# Patient Record
Sex: FEMALE | Race: WHITE | NOT HISPANIC OR LATINO | Employment: OTHER | URBAN - METROPOLITAN AREA
[De-identification: names, ages, dates, MRNs, and addresses within clinical notes are randomized per-mention and may not be internally consistent; named-entity substitution may affect disease eponyms.]

---

## 2017-01-11 ENCOUNTER — HOSPITAL ENCOUNTER (OUTPATIENT)
Dept: PAIN MEDICINE | Facility: HOSPITAL | Age: 57
Discharge: HOME | End: 2017-01-11
Attending: ANESTHESIOLOGY | Admitting: ANESTHESIOLOGY

## 2017-01-11 LAB
AMPHETAMINES UR QL SCN: NEGATIVE
BZE UR QL SCN: NEGATIVE
CREAT 24H UR-MCNC: ABNORMAL MG/DL
METHADONE UR QL SCN: NEGATIVE
OPIATE CONFIRMATION URINE: ABNORMAL
THC SERPLBLD CFM-MCNC: NEGATIVE NG/ML

## 2017-02-01 ENCOUNTER — OFFICE (AMBULATORY)
Dept: URBAN - METROPOLITAN AREA CLINIC 64 | Facility: CLINIC | Age: 57
End: 2017-02-01

## 2017-02-01 VITALS
DIASTOLIC BLOOD PRESSURE: 93 MMHG | HEART RATE: 79 BPM | HEIGHT: 66 IN | WEIGHT: 160 LBS | SYSTOLIC BLOOD PRESSURE: 110 MMHG

## 2017-02-01 DIAGNOSIS — R94.5 ABNORMAL RESULTS OF LIVER FUNCTION STUDIES: ICD-10-CM

## 2017-02-01 DIAGNOSIS — R93.2 ABNORMAL FINDINGS ON DIAGNOSTIC IMAGING OF LIVER AND BILIARY: ICD-10-CM

## 2017-02-01 LAB
ACTIN (SMOOTH MUSCLE) ANTIBODY: 11 UNITS (ref 0–19)
ALK PHOS ISOENZYME: BONE FRACTION: 29 % (ref 14–68)
ALK PHOS ISOENZYME: INTESTINAL FRAC.: 5 % (ref 0–18)
ALK PHOS ISOENZYME: LIVER FRACTION: 67 % (ref 18–85)
ALPHA-1-ANTITRYPSIN PHENOTYP: ALPHA-1-ANTITRYPSIN, SERUM: 156 MG/DL (ref 90–200)
ALPHA-1-ANTITRYPSIN PHENOTYP: PHENOTYPE (PI): (no result)
ANTINUCLEAR ANTIBODIES, IFA: NEGATIVE
CERULOPLASMIN: 44 MG/DL — HIGH (ref 19–39)
FERRITIN, SERUM: 79 NG/ML (ref 15–150)
GGT: 68 IU/L — HIGH (ref 0–60)
IMMUNOGLOBULINS A/G/M, QN, SER: IMMUNOGLOBULIN A, QN, SERUM: 235 MG/DL (ref 87–352)
IMMUNOGLOBULINS A/G/M, QN, SER: IMMUNOGLOBULIN G, QN, SERUM: 869 MG/DL (ref 700–1600)
IMMUNOGLOBULINS A/G/M, QN, SER: IMMUNOGLOBULIN M, QN, SERUM: 246 MG/DL — HIGH (ref 26–217)
IRON AND TIBC: IRON BIND.CAP.(TIBC): 248 UG/DL — LOW (ref 250–450)
IRON AND TIBC: IRON SATURATION: 17 % (ref 15–55)
IRON AND TIBC: IRON, SERUM: 41 UG/DL (ref 27–159)
IRON AND TIBC: UIBC: 207 UG/DL (ref 131–425)
Lab: 174 IU/L — HIGH (ref 39–117)
MITOCHONDRIAL (M2) ANTIBODY: <20 UNITS

## 2017-02-01 PROCEDURE — 99213 OFFICE O/P EST LOW 20 MIN: CPT | Performed by: INTERNAL MEDICINE

## 2017-03-08 ENCOUNTER — HOSPITAL ENCOUNTER (OUTPATIENT)
Dept: PAIN MEDICINE | Facility: HOSPITAL | Age: 57
Discharge: HOME | End: 2017-03-08
Attending: ANESTHESIOLOGY | Admitting: ANESTHESIOLOGY

## 2017-03-08 LAB
AMPHETAMINES UR QL SCN: NEGATIVE
BZE UR QL SCN: NEGATIVE
CREAT 24H UR-MCNC: NORMAL MG/DL
METHADONE UR QL SCN: NEGATIVE
OPIATE CONFIRMATION URINE: NORMAL
THC SERPLBLD CFM-MCNC: NEGATIVE NG/ML

## 2017-03-13 ENCOUNTER — OFFICE (AMBULATORY)
Dept: URBAN - METROPOLITAN AREA CLINIC 64 | Facility: CLINIC | Age: 57
End: 2017-03-13

## 2017-03-13 VITALS
HEIGHT: 66 IN | HEART RATE: 86 BPM | DIASTOLIC BLOOD PRESSURE: 81 MMHG | SYSTOLIC BLOOD PRESSURE: 125 MMHG | WEIGHT: 161 LBS

## 2017-03-13 DIAGNOSIS — R94.5 ABNORMAL RESULTS OF LIVER FUNCTION STUDIES: ICD-10-CM

## 2017-03-13 DIAGNOSIS — R11.2 NAUSEA WITH VOMITING, UNSPECIFIED: ICD-10-CM

## 2017-03-13 DIAGNOSIS — R10.11 RIGHT UPPER QUADRANT PAIN: ICD-10-CM

## 2017-03-13 DIAGNOSIS — R93.2 ABNORMAL FINDINGS ON DIAGNOSTIC IMAGING OF LIVER AND BILIARY: ICD-10-CM

## 2017-03-13 PROCEDURE — 99214 OFFICE O/P EST MOD 30 MIN: CPT | Performed by: INTERNAL MEDICINE

## 2017-03-23 ENCOUNTER — HOSPITAL ENCOUNTER (OUTPATIENT)
Dept: OTHER | Facility: HOSPITAL | Age: 57
Setting detail: SPECIMEN
Discharge: HOME | End: 2017-03-23
Attending: FAMILY MEDICINE | Admitting: FAMILY MEDICINE

## 2017-05-08 ENCOUNTER — HOSPITAL ENCOUNTER (OUTPATIENT)
Dept: PAIN MEDICINE | Facility: HOSPITAL | Age: 57
Discharge: HOME | End: 2017-05-08
Attending: ANESTHESIOLOGY | Admitting: ANESTHESIOLOGY

## 2017-06-06 ENCOUNTER — HOSPITAL ENCOUNTER (OUTPATIENT)
Dept: PAIN MEDICINE | Facility: HOSPITAL | Age: 57
Discharge: HOME | End: 2017-06-06
Attending: ANESTHESIOLOGY | Admitting: ANESTHESIOLOGY

## 2017-07-05 ENCOUNTER — HOSPITAL ENCOUNTER (OUTPATIENT)
Dept: PAIN MEDICINE | Facility: HOSPITAL | Age: 57
Discharge: HOME | End: 2017-07-05
Attending: ANESTHESIOLOGY | Admitting: ANESTHESIOLOGY

## 2017-09-05 ENCOUNTER — HOSPITAL ENCOUNTER (OUTPATIENT)
Dept: PAIN MEDICINE | Facility: HOSPITAL | Age: 57
Discharge: HOME | End: 2017-09-05
Attending: ANESTHESIOLOGY | Admitting: ANESTHESIOLOGY

## 2017-11-13 ENCOUNTER — HOSPITAL ENCOUNTER (OUTPATIENT)
Dept: PAIN MEDICINE | Facility: HOSPITAL | Age: 57
Discharge: HOME | End: 2017-11-13
Attending: ANESTHESIOLOGY | Admitting: ANESTHESIOLOGY

## 2017-11-13 LAB
AMPHETAMINES UR QL SCN: ABNORMAL
BZE UR QL SCN: NEGATIVE
CREAT 24H UR-MCNC: ABNORMAL MG/DL
METHADONE UR QL SCN: NEGATIVE
OPIATE CONFIRMATION URINE: ABNORMAL
THC SERPLBLD CFM-MCNC: ABNORMAL NG/ML

## 2018-05-21 ENCOUNTER — HOSPITAL ENCOUNTER (OUTPATIENT)
Dept: OTHER | Facility: HOSPITAL | Age: 58
Discharge: HOME | End: 2018-05-21
Attending: SPECIALIST | Admitting: SPECIALIST

## 2018-05-21 LAB
CRP SERPL-MCNC: 2.29 MG/DL (ref 0–0.7)
ERYTHROCYTE [SEDIMENTATION RATE] IN BLOOD BY WESTERGREN METHOD: 48 MM/HR (ref 0–30)

## 2018-05-23 LAB
ANA SER QL IA: NORMAL
CHROMATIN AB SERPL-ACNC: <20 [IU]/ML (ref 0–20)

## 2018-10-04 ENCOUNTER — HOSPITAL ENCOUNTER (OUTPATIENT)
Dept: MRI IMAGING | Facility: HOSPITAL | Age: 58
Discharge: HOME | End: 2018-10-04
Attending: SPECIALIST | Admitting: SPECIALIST

## 2020-12-01 ENCOUNTER — OFFICE VISIT (OUTPATIENT)
Dept: FAMILY MEDICINE CLINIC | Facility: CLINIC | Age: 60
End: 2020-12-01

## 2020-12-01 VITALS
RESPIRATION RATE: 18 BRPM | DIASTOLIC BLOOD PRESSURE: 74 MMHG | HEART RATE: 73 BPM | WEIGHT: 142 LBS | TEMPERATURE: 96.9 F | OXYGEN SATURATION: 98 % | SYSTOLIC BLOOD PRESSURE: 182 MMHG | HEIGHT: 65 IN | BODY MASS INDEX: 23.66 KG/M2

## 2020-12-01 DIAGNOSIS — Z12.31 ENCOUNTER FOR SCREENING MAMMOGRAM FOR MALIGNANT NEOPLASM OF BREAST: ICD-10-CM

## 2020-12-01 DIAGNOSIS — Z78.0 POSTMENOPAUSAL: ICD-10-CM

## 2020-12-01 DIAGNOSIS — F32.1 CURRENT MODERATE EPISODE OF MAJOR DEPRESSIVE DISORDER, UNSPECIFIED WHETHER RECURRENT (HCC): ICD-10-CM

## 2020-12-01 DIAGNOSIS — G62.89 OTHER POLYNEUROPATHY: ICD-10-CM

## 2020-12-01 DIAGNOSIS — F51.04 CHRONIC INSOMNIA: ICD-10-CM

## 2020-12-01 DIAGNOSIS — R03.0 ELEVATED BLOOD PRESSURE READING WITHOUT DIAGNOSIS OF HYPERTENSION: Primary | ICD-10-CM

## 2020-12-01 DIAGNOSIS — E78.2 MIXED HYPERLIPIDEMIA: ICD-10-CM

## 2020-12-01 DIAGNOSIS — E03.9 ACQUIRED HYPOTHYROIDISM: ICD-10-CM

## 2020-12-01 DIAGNOSIS — N89.8 VAGINAL PRURITUS: ICD-10-CM

## 2020-12-01 DIAGNOSIS — Z23 NEED FOR INFLUENZA VACCINATION: ICD-10-CM

## 2020-12-01 PROBLEM — G89.29 CHRONIC PAIN: Status: ACTIVE | Noted: 2017-11-13

## 2020-12-01 PROBLEM — M51.34 DEGENERATION OF INTERVERTEBRAL DISC OF THORACIC REGION: Status: ACTIVE | Noted: 2017-03-08

## 2020-12-01 PROBLEM — E78.5 HYPERLIPIDEMIA: Status: ACTIVE | Noted: 2020-12-01

## 2020-12-01 PROBLEM — M19.90 ARTHRITIS: Status: ACTIVE | Noted: 2020-12-01

## 2020-12-01 PROBLEM — I10 HYPERTENSION: Status: ACTIVE | Noted: 2020-12-01

## 2020-12-01 PROBLEM — F32.A DEPRESSION: Status: ACTIVE | Noted: 2020-12-01

## 2020-12-01 PROBLEM — Z79.899 OTHER LONG TERM (CURRENT) DRUG THERAPY: Status: ACTIVE | Noted: 2017-01-11

## 2020-12-01 PROCEDURE — 90686 IIV4 VACC NO PRSV 0.5 ML IM: CPT | Performed by: FAMILY MEDICINE

## 2020-12-01 PROCEDURE — 99204 OFFICE O/P NEW MOD 45 MIN: CPT | Performed by: FAMILY MEDICINE

## 2020-12-01 PROCEDURE — G0008 ADMIN INFLUENZA VIRUS VAC: HCPCS | Performed by: FAMILY MEDICINE

## 2020-12-01 RX ORDER — ATORVASTATIN CALCIUM 20 MG/1
TABLET, FILM COATED ORAL
COMMUNITY
End: 2020-12-01

## 2020-12-01 RX ORDER — SOLIFENACIN SUCCINATE 5 MG/1
TABLET, FILM COATED ORAL
COMMUNITY
Start: 2020-10-17 | End: 2020-12-01

## 2020-12-01 RX ORDER — ONDANSETRON 4 MG/1
TABLET, FILM COATED ORAL
COMMUNITY
Start: 2020-10-12 | End: 2020-12-01

## 2020-12-01 RX ORDER — OXYCODONE HYDROCHLORIDE 10 MG/1
TABLET ORAL
COMMUNITY
Start: 2020-08-28 | End: 2020-12-01

## 2020-12-01 RX ORDER — LEVOTHYROXINE SODIUM 0.12 MG/1
125 TABLET ORAL DAILY
Qty: 90 TABLET | Refills: 0 | Status: SHIPPED | OUTPATIENT
Start: 2020-12-01

## 2020-12-01 RX ORDER — VENLAFAXINE HYDROCHLORIDE 37.5 MG/1
TABLET, EXTENDED RELEASE ORAL
COMMUNITY
End: 2020-12-01

## 2020-12-01 RX ORDER — LISINOPRIL 20 MG/1
TABLET ORAL
COMMUNITY
Start: 2013-10-02 | End: 2020-12-01

## 2020-12-01 RX ORDER — FAMOTIDINE 40 MG/1
40 TABLET, FILM COATED ORAL DAILY
Qty: 90 TABLET | Refills: 0 | Status: SHIPPED | OUTPATIENT
Start: 2020-12-01 | End: 2021-03-02

## 2020-12-01 RX ORDER — LEVOTHYROXINE SODIUM 0.1 MG/1
TABLET ORAL
COMMUNITY
End: 2020-12-01

## 2020-12-01 RX ORDER — PREGABALIN 75 MG/1
75 CAPSULE ORAL 3 TIMES DAILY
Qty: 90 CAPSULE | Refills: 0 | Status: SHIPPED | OUTPATIENT
Start: 2020-12-01 | End: 2021-01-26

## 2020-12-01 RX ORDER — CYCLOBENZAPRINE HCL 10 MG
TABLET ORAL
COMMUNITY
Start: 2017-01-11 | End: 2020-12-01

## 2020-12-01 RX ORDER — LEVOTHYROXINE SODIUM 0.12 MG/1
TABLET ORAL
COMMUNITY
Start: 2020-10-17 | End: 2020-12-01 | Stop reason: SDUPTHER

## 2020-12-01 RX ORDER — FLUOXETINE HYDROCHLORIDE 20 MG/1
20 CAPSULE ORAL DAILY
Qty: 30 CAPSULE | Refills: 1 | Status: SHIPPED | OUTPATIENT
Start: 2020-12-01 | End: 2021-03-02

## 2020-12-01 RX ORDER — BUSPIRONE HYDROCHLORIDE 10 MG/1
TABLET ORAL
COMMUNITY
Start: 2020-10-17 | End: 2020-12-01

## 2020-12-01 RX ORDER — ATORVASTATIN CALCIUM 80 MG/1
TABLET, FILM COATED ORAL
COMMUNITY
Start: 2020-10-17

## 2020-12-01 RX ORDER — ZOLPIDEM TARTRATE 10 MG/1
TABLET ORAL
COMMUNITY
Start: 2020-09-29 | End: 2020-12-01 | Stop reason: SDUPTHER

## 2020-12-01 RX ORDER — FLUCONAZOLE 150 MG/1
TABLET ORAL
Qty: 2 TABLET | Refills: 0 | Status: SHIPPED | OUTPATIENT
Start: 2020-12-01

## 2020-12-01 RX ORDER — FLUOXETINE HYDROCHLORIDE 20 MG/1
CAPSULE ORAL
COMMUNITY
Start: 2020-09-22 | End: 2020-12-01 | Stop reason: SDUPTHER

## 2020-12-01 RX ORDER — GABAPENTIN 800 MG/1
TABLET ORAL
COMMUNITY
Start: 2017-03-08

## 2020-12-01 RX ORDER — OXYCODONE HYDROCHLORIDE 10 MG/1
TABLET ORAL
COMMUNITY
Start: 2016-12-01 | End: 2020-12-01

## 2020-12-01 RX ORDER — RANITIDINE 150 MG/1
TABLET ORAL
COMMUNITY
End: 2020-12-01

## 2020-12-01 RX ORDER — BACLOFEN 10 MG/1
TABLET ORAL
COMMUNITY
Start: 2020-10-12 | End: 2020-12-01

## 2020-12-01 RX ORDER — LORATADINE 10 MG/1
TABLET ORAL
COMMUNITY
End: 2020-12-01

## 2020-12-01 RX ORDER — ZOLPIDEM TARTRATE 10 MG/1
10 TABLET ORAL NIGHTLY PRN
Qty: 30 TABLET | Refills: 0 | Status: SHIPPED | OUTPATIENT
Start: 2020-12-01 | End: 2021-01-26

## 2020-12-01 RX ORDER — MELOXICAM 7.5 MG/1
TABLET ORAL
COMMUNITY
Start: 2020-09-28 | End: 2020-12-01

## 2020-12-01 NOTE — PROGRESS NOTES
Gunner Garcia is a 60 y.o. female.     Chief Complaint   Patient presents with   • Establish Care   • Depression     used to take prozac   • Vaginitis     itchy - no UA symptoms   • Insomnia     used to take ambien   • Peripheral Neuropathy     arthritis/ mult back surgeries for scoliosis - on pain pump         Current Outpatient Medications:   •  atorvastatin (LIPITOR) 80 MG tablet, , Disp: , Rfl:   •  levothyroxine (SYNTHROID, LEVOTHROID) 125 MCG tablet, Take 1 tablet by mouth Daily., Disp: 90 tablet, Rfl: 0  •  zolpidem (AMBIEN) 10 MG tablet, Take 1 tablet by mouth At Night As Needed for Sleep., Disp: 30 tablet, Rfl: 0  •  famotidine (PEPCID) 40 MG tablet, Take 1 tablet by mouth Daily., Disp: 90 tablet, Rfl: 0  •  fluconazole (Diflucan) 150 MG tablet, 1 po today and may repeat x1 in 4 days if still having symptoms, Disp: 2 tablet, Rfl: 0  •  FLUoxetine (PROzac) 20 MG capsule, Take 1 capsule by mouth Daily., Disp: 30 capsule, Rfl: 1  •  gabapentin (NEURONTIN) 800 MG tablet, GABAPENTIN 800 MG TABS, Disp: , Rfl:   •  pregabalin (Lyrica) 75 MG capsule, Take 1 capsule by mouth 3 (Three) Times a Day., Disp: 90 capsule, Rfl: 0    Past Medical History:   Diagnosis Date   • Arthritis    • Colon perforation 2012   • Depression    • GERD    • Hypothyroidism    • MAMMO    • Neuropathy     Hands/ Feet   • PAP    • Scoliosis 2010   • Stomach ulcer 2012       Past Surgical History:   Procedure Laterality Date   • BACK SURGERY  [id number]    6 surgeries   • COLON SURGERY      Colon Resection   • COLONOSCOPY         Family History   Problem Relation Age of Onset   • Arthritis Mother    • Cancer Mother 58        Colon Cancer   • Osteoporosis Mother    • Hypertension Mother    • Stroke Mother    • Hyperlipidemia Mother    • Thyroid disease Mother    • Liver disease Father    • Alcohol abuse Father    • Thyroid disease Sister    • Cancer Maternal Aunt         lung       Social History     Socioeconomic History   •  Marital status:      Spouse name: Not on file   • Number of children: Not on file   • Years of education: Not on file   • Highest education level: Not on file   Tobacco Use   • Smoking status: Former Smoker     Packs/day: 0.50     Years: 25.00     Pack years: 12.50     Types: Cigarettes     Quit date: 2010     Years since quitting: 10.9   • Smokeless tobacco: Never Used   Substance and Sexual Activity   • Alcohol use: Never     Frequency: Never   • Drug use: Yes     Comment: Marijuana ---- occasionally   • Sexual activity: Not Currently       60 y/o C female here to est care w/ hx/o Depression/ Insomnia/ Hypothyroid/ Peripheral Neuropathy and now w/ some vaginal pruitis    Home BP = 120'/80's w/ her wrist cuff but it hasn't been checked for accuracy----[facility] used to see her at her home and their BP's were about the same as hers    Pt states she used to take Prozac and ambien in past for above and felt she did well w/ them but they were changed and she never did as good w/ the other meds    Pt states she has some vaginal pruitis but no real d/c or noteable rash       The following portions of the patient's history were reviewed and updated as appropriate: allergies, current medications, past family history, past medical history, past social history, past surgical history and problem list.    Review of Systems   Constitutional: Positive for appetite change and fatigue. Negative for activity change, unexpected weight gain and unexpected weight loss.   Respiratory: Negative for cough, shortness of breath and wheezing.    Cardiovascular: Negative for chest pain, palpitations and leg swelling.   Gastrointestinal: Positive for GERD. Negative for blood in stool, nausea and vomiting.   Genitourinary: Negative for decreased urine volume, difficulty urinating, dysuria, hematuria, urgency, urinary incontinence, vaginal discharge and vaginal pain.   Skin: Negative for rash.   Neurological: Positive for numbness. Negative  for memory problem.   Psychiatric/Behavioral: Positive for sleep disturbance and depressed mood. Negative for agitation and suicidal ideas.       Vitals:    12/01/20 1023   BP: (!) 182/74   Pulse: 73   Resp: 18   Temp: 96.9 °F (36.1 °C)   SpO2: 98%       Objective   Physical Exam  Vitals signs and nursing note reviewed.   Constitutional:       General: She is not in acute distress.     Appearance: Normal appearance. She is well-developed and normal weight. She is not ill-appearing or toxic-appearing.   HENT:      Head: Normocephalic and atraumatic.   Neck:      Musculoskeletal: Normal range of motion and neck supple.   Cardiovascular:      Rate and Rhythm: Normal rate and regular rhythm.      Heart sounds: Normal heart sounds. No murmur.   Pulmonary:      Effort: Pulmonary effort is normal. No respiratory distress.      Breath sounds: Normal breath sounds. No stridor. No wheezing, rhonchi or rales.   Musculoskeletal:      Right lower leg: No edema.      Left lower leg: Edema present.   Skin:     General: Skin is warm and dry.      Findings: No rash.   Neurological:      Mental Status: She is alert and oriented to person, place, and time.      Cranial Nerves: No cranial nerve deficit.   Psychiatric:         Attention and Perception: Attention and perception normal.         Mood and Affect: Mood and affect normal.         Speech: Speech normal.         Behavior: Behavior normal. Behavior is cooperative.         Thought Content: Thought content normal.         Cognition and Memory: Cognition and memory normal.         Judgment: Judgment normal.           Assessment/Plan   Diagnoses and all orders for this visit:    1. Elevated blood pressure reading without diagnosis of hypertension (Primary)    2. Current moderate episode of major depressive disorder, unspecified whether recurrent (CMS/HCC)    3. Chronic insomnia  -     zolpidem (AMBIEN) 10 MG tablet; Take 1 tablet by mouth At Night As Needed for Sleep.  Dispense: 30  tablet; Refill: 0    4. Other polyneuropathy  -     pregabalin (Lyrica) 75 MG capsule; Take 1 capsule by mouth 3 (Three) Times a Day.  Dispense: 90 capsule; Refill: 0    5. Vaginal pruritus    6. Acquired hypothyroidism    7. Mixed hyperlipidemia  -     Lipid Panel; Future  -     Comprehensive Metabolic Panel; Future    8. Postmenopausal  -     DEXA Bone Density Axial; Future    9. Encounter for screening mammogram for malignant neoplasm of breast  -     Cancel: Mammo Screening Digital Tomosynthesis Bilateral With CAD; Future  -     Mammo Screening Digital Tomosynthesis Bilateral With CAD; Future    10. Need for influenza vaccination  -     Fluarix/Fluzone/Afluria Quad>6 Months    Other orders  -     FLUoxetine (PROzac) 20 MG capsule; Take 1 capsule by mouth Daily.  Dispense: 30 capsule; Refill: 1  -     levothyroxine (SYNTHROID, LEVOTHROID) 125 MCG tablet; Take 1 tablet by mouth Daily.  Dispense: 90 tablet; Refill: 0  -     fluconazole (Diflucan) 150 MG tablet; 1 po today and may repeat x1 in 4 days if still having symptoms  Dispense: 2 tablet; Refill: 0  -     famotidine (PEPCID) 40 MG tablet; Take 1 tablet by mouth Daily.  Dispense: 90 tablet; Refill: 0      Mammo/ Dexa  Flu shot  Restart Prozac and Ambien/ Synthroid  Trial Diflucan

## 2020-12-10 PROBLEM — M96.0 PSEUDARTHROSIS FOLLOWING SPINAL FUSION: Status: ACTIVE | Noted: 2017-01-06

## 2020-12-10 PROBLEM — T84.216A: Status: ACTIVE | Noted: 2020-08-10

## 2020-12-10 PROBLEM — F41.9 ANXIETY AND DEPRESSION: Status: ACTIVE | Noted: 2020-12-10

## 2020-12-10 PROBLEM — T84.226A: Status: ACTIVE | Noted: 2020-06-24

## 2020-12-10 PROBLEM — G62.9 NEUROPATHY: Status: ACTIVE | Noted: 2020-12-10

## 2020-12-10 PROBLEM — R33.8 ACUTE URINARY RETENTION: Status: ACTIVE | Noted: 2020-08-11

## 2020-12-10 PROBLEM — IMO0002 DDD (DEGENERATIVE DISC DISEASE): Status: ACTIVE | Noted: 2020-12-10

## 2020-12-10 PROBLEM — F32.A ANXIETY AND DEPRESSION: Status: ACTIVE | Noted: 2020-12-10

## 2020-12-10 PROBLEM — M81.0 OSTEOPOROSIS: Status: ACTIVE | Noted: 2020-12-10

## 2020-12-10 PROBLEM — IMO0002 PSEUDOARTHROSIS OF SPINE: Status: ACTIVE | Noted: 2017-02-09

## 2020-12-10 PROBLEM — Z72.0 TOBACCO USE: Status: ACTIVE | Noted: 2020-12-10

## 2020-12-11 ENCOUNTER — CLINICAL SUPPORT (OUTPATIENT)
Dept: FAMILY MEDICINE CLINIC | Facility: CLINIC | Age: 60
End: 2020-12-11

## 2020-12-11 DIAGNOSIS — E78.2 MIXED HYPERLIPIDEMIA: ICD-10-CM

## 2020-12-11 PROCEDURE — 80053 COMPREHEN METABOLIC PANEL: CPT | Performed by: FAMILY MEDICINE

## 2020-12-11 PROCEDURE — 80061 LIPID PANEL: CPT | Performed by: FAMILY MEDICINE

## 2020-12-11 PROCEDURE — 36415 COLL VENOUS BLD VENIPUNCTURE: CPT | Performed by: FAMILY MEDICINE

## 2020-12-12 LAB
ALBUMIN SERPL-MCNC: 3.7 G/DL (ref 3.5–5.2)
ALBUMIN/GLOB SERPL: 1.4 G/DL
ALP SERPL-CCNC: 133 U/L (ref 39–117)
ALT SERPL W P-5'-P-CCNC: 17 U/L (ref 1–33)
ANION GAP SERPL CALCULATED.3IONS-SCNC: 10.6 MMOL/L (ref 5–15)
AST SERPL-CCNC: 19 U/L (ref 1–32)
BILIRUB SERPL-MCNC: 0.4 MG/DL (ref 0–1.2)
BUN SERPL-MCNC: 5 MG/DL (ref 8–23)
BUN/CREAT SERPL: 7.5 (ref 7–25)
CALCIUM SPEC-SCNC: 9.2 MG/DL (ref 8.6–10.5)
CHLORIDE SERPL-SCNC: 103 MMOL/L (ref 98–107)
CHOLEST SERPL-MCNC: 130 MG/DL (ref 0–200)
CO2 SERPL-SCNC: 28.4 MMOL/L (ref 22–29)
CREAT SERPL-MCNC: 0.67 MG/DL (ref 0.57–1)
GFR SERPL CREATININE-BSD FRML MDRD: 89 ML/MIN/1.73
GLOBULIN UR ELPH-MCNC: 2.7 GM/DL
GLUCOSE SERPL-MCNC: 94 MG/DL (ref 65–99)
HDLC SERPL-MCNC: 39 MG/DL (ref 40–60)
LDLC SERPL CALC-MCNC: 65 MG/DL (ref 0–100)
LDLC/HDLC SERPL: 1.56 {RATIO}
POTASSIUM SERPL-SCNC: 4 MMOL/L (ref 3.5–5.2)
PROT SERPL-MCNC: 6.4 G/DL (ref 6–8.5)
SODIUM SERPL-SCNC: 142 MMOL/L (ref 136–145)
TRIGL SERPL-MCNC: 150 MG/DL (ref 0–150)
VLDLC SERPL-MCNC: 26 MG/DL (ref 5–40)

## 2020-12-14 ENCOUNTER — TELEPHONE (OUTPATIENT)
Dept: FAMILY MEDICINE CLINIC | Facility: CLINIC | Age: 60
End: 2020-12-14

## 2021-01-26 DIAGNOSIS — G62.89 OTHER POLYNEUROPATHY: ICD-10-CM

## 2021-01-26 DIAGNOSIS — F51.04 CHRONIC INSOMNIA: ICD-10-CM

## 2021-01-26 RX ORDER — PREGABALIN 75 MG/1
75 CAPSULE ORAL 3 TIMES DAILY
Qty: 90 CAPSULE | Refills: 1 | Status: SHIPPED | OUTPATIENT
Start: 2021-01-26

## 2021-01-26 RX ORDER — ZOLPIDEM TARTRATE 10 MG/1
10 TABLET ORAL NIGHTLY PRN
Qty: 30 TABLET | Refills: 1 | Status: SHIPPED | OUTPATIENT
Start: 2021-01-26

## 2021-03-01 RX ORDER — FLUOXETINE HYDROCHLORIDE 20 MG/1
20 CAPSULE ORAL DAILY
Qty: 30 CAPSULE | Refills: 0 | OUTPATIENT
Start: 2021-03-01

## 2021-03-01 RX ORDER — FAMOTIDINE 40 MG/1
40 TABLET, FILM COATED ORAL DAILY
Qty: 90 TABLET | Refills: 0 | OUTPATIENT
Start: 2021-03-01

## 2021-03-01 NOTE — TELEPHONE ENCOUNTER
Last visit: 12/1/20  Next visit: none  Last labs: 12/11/20    Rx requested:Fluoxetine,Famotidine   Pharmacy: [facility] in [place]

## 2021-03-02 RX ORDER — FAMOTIDINE 40 MG/1
40 TABLET, FILM COATED ORAL DAILY
Qty: 90 TABLET | Refills: 0 | Status: SHIPPED | OUTPATIENT
Start: 2021-03-02

## 2021-03-02 RX ORDER — FLUOXETINE HYDROCHLORIDE 20 MG/1
20 CAPSULE ORAL DAILY
Qty: 90 CAPSULE | Refills: 0 | Status: SHIPPED | OUTPATIENT
Start: 2021-03-02

## 2021-03-02 NOTE — TELEPHONE ENCOUNTER
Last visit: 12/1/20  Next visit: none  Last labs: 12/11/20    Rx requested:Famotidine,Prozac  Pharmacy: [facility] in [place]

## 2021-03-03 ENCOUNTER — TELEPHONE (OUTPATIENT)
Dept: FAMILY MEDICINE CLINIC | Facility: CLINIC | Age: 61
End: 2021-03-03

## 2021-03-03 NOTE — TELEPHONE ENCOUNTER
Pt cancelled her appt today for bp check.   Says she hurt her back and can't get out of bed.  Wants to know if you can give her her refills.  Rescheduled for next Wed

## 2023-01-31 ENCOUNTER — TELEPHONE (OUTPATIENT)
Dept: FAMILY MEDICINE CLINIC | Facility: CLINIC | Age: 63
End: 2023-01-31
Payer: MEDICARE

## 2023-01-31 NOTE — TELEPHONE ENCOUNTER
Left voice message stating patient is due for an annual wellness exam/physical. Instructed to call back to office to schedule.    Ok for Hub to read and schedule.

## 2023-02-24 ENCOUNTER — APPOINTMENT (OUTPATIENT)
Dept: CT IMAGING | Facility: HOSPITAL | Age: 63
End: 2023-02-24
Payer: MEDICARE

## 2023-02-24 ENCOUNTER — HOSPITAL ENCOUNTER (EMERGENCY)
Facility: HOSPITAL | Age: 63
Discharge: HOME | End: 2023-02-24
Attending: EMERGENCY MEDICINE | Admitting: EMERGENCY MEDICINE
Payer: MEDICARE

## 2023-02-24 ENCOUNTER — APPOINTMENT (OUTPATIENT)
Dept: GENERAL RADIOLOGY | Facility: HOSPITAL | Age: 63
End: 2023-02-24
Payer: MEDICARE

## 2023-02-24 VITALS
OXYGEN SATURATION: 96 % | DIASTOLIC BLOOD PRESSURE: 82 MMHG | RESPIRATION RATE: 20 BRPM | HEART RATE: 72 BPM | SYSTOLIC BLOOD PRESSURE: 162 MMHG | WEIGHT: 140 LBS | HEIGHT: 65 IN | TEMPERATURE: 98 F | BODY MASS INDEX: 23.32 KG/M2

## 2023-02-24 DIAGNOSIS — S42.022A DISPLACED FRACTURE OF SHAFT OF LEFT CLAVICLE, INITIAL ENCOUNTER FOR CLOSED FRACTURE: Primary | ICD-10-CM
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
## 2023-02-24 LAB
ALBUMIN SERPL-MCNC: 4.3 G/DL (ref 3.5–5.2)
ALBUMIN/GLOB SERPL: 1.5 G/DL
ALP SERPL-CCNC: 120 U/L (ref 39–117)
ALT SERPL W P-5'-P-CCNC: 40 U/L (ref 1–33)
ANION GAP SERPL CALCULATED.3IONS-SCNC: 12 MMOL/L (ref 5–15)
AST SERPL-CCNC: 33 U/L (ref 1–32)
BASOPHILS # BLD AUTO: 0.1 10*3/MM3 (ref 0–0.2)
BASOPHILS NFR BLD AUTO: 0.7 % (ref 0–1.5)
BILIRUB SERPL-MCNC: 0.3 MG/DL (ref 0–1.2)
BUN SERPL-MCNC: 20 MG/DL (ref 8–23)
BUN/CREAT SERPL: 21.3 (ref 7–25)
CALCIUM SPEC-SCNC: 9.5 MG/DL (ref 8.6–10.5)
CHLORIDE SERPL-SCNC: 103 MMOL/L (ref 98–107)
CO2 SERPL-SCNC: 22 MMOL/L (ref 22–29)
CREAT SERPL-MCNC: 0.94 MG/DL (ref 0.57–1)
DEPRECATED RDW RBC AUTO: 47.7 FL (ref 37–54)
EGFRCR SERPLBLD CKD-EPI 2021: 67.9 ML/MIN/1.73
EOSINOPHIL # BLD AUTO: 0.2 10*3/MM3 (ref 0–0.4)
EOSINOPHIL NFR BLD AUTO: 1 % (ref 0.3–6.2)
ERYTHROCYTE [DISTWIDTH] IN BLOOD BY AUTOMATED COUNT: 14.1 % (ref 12.3–15.4)
GLOBULIN UR ELPH-MCNC: 2.9 GM/DL
GLUCOSE SERPL-MCNC: 104 MG/DL (ref 65–99)
HCT VFR BLD AUTO: 39.7 % (ref 34–46.6)
HGB BLD-MCNC: 13.8 G/DL (ref 12–15.9)
LIPASE SERPL-CCNC: 40 U/L (ref 13–60)
LYMPHOCYTES # BLD AUTO: 1.4 10*3/MM3 (ref 0.7–3.1)
LYMPHOCYTES NFR BLD AUTO: 8.6 % (ref 19.6–45.3)
MCH RBC QN AUTO: 31.9 PG (ref 26.6–33)
MCHC RBC AUTO-ENTMCNC: 34.8 G/DL (ref 31.5–35.7)
MCV RBC AUTO: 91.6 FL (ref 79–97)
MONOCYTES # BLD AUTO: 0.8 10*3/MM3 (ref 0.1–0.9)
MONOCYTES NFR BLD AUTO: 4.9 % (ref 5–12)
NEUTROPHILS NFR BLD AUTO: 14.1 10*3/MM3 (ref 1.7–7)
NEUTROPHILS NFR BLD AUTO: 84.8 % (ref 42.7–76)
NRBC BLD AUTO-RTO: 0 /100 WBC (ref 0–0.2)
PLATELET # BLD AUTO: 267 10*3/MM3 (ref 140–450)
PMV BLD AUTO: 8.9 FL (ref 6–12)
POTASSIUM SERPL-SCNC: 4.1 MMOL/L (ref 3.5–5.2)
PROT SERPL-MCNC: 7.2 G/DL (ref 6–8.5)
RBC # BLD AUTO: 4.33 10*6/MM3 (ref 3.77–5.28)
SODIUM SERPL-SCNC: 137 MMOL/L (ref 136–145)
WBC NRBC COR # BLD: 16.6 10*3/MM3 (ref 3.4–10.8)

## 2023-02-24 PROCEDURE — 73030 X-RAY EXAM OF SHOULDER: CPT

## 2023-02-24 PROCEDURE — 72125 CT NECK SPINE W/O DYE: CPT

## 2023-02-24 PROCEDURE — 99284 EMERGENCY DEPT VISIT MOD MDM: CPT

## 2023-02-24 PROCEDURE — 85025 COMPLETE CBC W/AUTO DIFF WBC: CPT | Performed by: EMERGENCY MEDICINE

## 2023-02-24 PROCEDURE — 25010000002 DIAZEPAM PER 5 MG: Performed by: EMERGENCY MEDICINE

## 2023-02-24 PROCEDURE — 96374 THER/PROPH/DIAG INJ IV PUSH: CPT

## 2023-02-24 PROCEDURE — 80053 COMPREHEN METABOLIC PANEL: CPT | Performed by: EMERGENCY MEDICINE

## 2023-02-24 PROCEDURE — 25010000002 HYDROMORPHONE 1 MG/ML SOLUTION: Performed by: EMERGENCY MEDICINE

## 2023-02-24 PROCEDURE — 83690 ASSAY OF LIPASE: CPT | Performed by: EMERGENCY MEDICINE

## 2023-02-24 PROCEDURE — 96375 TX/PRO/DX INJ NEW DRUG ADDON: CPT

## 2023-02-24 PROCEDURE — 71250 CT THORAX DX C-: CPT

## 2023-02-24 PROCEDURE — 73060 X-RAY EXAM OF HUMERUS: CPT

## 2023-02-24 PROCEDURE — 70450 CT HEAD/BRAIN W/O DYE: CPT

## 2023-02-24 RX ORDER — SODIUM CHLORIDE 0.9 % (FLUSH) 0.9 %
10 SYRINGE (ML) INJECTION AS NEEDED
Status: DISCONTINUED | OUTPATIENT
Start: 2023-02-24 | End: 2023-02-24 | Stop reason: HOSPADM

## 2023-02-24 RX ORDER — DIAZEPAM 5 MG/ML
2.5 INJECTION, SOLUTION INTRAMUSCULAR; INTRAVENOUS ONCE
Status: COMPLETED | OUTPATIENT
Start: 2023-02-24 | End: 2023-02-24

## 2023-02-24 RX ORDER — HYDROCODONE BITARTRATE AND ACETAMINOPHEN 7.5; 325 MG/1; MG/1
1 TABLET ORAL ONCE
Status: COMPLETED | OUTPATIENT
Start: 2023-02-24 | End: 2023-02-24

## 2023-02-24 RX ADMIN — DIAZEPAM 2.5 MG: 10 INJECTION, SOLUTION INTRAMUSCULAR; INTRAVENOUS at 08:12

## 2023-02-24 RX ADMIN — HYDROMORPHONE HYDROCHLORIDE 0.5 MG: 1 INJECTION, SOLUTION INTRAMUSCULAR; INTRAVENOUS; SUBCUTANEOUS at 07:17

## 2023-02-24 RX ADMIN — HYDROCODONE BITARTRATE AND ACETAMINOPHEN 1 TABLET: 7.5; 325 TABLET ORAL at 06:02

## 2023-05-26 ENCOUNTER — HOSPITAL ENCOUNTER (OUTPATIENT)
Facility: HOSPITAL | Age: 63
Setting detail: OBSERVATION
Discharge: HOME | End: 2023-05-29
Attending: EMERGENCY MEDICINE | Admitting: INTERNAL MEDICINE
Payer: MEDICARE

## 2023-05-26 DIAGNOSIS — E83.42 HYPOMAGNESEMIA: ICD-10-CM

## 2023-05-26 DIAGNOSIS — R10.84 GENERALIZED ABDOMINAL PAIN: ICD-10-CM

## 2023-05-26 DIAGNOSIS — E87.6 HYPOKALEMIA: ICD-10-CM

## 2023-05-26 DIAGNOSIS — R19.7 DIARRHEA, UNSPECIFIED TYPE: ICD-10-CM

## 2023-05-26 DIAGNOSIS — K56.609 SMALL BOWEL OBSTRUCTION: ICD-10-CM

## 2023-05-26 DIAGNOSIS — R94.31 ABNORMAL EKG: ICD-10-CM

## 2023-05-26 DIAGNOSIS — M51.36 DEGENERATION OF INTERVERTEBRAL DISC OF LUMBAR REGION: ICD-10-CM

## 2023-05-26 DIAGNOSIS — R11.2 NAUSEA AND VOMITING, UNSPECIFIED VOMITING TYPE: Primary | ICD-10-CM

## 2023-05-26 LAB
ALBUMIN SERPL-MCNC: 4.7 G/DL (ref 3.5–5.2)
ALBUMIN/GLOB SERPL: 1.4 G/DL
ALP SERPL-CCNC: 130 U/L (ref 39–117)
ALT SERPL W P-5'-P-CCNC: 17 U/L (ref 1–33)
ANION GAP SERPL CALCULATED.3IONS-SCNC: 16 MMOL/L (ref 5–15)
AST SERPL-CCNC: 14 U/L (ref 1–32)
BILIRUB SERPL-MCNC: 0.8 MG/DL (ref 0–1.2)
BUN SERPL-MCNC: 11 MG/DL (ref 8–23)
BUN/CREAT SERPL: 11 (ref 7–25)
CALCIUM SPEC-SCNC: 9.9 MG/DL (ref 8.6–10.5)
CHLORIDE SERPL-SCNC: 100 MMOL/L (ref 98–107)
CO2 SERPL-SCNC: 17 MMOL/L (ref 22–29)
CREAT SERPL-MCNC: 1 MG/DL (ref 0.57–1)
DEPRECATED RDW RBC AUTO: 42.4 FL (ref 37–54)
EGFRCR SERPLBLD CKD-EPI 2021: 63 ML/MIN/1.73
EOSINOPHIL # BLD MANUAL: 0.08 10*3/MM3 (ref 0–0.4)
EOSINOPHIL NFR BLD MANUAL: 1 % (ref 0.3–6.2)
ERYTHROCYTE [DISTWIDTH] IN BLOOD BY AUTOMATED COUNT: 13.1 % (ref 12.3–15.4)
GLOBULIN UR ELPH-MCNC: 3.3 GM/DL
GLUCOSE SERPL-MCNC: 152 MG/DL (ref 65–99)
HCT VFR BLD AUTO: 44.2 % (ref 34–46.6)
HGB BLD-MCNC: 15.1 G/DL (ref 12–15.9)
LIPASE SERPL-CCNC: 17 U/L (ref 13–60)
LYMPHOCYTES # BLD MANUAL: 0.16 10*3/MM3 (ref 0.7–3.1)
LYMPHOCYTES NFR BLD MANUAL: 16 % (ref 5–12)
MAGNESIUM SERPL-MCNC: 1.4 MG/DL (ref 1.6–2.4)
MCH RBC QN AUTO: 31.3 PG (ref 26.6–33)
MCHC RBC AUTO-ENTMCNC: 34.2 G/DL (ref 31.5–35.7)
MCV RBC AUTO: 91.6 FL (ref 79–97)
MONOCYTES # BLD: 1.28 10*3/MM3 (ref 0.1–0.9)
NEUTROPHILS # BLD AUTO: 6.48 10*3/MM3 (ref 1.7–7)
NEUTROPHILS NFR BLD MANUAL: 52 % (ref 42.7–76)
NEUTS BAND NFR BLD MANUAL: 29 % (ref 0–5)
PLAT MORPH BLD: NORMAL
PLATELET # BLD AUTO: 245 10*3/MM3 (ref 140–450)
PMV BLD AUTO: 10.2 FL (ref 6–12)
POTASSIUM SERPL-SCNC: 2.7 MMOL/L (ref 3.5–5.2)
PROT SERPL-MCNC: 8 G/DL (ref 6–8.5)
RBC # BLD AUTO: 4.83 10*6/MM3 (ref 3.77–5.28)
RBC MORPH BLD: NORMAL
SCAN SLIDE: NORMAL
SODIUM SERPL-SCNC: 133 MMOL/L (ref 136–145)
TOXIC GRANULATION: ABNORMAL
VARIANT LYMPHS NFR BLD MANUAL: 2 % (ref 19.6–45.3)
WBC NRBC COR # BLD: 8 10*3/MM3 (ref 3.4–10.8)

## 2023-05-26 PROCEDURE — 85025 COMPLETE CBC W/AUTO DIFF WBC: CPT | Performed by: NURSE PRACTITIONER

## 2023-05-26 PROCEDURE — 99284 EMERGENCY DEPT VISIT MOD MDM: CPT

## 2023-05-26 PROCEDURE — 85007 BL SMEAR W/DIFF WBC COUNT: CPT | Performed by: NURSE PRACTITIONER

## 2023-05-26 PROCEDURE — 25010000002 ONDANSETRON PER 1 MG: Performed by: NURSE PRACTITIONER

## 2023-05-26 PROCEDURE — 83735 ASSAY OF MAGNESIUM: CPT | Performed by: NURSE PRACTITIONER

## 2023-05-26 PROCEDURE — 96375 TX/PRO/DX INJ NEW DRUG ADDON: CPT

## 2023-05-26 PROCEDURE — 25010000002 MAGNESIUM SULFATE 2 GM/50ML SOLUTION: Performed by: NURSE PRACTITIONER

## 2023-05-26 PROCEDURE — 93005 ELECTROCARDIOGRAM TRACING: CPT | Performed by: NURSE PRACTITIONER

## 2023-05-26 PROCEDURE — 83690 ASSAY OF LIPASE: CPT | Performed by: NURSE PRACTITIONER

## 2023-05-26 PROCEDURE — 25010000002 KETOROLAC TROMETHAMINE PER 15 MG: Performed by: NURSE PRACTITIONER

## 2023-05-26 PROCEDURE — 96365 THER/PROPH/DIAG IV INF INIT: CPT

## 2023-05-26 PROCEDURE — 87507 IADNA-DNA/RNA PROBE TQ 12-25: CPT | Performed by: NURSE PRACTITIONER

## 2023-05-26 PROCEDURE — 80053 COMPREHEN METABOLIC PANEL: CPT | Performed by: NURSE PRACTITIONER

## 2023-05-26 PROCEDURE — 96368 THER/DIAG CONCURRENT INF: CPT

## 2023-05-26 PROCEDURE — 0 POTASSIUM CHLORIDE 10 MEQ/100ML SOLUTION: Performed by: NURSE PRACTITIONER

## 2023-05-26 PROCEDURE — 87324 CLOSTRIDIUM AG IA: CPT | Performed by: INTERNAL MEDICINE

## 2023-05-26 PROCEDURE — 87449 NOS EACH ORGANISM AG IA: CPT | Performed by: INTERNAL MEDICINE

## 2023-05-26 RX ORDER — POTASSIUM CHLORIDE 7.45 MG/ML
10 INJECTION INTRAVENOUS
Status: DISPENSED | OUTPATIENT
Start: 2023-05-26 | End: 2023-05-27

## 2023-05-26 RX ORDER — KETOROLAC TROMETHAMINE 15 MG/ML
15 INJECTION, SOLUTION INTRAMUSCULAR; INTRAVENOUS ONCE
Status: COMPLETED | OUTPATIENT
Start: 2023-05-26 | End: 2023-05-26

## 2023-05-26 RX ORDER — MAGNESIUM SULFATE HEPTAHYDRATE 40 MG/ML
2 INJECTION, SOLUTION INTRAVENOUS ONCE
Status: COMPLETED | OUTPATIENT
Start: 2023-05-26 | End: 2023-05-27

## 2023-05-26 RX ORDER — ONDANSETRON 2 MG/ML
4 INJECTION INTRAMUSCULAR; INTRAVENOUS ONCE
Status: COMPLETED | OUTPATIENT
Start: 2023-05-26 | End: 2023-05-26

## 2023-05-26 RX ORDER — SODIUM CHLORIDE 0.9 % (FLUSH) 0.9 %
10 SYRINGE (ML) INJECTION AS NEEDED
Status: DISCONTINUED | OUTPATIENT
Start: 2023-05-26 | End: 2023-05-29 | Stop reason: HOSPADM

## 2023-05-26 RX ADMIN — KETOROLAC TROMETHAMINE 15 MG: 15 INJECTION, SOLUTION INTRAMUSCULAR; INTRAVENOUS at 23:26

## 2023-05-26 RX ADMIN — SODIUM CHLORIDE, POTASSIUM CHLORIDE, SODIUM LACTATE AND CALCIUM CHLORIDE 1000 ML: 600; 310; 30; 20 INJECTION, SOLUTION INTRAVENOUS at 22:16

## 2023-05-26 RX ADMIN — MAGNESIUM SULFATE HEPTAHYDRATE 2 G: 2 INJECTION, SOLUTION INTRAVENOUS at 23:27

## 2023-05-26 RX ADMIN — POTASSIUM CHLORIDE 10 MEQ: 7.46 INJECTION, SOLUTION INTRAVENOUS at 23:27

## 2023-05-26 RX ADMIN — ONDANSETRON 4 MG: 2 INJECTION INTRAMUSCULAR; INTRAVENOUS at 22:16

## 2023-05-26 NOTE — Clinical Note
Level of Care: Telemetry [5]   Admitting Physician: PEEWEE ROMO [[id number]]   Attending Physician: PEEWEE ROMO [[id number]]

## 2023-05-27 ENCOUNTER — APPOINTMENT (OUTPATIENT)
Dept: GENERAL RADIOLOGY | Facility: HOSPITAL | Age: 63
End: 2023-05-27
Payer: MEDICARE

## 2023-05-27 ENCOUNTER — APPOINTMENT (OUTPATIENT)
Dept: CT IMAGING | Facility: HOSPITAL | Age: 63
End: 2023-05-27
Payer: MEDICARE

## 2023-05-27 PROBLEM — R11.2 NAUSEA AND VOMITING, UNSPECIFIED VOMITING TYPE: Status: ACTIVE | Noted: 2023-05-27

## 2023-05-27 LAB
ADV 40+41 DNA STL QL NAA+NON-PROBE: NOT DETECTED
ASTRO TYP 1-8 RNA STL QL NAA+NON-PROBE: NOT DETECTED
C CAYETANENSIS DNA STL QL NAA+NON-PROBE: NOT DETECTED
C COLI+JEJ+UPSA DNA STL QL NAA+NON-PROBE: NOT DETECTED
C DIFF GDH + TOXINS A+B STL QL IA.RAPID: NEGATIVE
C DIFF GDH + TOXINS A+B STL QL IA.RAPID: NEGATIVE
CRYPTOSP DNA STL QL NAA+NON-PROBE: NOT DETECTED
E HISTOLYT DNA STL QL NAA+NON-PROBE: NOT DETECTED
EAEC PAA PLAS AGGR+AATA ST NAA+NON-PRB: NOT DETECTED
EC STX1+STX2 GENES STL QL NAA+NON-PROBE: NOT DETECTED
EPEC EAE GENE STL QL NAA+NON-PROBE: NOT DETECTED
ETEC LTA+ST1A+ST1B TOX ST NAA+NON-PROBE: NOT DETECTED
G LAMBLIA DNA STL QL NAA+NON-PROBE: NOT DETECTED
GEN 5 2HR TROPONIN T REFLEX: 13 NG/L
NOROVIRUS GI+II RNA STL QL NAA+NON-PROBE: NOT DETECTED
P SHIGELLOIDES DNA STL QL NAA+NON-PROBE: NOT DETECTED
POTASSIUM SERPL-SCNC: 2.8 MMOL/L (ref 3.5–5.2)
POTASSIUM SERPL-SCNC: 2.9 MMOL/L (ref 3.5–5.2)
QT INTERVAL: 438 MS
RVA RNA STL QL NAA+NON-PROBE: NOT DETECTED
S ENT+BONG DNA STL QL NAA+NON-PROBE: NOT DETECTED
SAPO I+II+IV+V RNA STL QL NAA+NON-PROBE: NOT DETECTED
SHIGELLA SP+EIEC IPAH ST NAA+NON-PROBE: NOT DETECTED
TROPONIN T DELTA: 5 NG/L
TROPONIN T SERPL HS-MCNC: 8 NG/L
V CHOL+PARA+VUL DNA STL QL NAA+NON-PROBE: NOT DETECTED
V CHOLERAE DNA STL QL NAA+NON-PROBE: NOT DETECTED
Y ENTEROCOL DNA STL QL NAA+NON-PROBE: NOT DETECTED

## 2023-05-27 PROCEDURE — 0 POTASSIUM CHLORIDE 10 MEQ/100ML SOLUTION: Performed by: NURSE PRACTITIONER

## 2023-05-27 PROCEDURE — 84484 ASSAY OF TROPONIN QUANT: CPT | Performed by: PHYSICIAN ASSISTANT

## 2023-05-27 PROCEDURE — 25010000002 HYDROMORPHONE 1 MG/ML SOLUTION: Performed by: NURSE PRACTITIONER

## 2023-05-27 PROCEDURE — 36415 COLL VENOUS BLD VENIPUNCTURE: CPT | Performed by: PHYSICIAN ASSISTANT

## 2023-05-27 PROCEDURE — G0378 HOSPITAL OBSERVATION PER HR: HCPCS

## 2023-05-27 PROCEDURE — 25010000002 PIPERACILLIN SOD-TAZOBACTAM PER 1 G: Performed by: NURSE PRACTITIONER

## 2023-05-27 PROCEDURE — 96366 THER/PROPH/DIAG IV INF ADDON: CPT

## 2023-05-27 PROCEDURE — 96375 TX/PRO/DX INJ NEW DRUG ADDON: CPT

## 2023-05-27 PROCEDURE — 84132 ASSAY OF SERUM POTASSIUM: CPT | Performed by: NURSE PRACTITIONER

## 2023-05-27 PROCEDURE — 25010000002 DROPERIDOL PER 5 MG: Performed by: NURSE PRACTITIONER

## 2023-05-27 PROCEDURE — 74177 CT ABD & PELVIS W/CONTRAST: CPT

## 2023-05-27 PROCEDURE — 99203 OFFICE O/P NEW LOW 30 MIN: CPT | Performed by: SURGERY

## 2023-05-27 PROCEDURE — 25510000001 IOPAMIDOL PER 1 ML: Performed by: EMERGENCY MEDICINE

## 2023-05-27 PROCEDURE — 74250 X-RAY XM SM INT 1CNTRST STD: CPT

## 2023-05-27 PROCEDURE — 25510000001 IOPAMIDOL PER 1 ML: Performed by: INTERNAL MEDICINE

## 2023-05-27 PROCEDURE — 96376 TX/PRO/DX INJ SAME DRUG ADON: CPT

## 2023-05-27 PROCEDURE — 84132 ASSAY OF SERUM POTASSIUM: CPT | Performed by: STUDENT IN AN ORGANIZED HEALTH CARE EDUCATION/TRAINING PROGRAM

## 2023-05-27 PROCEDURE — 96367 TX/PROPH/DG ADDL SEQ IV INF: CPT

## 2023-05-27 PROCEDURE — 25010000002 HYDROMORPHONE 1 MG/ML SOLUTION: Performed by: PHYSICIAN ASSISTANT

## 2023-05-27 PROCEDURE — 25010000002 DIPHENHYDRAMINE PER 50 MG: Performed by: NURSE PRACTITIONER

## 2023-05-27 RX ORDER — POTASSIUM CHLORIDE 20 MEQ/1
40 TABLET, EXTENDED RELEASE ORAL EVERY 4 HOURS
Status: COMPLETED | OUTPATIENT
Start: 2023-05-27 | End: 2023-05-27

## 2023-05-27 RX ORDER — SODIUM CHLORIDE 9 MG/ML
40 INJECTION, SOLUTION INTRAVENOUS AS NEEDED
Status: DISCONTINUED | OUTPATIENT
Start: 2023-05-27 | End: 2023-05-29 | Stop reason: HOSPADM

## 2023-05-27 RX ORDER — SODIUM CHLORIDE 0.9 % (FLUSH) 0.9 %
10 SYRINGE (ML) INJECTION EVERY 12 HOURS SCHEDULED
Status: DISCONTINUED | OUTPATIENT
Start: 2023-05-27 | End: 2023-05-29 | Stop reason: HOSPADM

## 2023-05-27 RX ORDER — SODIUM CHLORIDE 0.9 % (FLUSH) 0.9 %
10 SYRINGE (ML) INJECTION AS NEEDED
Status: DISCONTINUED | OUTPATIENT
Start: 2023-05-27 | End: 2023-05-29 | Stop reason: HOSPADM

## 2023-05-27 RX ORDER — DIPHENHYDRAMINE HYDROCHLORIDE 50 MG/ML
25 INJECTION INTRAMUSCULAR; INTRAVENOUS ONCE
Status: COMPLETED | OUTPATIENT
Start: 2023-05-27 | End: 2023-05-27

## 2023-05-27 RX ORDER — POLYETHYLENE GLYCOL 3350 17 G/17G
17 POWDER, FOR SOLUTION ORAL DAILY PRN
Status: DISCONTINUED | OUTPATIENT
Start: 2023-05-27 | End: 2023-05-27

## 2023-05-27 RX ORDER — OXYCODONE HYDROCHLORIDE 5 MG/1
10 TABLET ORAL EVERY 4 HOURS PRN
Status: DISCONTINUED | OUTPATIENT
Start: 2023-05-27 | End: 2023-05-29 | Stop reason: HOSPADM

## 2023-05-27 RX ORDER — PANTOPRAZOLE SODIUM 40 MG/10ML
40 INJECTION, POWDER, LYOPHILIZED, FOR SOLUTION INTRAVENOUS
Status: DISCONTINUED | OUTPATIENT
Start: 2023-05-27 | End: 2023-05-29 | Stop reason: HOSPADM

## 2023-05-27 RX ORDER — SODIUM CHLORIDE, SODIUM LACTATE, POTASSIUM CHLORIDE, CALCIUM CHLORIDE 600; 310; 30; 20 MG/100ML; MG/100ML; MG/100ML; MG/100ML
75 INJECTION, SOLUTION INTRAVENOUS CONTINUOUS
Status: DISCONTINUED | OUTPATIENT
Start: 2023-05-27 | End: 2023-05-29 | Stop reason: HOSPADM

## 2023-05-27 RX ORDER — BUPROPION HYDROCHLORIDE 300 MG/1
300 TABLET ORAL DAILY
COMMUNITY
Start: 2023-05-07

## 2023-05-27 RX ORDER — CHOLECALCIFEROL (VITAMIN D3) 125 MCG
5 CAPSULE ORAL NIGHTLY PRN
Status: DISCONTINUED | OUTPATIENT
Start: 2023-05-27 | End: 2023-05-29 | Stop reason: HOSPADM

## 2023-05-27 RX ORDER — DROPERIDOL 2.5 MG/ML
2.5 INJECTION, SOLUTION INTRAMUSCULAR; INTRAVENOUS ONCE
Status: COMPLETED | OUTPATIENT
Start: 2023-05-27 | End: 2023-05-27

## 2023-05-27 RX ORDER — BISACODYL 5 MG/1
5 TABLET, DELAYED RELEASE ORAL DAILY PRN
Status: DISCONTINUED | OUTPATIENT
Start: 2023-05-27 | End: 2023-05-27

## 2023-05-27 RX ORDER — BISACODYL 10 MG
10 SUPPOSITORY, RECTAL RECTAL DAILY PRN
Status: DISCONTINUED | OUTPATIENT
Start: 2023-05-27 | End: 2023-05-27

## 2023-05-27 RX ORDER — ONDANSETRON 2 MG/ML
4 INJECTION INTRAMUSCULAR; INTRAVENOUS EVERY 6 HOURS PRN
Status: DISCONTINUED | OUTPATIENT
Start: 2023-05-27 | End: 2023-05-29 | Stop reason: HOSPADM

## 2023-05-27 RX ORDER — OXYCODONE HYDROCHLORIDE 10 MG/1
10 TABLET ORAL 4 TIMES DAILY PRN
COMMUNITY
Start: 2023-05-22

## 2023-05-27 RX ORDER — AMOXICILLIN 250 MG
2 CAPSULE ORAL 2 TIMES DAILY
Status: DISCONTINUED | OUTPATIENT
Start: 2023-05-27 | End: 2023-05-27

## 2023-05-27 RX ADMIN — DROPERIDOL 2.5 MG: 2.5 INJECTION, SOLUTION INTRAMUSCULAR; INTRAVENOUS at 00:19

## 2023-05-27 RX ADMIN — POTASSIUM CHLORIDE 40 MEQ: 1500 TABLET, EXTENDED RELEASE ORAL at 23:28

## 2023-05-27 RX ADMIN — POTASSIUM CHLORIDE 10 MEQ: 7.46 INJECTION, SOLUTION INTRAVENOUS at 05:29

## 2023-05-27 RX ADMIN — POTASSIUM CHLORIDE 40 MEQ: 1500 TABLET, EXTENDED RELEASE ORAL at 16:21

## 2023-05-27 RX ADMIN — SODIUM CHLORIDE, POTASSIUM CHLORIDE, SODIUM LACTATE AND CALCIUM CHLORIDE 75 ML/HR: 600; 310; 30; 20 INJECTION, SOLUTION INTRAVENOUS at 05:29

## 2023-05-27 RX ADMIN — Medication 10 ML: at 02:50

## 2023-05-27 RX ADMIN — PIPERACILLIN AND TAZOBACTAM 3.38 G: 3; .375 INJECTION, POWDER, LYOPHILIZED, FOR SOLUTION INTRAVENOUS at 16:25

## 2023-05-27 RX ADMIN — IOPAMIDOL 100 ML: 755 INJECTION, SOLUTION INTRAVENOUS at 14:30

## 2023-05-27 RX ADMIN — Medication 5 MG: at 20:38

## 2023-05-27 RX ADMIN — PANTOPRAZOLE SODIUM 40 MG: 40 INJECTION, POWDER, LYOPHILIZED, FOR SOLUTION INTRAVENOUS at 05:28

## 2023-05-27 RX ADMIN — OXYCODONE 10 MG: 5 TABLET ORAL at 16:50

## 2023-05-27 RX ADMIN — POTASSIUM CHLORIDE 40 MEQ: 1500 TABLET, EXTENDED RELEASE ORAL at 20:38

## 2023-05-27 RX ADMIN — PIPERACILLIN AND TAZOBACTAM 3.38 G: 3; .375 INJECTION, POWDER, LYOPHILIZED, FOR SOLUTION INTRAVENOUS at 08:34

## 2023-05-27 RX ADMIN — IOPAMIDOL 100 ML: 755 INJECTION, SOLUTION INTRAVENOUS at 01:24

## 2023-05-27 RX ADMIN — OXYCODONE 10 MG: 5 TABLET ORAL at 20:38

## 2023-05-27 RX ADMIN — HYDROMORPHONE HYDROCHLORIDE 0.5 MG: 1 INJECTION, SOLUTION INTRAMUSCULAR; INTRAVENOUS; SUBCUTANEOUS at 08:34

## 2023-05-27 RX ADMIN — PIPERACILLIN AND TAZOBACTAM 3.38 G: 3; .375 INJECTION, POWDER, LYOPHILIZED, FOR SOLUTION INTRAVENOUS at 02:00

## 2023-05-27 RX ADMIN — POTASSIUM CHLORIDE 10 MEQ: 7.46 INJECTION, SOLUTION INTRAVENOUS at 06:23

## 2023-05-27 RX ADMIN — Medication 10 ML: at 08:34

## 2023-05-27 RX ADMIN — HYDROMORPHONE HYDROCHLORIDE 0.5 MG: 1 INJECTION, SOLUTION INTRAMUSCULAR; INTRAVENOUS; SUBCUTANEOUS at 02:50

## 2023-05-27 RX ADMIN — PIPERACILLIN AND TAZOBACTAM 3.38 G: 3; .375 INJECTION, POWDER, LYOPHILIZED, FOR SOLUTION INTRAVENOUS at 23:28

## 2023-05-27 RX ADMIN — Medication 10 ML: at 20:38

## 2023-05-27 RX ADMIN — POTASSIUM CHLORIDE 10 MEQ: 7.46 INJECTION, SOLUTION INTRAVENOUS at 02:51

## 2023-05-27 RX ADMIN — DIPHENHYDRAMINE HYDROCHLORIDE 25 MG: 50 INJECTION, SOLUTION INTRAMUSCULAR; INTRAVENOUS at 00:19

## 2023-05-27 NOTE — PLAN OF CARE
Goal Outcome Evaluation:  Plan of Care Reviewed With: patient        Progress: no change  Outcome Evaluation: Patient resting in bed throughout shift. NPO order contines. small bowel follow through performed. replacing potassium. complaints of pain treated per mar. no further complaints.

## 2023-05-27 NOTE — H&P
[facility] Medicine Services  History & Physical    Patient Name: Kavita Garcia  : 1960  MRN: [id number]  Primary Care Physician:  Gabby, No Known  Date of admission: 2023  Date and Time of Service: 23 at 0300    Subjective      Chief Complaint: Abdominal pain n/v/d    History of Present Illness: Kavita Garcia is a 62 y.o. female with past medical history of osteoarthritis, osteoporosis, degenerative disc disease, lumbar radiculopathy, neuropathy, history of colon perforation in , hypertension, hyperlipidemia, hypothyroidism, anxiety and depression who presented to [facility] on 2023 complaining of abdominal pain nausea vomiting and diarrhea.    Patient reports stomach pain over the past 3 weeks in the left lower quadrant.  She reports diarrhea multiple times a day.  Over the past 3 days has had vomiting chills and has had progressive weakness.  Denies any shortness of breath or chest pain.  She takes oxycodone routinely at home for chronic pain and has been having new leg cramps.    In the ED she is found to have a low potassium 2.7, sodium 133, BUN 11 and creatinine of 1 magnesium 1.4.  White blood cell within normal limits at 8 but has 29% bands.  GI panel is negative.  CT of the abdomen showed dilation of the proximal small bowel loops up to 4 cm with eventual collapse in the right lower quadrant.  No abrupt transition point is identified.  Findings indicate a high-grade partial small bowel obstruction.  No perforation or abscess.  No bowel wall thickening or inflammation.  General surgery has been consulted.    Review of Systems   Constitutional: Positive for chills, decreased appetite and malaise/fatigue.   Cardiovascular: Negative.    Respiratory: Negative.    Skin: Negative.    Musculoskeletal: Positive for arthritis, back pain and myalgias.   Gastrointestinal: Positive for abdominal pain, diarrhea, nausea and vomiting.   Genitourinary: Negative.     Neurological: Positive for weakness.   Psychiatric/Behavioral: Positive for depression.        Personal History     Past Medical History:   Diagnosis Date   • Arthritis    • Colon perforation 2012   • Depression    • GERD    • Hypothyroidism    • MAMMO    • Neuropathy     Hands/ Feet   • PAP    • Scoliosis 2010   • Stomach ulcer 2012       Past Surgical History:   Procedure Laterality Date   • BACK SURGERY  [id number]    6 surgeries   • COLON SURGERY      Colon Resection   • COLONOSCOPY         Family History: family history includes Alcohol abuse in her father; Arthritis in her mother; Cancer in her maternal aunt; Cancer (age of onset: 58) in her mother; Hyperlipidemia in her mother; Hypertension in her mother; Liver disease in her father; Osteoporosis in her mother; Stroke in her mother; Thyroid disease in her mother and sister. Otherwise pertinent FHx was reviewed and not pertinent to current issue.    Social History:  reports that she quit smoking about 13 years ago. Her smoking use included cigarettes. She has a 12.50 pack-year smoking history. She has never used smokeless tobacco. She reports current drug use. She reports that she does not drink alcohol.    Home Medications:  Prior to Admission Medications     Prescriptions Last Dose Informant Patient Reported? Taking?    atorvastatin (LIPITOR) 80 MG tablet   Yes No    famotidine (PEPCID) 40 MG tablet   No No    TAKE 1 TABLET BY MOUTH DAILY.     fluconazole (Diflucan) 150 MG tablet   No No    1 po today and may repeat x1 in 4 days if still having symptoms    FLUoxetine (PROzac) 20 MG capsule   No No    TAKE 1 CAPSULE BY MOUTH DAILY.     gabapentin (NEURONTIN) 800 MG tablet   Yes No    GABAPENTIN 800 MG TABS    levothyroxine (SYNTHROID, LEVOTHROID) 125 MCG tablet   No No    Take 1 tablet by mouth Daily.    pregabalin (LYRICA) 75 MG capsule   No No    TAKE 1 CAPSULE BY MOUTH 3 (THREE) TIMES A DAY.     zolpidem (AMBIEN) 10 MG tablet   No No    TAKE 1 TABLET BY  MOUTH AT NIGHT AS NEEDED FOR SLEEP.             Allergies:  Allergies   Allergen Reactions   • Duloxetine Hcl Itching   • Erythromycin Hives   • Morphine Hives   • Sulfa Antibiotics Hives   • Trazodone Other (See Comments)     NIGHTMARES       Objective      Vitals:   Temp:  [97.9 °F (36.6 °C)] 97.9 °F (36.6 °C)  Heart Rate:  [75-93] 75  Resp:  [16] 16  BP: ()/(63-89) 90/63    Physical Exam  Constitutional:       Appearance: She is ill-appearing.   HENT:      Mouth/Throat:      Mouth: Mucous membranes are dry.   Eyes:      Pupils: Pupils are equal, round, and reactive to light.   Cardiovascular:      Rate and Rhythm: Normal rate and regular rhythm.      Pulses: Normal pulses.   Pulmonary:      Effort: Pulmonary effort is normal.      Breath sounds: Normal breath sounds.   Abdominal:      Palpations: Abdomen is soft.      Tenderness: There is abdominal tenderness. There is no guarding.   Musculoskeletal:         General: Normal range of motion.   Skin:     General: Skin is warm and dry.   Neurological:      Mental Status: She is alert and oriented to person, place, and time.   Psychiatric:         Mood and Affect: Mood normal.          Result Review    Result Review:  I have personally reviewed the results from the time of this admission to 5/27/2023 03:07 EDT and agree with these findings:  [x]  Laboratory  []  Microbiology  [x]  Radiology  [x]  EKG/Telemetry   []  Cardiology/Vascular   []  Pathology  []  Old records  []  Other:  Most notable findings include: see above HPI     Assessment & Plan        Active Hospital Problems:  Active Hospital Problems    Diagnosis    • **Nausea and vomiting, unspecified vomiting type      Plan:   Abdominal pain, Nausea, Vomiting  2/2 to high grade partial small bowel obstruction  - NPO  -IVF  -Analgesics antiemetics  -We will add prophylactic Zosyn until evaluated by general surgery  -General surgery consult    Hypokalemia and hypomagnesia   -Likely secondary to persistent  nausea vomiting diarrhea  -Replace per protocol    History of chronic pain due to lumbar radiculopathy, neuropathy and osteoarthritis and osteoporosis  -Hold oral home dose oxycodone and gabapentin  -IV analgesics until able to take oral medication    History anxiety depression  -Resume oral Prozac once able to take medication    GERD  -We will substitute IV PPI for oral Pepcid    Hypothyroidism  -Resume home Synthroid when able to take p.o.      DVT prophylaxis:  Mechanical DVT prophylaxis orders are present.    CODE STATUS:    Code Status (Patient has no pulse and is not breathing): CPR (Attempt to Resuscitate)  Medical Interventions (Patient has pulse or is breathing): Full Support    Admission Status:  I believe this patient meets observation status.    I discussed the patient's findings and my recommendations with patient.    This patient has been examined wearing appropriate Personal Protective Equipment     Signature: Electronically signed by LAMINE Mann, 05/27/23, 03:07 EDT.  [facility] Hospitalist Team

## 2023-05-27 NOTE — ED PROVIDER NOTES
Subjective   History of Present Illness  Patient is a 62-year-old white female with a history of GERD, peptic ulcer disease, chronic pain and peripheral neuropathy.  She presents today from home with complaints of nausea vomiting and diarrhea for the last 3 weeks.  She states she not been able to keep down much food or fluids.  She states today her stools looked very dark in color.  She states she has been taking Pepto-Bismol.  She reports a decrease in urine output.  She denies fever chills cough or congestion chest pain or shortness of breath.  She complains of diffuse abdominal discomfort.  She denies any known ill contacts recent travel or recent antibiotic use.  She also complains of feeling generally weak.        Review of Systems   Constitutional: Negative for chills and fever.   Respiratory: Negative for cough and shortness of breath.    Cardiovascular: Negative for chest pain.   Gastrointestinal: Positive for abdominal pain, diarrhea, nausea and vomiting.   Genitourinary: Positive for decreased urine volume. Negative for dysuria.   Neurological: Positive for weakness.       Past Medical History:   Diagnosis Date   • Arthritis    • Colon perforation 2012   • Depression    • GERD    • Hypothyroidism    • MAMMO    • Neuropathy     Hands/ Feet   • PAP    • Scoliosis 2010   • Stomach ulcer 2012       Allergies   Allergen Reactions   • Duloxetine Hcl Itching   • Erythromycin Hives   • Morphine Hives   • Sulfa Antibiotics Hives   • Trazodone Other (See Comments)     NIGHTMARES       Past Surgical History:   Procedure Laterality Date   • BACK SURGERY  [id number]    6 surgeries   • COLON SURGERY      Colon Resection   • COLONOSCOPY         Family History   Problem Relation Age of Onset   • Arthritis Mother    • Cancer Mother 58        Colon Cancer   • Osteoporosis Mother    • Hypertension Mother    • Stroke Mother    • Hyperlipidemia Mother    • Thyroid disease Mother    • Liver disease Father    • Alcohol abuse  Father    • Thyroid disease Sister    • Cancer Maternal Aunt         lung       Social History     Socioeconomic History   • Marital status:    Tobacco Use   • Smoking status: Former     Packs/day: 0.50     Years: 25.00     Pack years: 12.50     Types: Cigarettes     Quit date:      Years since quittin.4   • Smokeless tobacco: Never   Substance and Sexual Activity   • Alcohol use: Never   • Drug use: Yes     Comment: Marijuana ---- occasionally   • Sexual activity: Not Currently           Objective   Physical Exam  Vital signs and triage nurse note reviewed.  Constitutional: Awake, alert; well-developed and well-nourished. No acute distress is noted.  Chronically ill in appearance.  HEENT: Normocephalic, atraumatic; pupils are PERRL with intact EOM; oropharynx is pink and moist without exudate or erythema.  No drooling or pooling of oral secretions.  Neck: Supple, full range of motion without pain; no cervical lymphadenopathy. Normal phonation.  Cardiovascular: Regular rate and rhythm, normal S1-S2.  No murmur noted.  Pulmonary: Respiratory effort regular nonlabored, breath sounds clear to auscultation all fields.  Abdomen: Soft, nontender, nondistended with normoactive bowel sounds; no rebound or guarding.  Musculoskeletal: Independent range of motion of all extremities with no palpable tenderness or edema.  Neuro: Alert oriented x3, speech is clear and appropriate, GCS 15.    Skin: Flesh tone, warm, dry, intact; no erythematous or petechial rash or lesion.      Procedures           ED Course  ED Course as of 23 0259   Sat May 27, 2023   0038 Hemoccult negative. [MD]   0038 Care turned over to BAUTISTA Olivares pending CT results and disposition. [MD]   0057 Awaiting UA and CT [RL]   0215 EKG independently interpreted by myself shows sinus rhythm 79 bpm, no ST elevation apparent.  Repull abnormality in anterior lateral leads.  Prolonged QT interval.  These appear new since prior study done on  9/19/2014 that showed sinus rhythm 66 bpm, no ST elevation apparent at that time.  Findings confirmed by .  [RL]      ED Course User Index  [MD] Maria Elena June APRN  [RL] Marshall Bird PA      Labs Reviewed   COMPREHENSIVE METABOLIC PANEL - Abnormal; Notable for the following components:       Result Value    Glucose 152 (*)     Sodium 133 (*)     Potassium 2.7 (*)     CO2 17.0 (*)     Alkaline Phosphatase 130 (*)     Anion Gap 16.0 (*)     All other components within normal limits    Narrative:     GFR Normal >60  Chronic Kidney Disease <60  Kidney Failure <15     MAGNESIUM - Abnormal; Notable for the following components:    Magnesium 1.4 (*)     All other components within normal limits   MANUAL DIFFERENTIAL - Abnormal; Notable for the following components:    Lymphocyte % 2.0 (*)     Monocyte % 16.0 (*)     Bands %  29.0 (*)     Lymphocytes Absolute 0.16 (*)     Monocytes Absolute 1.28 (*)     All other components within normal limits    Narrative:     The previously reported component WBC Morphology is no longer being reported. Previous result was Normal (Reference Range: Normal) on 5/26/2023 at 2233 EDT.   GASTROINTESTINAL PANEL, PCR (PREFERRED) DOES NOT INCLUDE CDIFF - Normal    Narrative:     If Aeromonas, Staphylococcus aureus or Bacillus cereus are suspected, please order [id number]: Stool Culture, Aeromonas, S aureus, B Cereus.   LIPASE - Normal   CBC WITH AUTO DIFFERENTIAL - Normal    Narrative:     The previously reported component NRBC is no longer being reported. Previous result was 0.1 /100 WBC (Reference Range: 0.0-0.2 /100 WBC) on 5/26/2023 at 2158 EDT.   SCAN SLIDE   URINALYSIS W/ MICROSCOPIC IF INDICATED (NO CULTURE)   TROPONIN   POTASSIUM   CBC AND DIFFERENTIAL    Narrative:     The following orders were created for panel order CBC & Differential.  Procedure                               Abnormality         Status                     ---------                               -----------          ------                     CBC Auto Differential[[id number]]        Normal              Final result               Scan Slide[[id number]]                                       Final result                 Please view results for these tests on the individual orders.     CT Abdomen Pelvis With Contrast    Result Date: 5/27/2023  1.  Dilatation of the proximal small bowel up to about 4 cm with eventual collapse in the right lower quadrant. No abrupt transition point is identified. Findings indicate at least a high-grade partial small bowel obstruction. Bowel wall enhances normally. No bowel wall thickening or inflammation. No perforation or abscess. Electronically signed by:  Jong Carias M.D.  5/26/2023 11:43 PM Mountain Time    Medications   sodium chloride 0.9 % flush 10 mL (10 mL Intravenous Given 5/27/23 0250)   potassium chloride 10 mEq in 100 mL IVPB (10 mEq Intravenous New Bag 5/27/23 0251)   lactated ringers bolus 1,000 mL (0 mL Intravenous Stopped 5/26/23 2310)   ondansetron (ZOFRAN) injection 4 mg (4 mg Intravenous Given 5/26/23 2216)   magnesium sulfate 2g/50 mL (PREMIX) infusion (0 g Intravenous Stopped 5/27/23 0010)   ketorolac (TORADOL) injection 15 mg (15 mg Intravenous Given 5/26/23 2326)   droperidol (INAPSINE) injection 2.5 mg (2.5 mg Intravenous Given 5/27/23 0019)   diphenhydrAMINE (BENADRYL) injection 25 mg (25 mg Intravenous Given 5/27/23 0019)   piperacillin-tazobactam (ZOSYN) IVPB 3.375 g in 100 mL NS (CD) (0 g Intravenous Stopped 5/27/23 0251)   iopamidol (ISOVUE-370) 76 % injection 100 mL (100 mL Intravenous Given 5/27/23 0124)   HYDROmorphone (DILAUDID) injection 0.5 mg (0.5 mg Intravenous Given 5/27/23 0250)                                          Medical Decision Making  Patient presents today with complaints of nausea vomiting diarrhea for 3 weeks, her stools appear dark in color today.  She complains of some diffuse abdominal discomfort and general weakness.    Patient had  "above exam and evaluation.  IV was established.  Labs were obtained.  She was given a liter of IV fluids as well as Zofran for nausea.    Work-up: CBC is grossly unremarkable.  Metabolic panel is significant for potassium 2.7, CO2 17.0, alk phos 130, anion gap 16.  Normal lipase.    Patient was started on IV potassium per replacement protocol.  Patient now complains of a headache.  She is given Toradol.  She also notified the nursing staff that she is also having some increased chronic pain in her lower back.  She was given a dose of Inapsine with Benadryl.    Amount and/or Complexity of Data Reviewed  Labs: ordered.      Risk  Prescription drug management.          Differential diagnosis: Small bowel obstruction, colitis, not meant to be an all-inclusive list  Chart review: Last family medicine note on 12/1/2020, patient was seen to establish care for depression, peripheral neuropathy, insomnia as well as vaginitis    EKG: See above    Imaging: See above    Labs: Lab work independently interpreted by myself shows GI panel negative.  CBC largely unremarkable although 29% bands.  Troponin ordered upon admission.  CMP shows potassium of 2.7 otherwise unremarkable CMP.     Vitals: BP 90/63   Pulse 75   Temp 97.9 °F (36.6 °C) (Oral)   Resp 16   Ht 165.1 cm (65\")   Wt 63.5 kg (139 lb 15.9 oz)   SpO2 99%   BMI 23.30 kg/m²     Medications given:    Medications   sodium chloride 0.9 % flush 10 mL (10 mL Intravenous Given 5/27/23 0250)   potassium chloride 10 mEq in 100 mL IVPB (10 mEq Intravenous New Bag 5/27/23 0251)   lactated ringers bolus 1,000 mL (0 mL Intravenous Stopped 5/26/23 2310)   ondansetron (ZOFRAN) injection 4 mg (4 mg Intravenous Given 5/26/23 2216)   magnesium sulfate 2g/50 mL (PREMIX) infusion (0 g Intravenous Stopped 5/27/23 0010)   ketorolac (TORADOL) injection 15 mg (15 mg Intravenous Given 5/26/23 2326)   droperidol (INAPSINE) injection 2.5 mg (2.5 mg Intravenous Given 5/27/23 0019) "   diphenhydrAMINE (BENADRYL) injection 25 mg (25 mg Intravenous Given 5/27/23 0019)   piperacillin-tazobactam (ZOSYN) IVPB 3.375 g in 100 mL NS (CD) (0 g Intravenous Stopped 5/27/23 0251)   iopamidol (ISOVUE-370) 76 % injection 100 mL (100 mL Intravenous Given 5/27/23 0124)   HYDROmorphone (DILAUDID) injection 0.5 mg (0.5 mg Intravenous Given 5/27/23 0250)       Procedures:  Not indicated  MDM: Patient is a 62-year-old female and had a initial HPI, ROS and physical exam and initial MDM by nurse practitioner, Chante.  Patient care was transferred to myself pending CT and disposition of the patient and likely admission.  CT shows partial high-grade small bowel obstruction.  Patient denies any nausea or vomiting on my exam and appears no acute distress.  Given patient's increased bands patient was started on Zosyn with suspected intra-abdominal infection.  Patient was given 1 L of LR as well as potassium and magnesium replacement here in ED.  Patient does have an abnormal EKG may be electrolyte induced.  Troponin ordered upon admission however no chest pain or shortness of breath reported.  General surgery consult for the morning was placed.  Spoke with LAMINE Merritt, accepted patient behalf of Dr. Romo for admission to hospital further small bowel obstruction work-up and treatment. See full discharge instructions for further details.  Results and plan discussed with patient and is agreeable with plan.      Final diagnoses:   Nausea and vomiting, unspecified vomiting type   Diarrhea, unspecified type   Generalized abdominal pain   Hypokalemia   Hypomagnesemia   Small bowel obstruction   Abnormal EKG       ED Disposition  ED Disposition     ED Disposition   Decision to Admit    Condition   --    Comment   Level of Care: Telemetry [5]   Admitting Physician: PEEWEE ROMO [[id number]]   Attending Physician: PEEWEE ROMO [[id number]]               No follow-up provider specified.       Medication List      No changes were  made to your prescriptions during this visit.          Marshall Bird PA  05/27/23 0258

## 2023-05-27 NOTE — ED NOTES
Nursing report ED to floor  Kavita Garcia  62 y.o.  female    HPI:   Chief Complaint   Patient presents with    Abdominal Pain       Admitting doctor:   Vianney Garsia DO    Admitting diagnosis:   The primary encounter diagnosis was Nausea and vomiting, unspecified vomiting type. Diagnoses of Diarrhea, unspecified type, Generalized abdominal pain, Hypokalemia, Hypomagnesemia, Small bowel obstruction, and Abnormal EKG were also pertinent to this visit.    Code status:   Current Code Status       Date Active Code Status Order ID Comments User Context       5/27/2023 0307 CPR (Attempt to Resuscitate) [id number]  Shaina Saenz APRN ED        Question Answer    Code Status (Patient has no pulse and is not breathing) CPR (Attempt to Resuscitate)    Medical Interventions (Patient has pulse or is breathing) Full Support                    Allergies:   Duloxetine hcl, Erythromycin, Morphine, Sulfa antibiotics, and Trazodone    Isolation:  No active isolations     Fall Risk:  Fall Risk Assessment was completed, and patient is at moderate risk for falls.   Predictive Model Details         9 (Low) Factor Value    Calculated 5/27/2023 02:07 Age 62    Risk of Fall Model Musculoskeletal Assessment WDL     Active Peripheral IV Present     Imaging order in this encounter Present     Number of Distinct Medication Classes administered 7     Skin Assessment WDL     Financial Class Other     Magnesium 1.4 mg/dL     Tobacco Use Quit     Clyde Scale not on file     Number of administrations of Anti-Rheumatics 1     Peripheral Vascular Assessment WDL     Chloride 100 mmol/L     Total Bilirubin 0.8 mg/dL     Creatinine 1 mg/dL     Diastolic BP 89     Cardiac Assessment WDL     Respiratory Rate 16     Gastrointestinal Assessment X     Days after Admission 0.208     ALT 17 U/L     Potassium 2.7 mmol/L     Calcium 9.9 mg/dL     Albumin 4.7 g/dL         Weight:       05/26/23 2102   Weight: 63.5 kg (139 lb 15.9 oz)       Intake and  "Output    Intake/Output Summary (Last 24 hours) at 5/27/2023 0327  Last data filed at 5/27/2023 0251  Gross per 24 hour   Intake 1250 ml   Output --   Net 1250 ml       Diet:   Dietary Orders (From admission, onward)       Start     Ordered    05/27/23 0307  NPO Diet NPO Type: Strict NPO  Diet Effective Now        Comments: Provide moist oral swabs   Question:  NPO Type  Answer:  Strict NPO    05/27/23 0307                     Most recent vitals:   Vitals:    05/26/23 2102 05/27/23 0130   BP: 138/89 90/63   Pulse: 93 75   Resp: 16    Temp: 97.9 °F (36.6 °C)    TempSrc: Oral    SpO2: 98% 99%   Weight: 63.5 kg (139 lb 15.9 oz)    Height: 165.1 cm (65\")        Active LDAs/IV Access:   Lines, Drains & Airways       Active LDAs       Name Placement date Placement time Site Days    Peripheral IV 05/27/23 0247 Left Antecubital 05/27/23 0247  Antecubital  less than 1                    Skin Condition:   Skin Assessments (last day)       None             Labs (abnormal labs have a star):   Labs Reviewed   COMPREHENSIVE METABOLIC PANEL - Abnormal; Notable for the following components:       Result Value    Glucose 152 (*)     Sodium 133 (*)     Potassium 2.7 (*)     CO2 17.0 (*)     Alkaline Phosphatase 130 (*)     Anion Gap 16.0 (*)     All other components within normal limits    Narrative:     GFR Normal >60  Chronic Kidney Disease <60  Kidney Failure <15     MAGNESIUM - Abnormal; Notable for the following components:    Magnesium 1.4 (*)     All other components within normal limits   MANUAL DIFFERENTIAL - Abnormal; Notable for the following components:    Lymphocyte % 2.0 (*)     Monocyte % 16.0 (*)     Bands %  29.0 (*)     Lymphocytes Absolute 0.16 (*)     Monocytes Absolute 1.28 (*)     All other components within normal limits    Narrative:     The previously reported component WBC Morphology is no longer being reported. Previous result was Normal (Reference Range: Normal) on 5/26/2023 at 2233 EDT. "   GASTROINTESTINAL PANEL, PCR (PREFERRED) DOES NOT INCLUDE CDIFF - Normal    Narrative:     If Aeromonas, Staphylococcus aureus or Bacillus cereus are suspected, please order [id number]: Stool Culture, Aeromonas, S aureus, B Cereus.   LIPASE - Normal   CBC WITH AUTO DIFFERENTIAL - Normal    Narrative:     The previously reported component NRBC is no longer being reported. Previous result was 0.1 /100 WBC (Reference Range: 0.0-0.2 /100 WBC) on 5/26/2023 at 2158 EDT.   SCAN SLIDE   URINALYSIS W/ MICROSCOPIC IF INDICATED (NO CULTURE)   TROPONIN   POTASSIUM   CBC AND DIFFERENTIAL    Narrative:     The following orders were created for panel order CBC & Differential.  Procedure                               Abnormality         Status                     ---------                               -----------         ------                     CBC Auto Differential[[id number]]        Normal              Final result               Scan Slide[[id number]]                                       Final result                 Please view results for these tests on the individual orders.       LOC: Person, Place, Time, and Situation    Telemetry:  Med/Surg    Cardiac Monitoring Ordered: yes    EKG:   ECG 12 Lead Electrolyte Imbalance   Preliminary Result   HEART RATE= 79  bpm   RR Interval= 760  ms   UT Interval= 150  ms   P Horizontal Axis= 1  deg   P Front Axis= 52  deg   QRSD Interval= 102  ms   QT Interval= 438  ms   QRS Axis= 33  deg   T Wave Axis= 202  deg   - ABNORMAL ECG -   Sinus rhythm   Repol abnrm suggests ischemia, anterolateral   Prolonged QT interval   When compared with ECG of 19-Sep-2014 12:05:17,   New or worsened ischemia or infarction   Significant repolarization change   Electronically Signed By:    Date and Time of Study: 2023-05-27 02:10:04          Medications Given in the ED:   Medications   sodium chloride 0.9 % flush 10 mL (10 mL Intravenous Given 5/27/23 0250)   potassium chloride 10 mEq in 100 mL IVPB (10 mEq  Intravenous New Bag 5/27/23 0251)   Potassium Replacement - Follow Nurse / BPA Driven Protocol (has no administration in time range)   Magnesium Standard Dose Replacement - Follow Nurse / BPA Driven Protocol (has no administration in time range)   piperacillin-tazobactam (ZOSYN) IVPB 3.375 g in 100 mL NS (CD) (has no administration in time range)   lactated ringers infusion (has no administration in time range)   HYDROmorphone (DILAUDID) injection 0.5 mg (has no administration in time range)   ondansetron (ZOFRAN) injection 4 mg (has no administration in time range)   sodium chloride 0.9 % flush 10 mL (has no administration in time range)   sodium chloride 0.9 % flush 10 mL (has no administration in time range)   sodium chloride 0.9 % infusion 40 mL (has no administration in time range)   sennosides-docusate (PERICOLACE) 8.6-50 MG per tablet 2 tablet (has no administration in time range)     And   polyethylene glycol (MIRALAX) packet 17 g (has no administration in time range)     And   bisacodyl (DULCOLAX) EC tablet 5 mg (has no administration in time range)     And   bisacodyl (DULCOLAX) suppository 10 mg (has no administration in time range)   pantoprazole (PROTONIX) injection 40 mg (has no administration in time range)   lactated ringers bolus 1,000 mL (0 mL Intravenous Stopped 5/26/23 2310)   ondansetron (ZOFRAN) injection 4 mg (4 mg Intravenous Given 5/26/23 2216)   magnesium sulfate 2g/50 mL (PREMIX) infusion (0 g Intravenous Stopped 5/27/23 0010)   ketorolac (TORADOL) injection 15 mg (15 mg Intravenous Given 5/26/23 2326)   droperidol (INAPSINE) injection 2.5 mg (2.5 mg Intravenous Given 5/27/23 0019)   diphenhydrAMINE (BENADRYL) injection 25 mg (25 mg Intravenous Given 5/27/23 0019)   piperacillin-tazobactam (ZOSYN) IVPB 3.375 g in 100 mL NS (CD) (0 g Intravenous Stopped 5/27/23 0251)   iopamidol (ISOVUE-370) 76 % injection 100 mL (100 mL Intravenous Given 5/27/23 0124)   HYDROmorphone (DILAUDID) injection  0.5 mg (0.5 mg Intravenous Given 23 0250)       Imaging results:  CT Abdomen Pelvis With Contrast    Result Date: 2023  1.  Dilatation of the proximal small bowel up to about 4 cm with eventual collapse in the right lower quadrant. No abrupt transition point is identified. Findings indicate at least a high-grade partial small bowel obstruction. Bowel wall enhances normally. No bowel wall thickening or inflammation. No perforation or abscess. Electronically signed by:  Jong Carias M.D.  2023 11:43 PM Mountain Time     Social issues:   Social History     Socioeconomic History    Marital status:    Tobacco Use    Smoking status: Former     Packs/day: 0.50     Years: 25.00     Pack years: 12.50     Types: Cigarettes     Quit date:      Years since quittin.4    Smokeless tobacco: Never   Substance and Sexual Activity    Alcohol use: Never    Drug use: Yes     Comment: Marijuana ---- occasionally    Sexual activity: Not Currently       NIH Stroke Scale:  Interval: (not recorded)  1a. Level of Consciousness: (not recorded)  1b. LOC Questions: (not recorded)  1c. LOC Commands: (not recorded)  2. Best Gaze: (not recorded)  3. Visual: (not recorded)  4. Facial Palsy: (not recorded)  5a. Motor Arm, Left: (not recorded)  5b. Motor Arm, Right: (not recorded)  6a. Motor Leg, Left: (not recorded)  6b. Motor Leg, Right: (not recorded)  7. Limb Ataxia: (not recorded)  8. Sensory: (not recorded)  9. Best Language: (not recorded)  10. Dysarthria: (not recorded)  11. Extinction and Inattention (formerly Neglect): (not recorded)    Total (NIH Stroke Scale): (not recorded)     Additional notable assessment information:     Nursing report ED to floor:      Marci Hylton RN   23 03:27 EDT

## 2023-05-27 NOTE — CONSULTS
GENERAL SURGERY CONSULT    Referring Provider: Pelon  Reason for Consultation: possible small bowel obstruction, vomiting    Patient Care Team:  Provider, No Known as PCP - General    Chief complaint vomiting, diarrhea    Subjective .     History of present illness: 62-year-old lady who presented to the emergency department last night due to vomiting of a few days duration.  She relates to having some abdominal discomfort related to the vomiting.  She states that for the past 3 weeks or so she has been having diarrhea multiple times per day.  She presented to the emergency department due to the vomiting and a CT scan was performed that showed proximal dilation of the small bowel with distal decompression without definitive transition point seen.  She was admitted for further care.  At the time of my visit the patient appears comfortable in bed and has had no recent vomiting.    Review of Systems    Review of Systems   Gastrointestinal: Positive for diarrhea and vomiting.         History  Past Medical History:   Diagnosis Date   • Arthritis    • Colon perforation    • Depression    • GERD    • Hypothyroidism    • MAMMO    • Neuropathy     Hands/ Feet   • PAP    • Scoliosis    • Stomach ulcer    ,   Past Surgical History:   Procedure Laterality Date   • BACK SURGERY  [id number]    6 surgeries   • COLON SURGERY      Colon Resection   • COLONOSCOPY     ,   Family History   Problem Relation Age of Onset   • Arthritis Mother    • Cancer Mother 58        Colon Cancer   • Osteoporosis Mother    • Hypertension Mother    • Stroke Mother    • Hyperlipidemia Mother    • Thyroid disease Mother    • Liver disease Father    • Alcohol abuse Father    • Thyroid disease Sister    • Cancer Maternal Aunt         lung   ,   Social History     Tobacco Use   • Smoking status: Former     Packs/day: 0.50     Years: 25.00     Pack years: 12.50     Types: Cigarettes     Quit date:      Years since quittin.4   • Smokeless  tobacco: Never   Vaping Use   • Vaping Use: Never used   Substance Use Topics   • Alcohol use: Never   • Drug use: Not Currently     Comment: Marijuana ---- occasionally   ,   Medications Prior to Admission   Medication Sig Dispense Refill Last Dose   • atorvastatin (LIPITOR) 80 MG tablet Take 1 tablet by mouth Every Night.   Past Week   • famotidine (PEPCID) 40 MG tablet TAKE 1 TABLET BY MOUTH DAILY.  (Patient taking differently: Take 20 mg by mouth Daily.) 90 tablet 0 Past Week   • FLUoxetine (PROzac) 20 MG capsule TAKE 1 CAPSULE BY MOUTH DAILY.  (Patient taking differently: Take 2 capsules by mouth Daily.) 90 capsule 0 Past Week   • levothyroxine (SYNTHROID, LEVOTHROID) 125 MCG tablet Take 1 tablet by mouth Daily. 90 tablet 0 5/26/2023   • zolpidem (AMBIEN) 10 MG tablet TAKE 1 TABLET BY MOUTH AT NIGHT AS NEEDED FOR SLEEP.  30 tablet 1 Past Week   • buPROPion XL (WELLBUTRIN XL) 300 MG 24 hr tablet Take 1 tablet by mouth Daily.      • fluconazole (Diflucan) 150 MG tablet 1 po today and may repeat x1 in 4 days if still having symptoms 2 tablet 0    • gabapentin (NEURONTIN) 800 MG tablet GABAPENTIN 800 MG TABS      • oxyCODONE (ROXICODONE) 10 MG tablet Take 1 tablet by mouth 4 (Four) Times a Day As Needed.      • pregabalin (LYRICA) 75 MG capsule TAKE 1 CAPSULE BY MOUTH 3 (THREE) TIMES A DAY.  90 capsule 1    , Scheduled Meds:  pantoprazole, 40 mg, Intravenous, Q AM  piperacillin-tazobactam, 3.375 g, Intravenous, Q8H  sodium chloride, 10 mL, Intravenous, Q12H    , Continuous Infusions:  lactated ringers, 75 mL/hr, Last Rate: 75 mL/hr (05/27/23 0835)    , PRN Meds:  •  HYDROmorphone  •  Magnesium Standard Dose Replacement - Follow Nurse / BPA Driven Protocol  •  ondansetron  •  oxyCODONE  •  Potassium Replacement - Follow Nurse / BPA Driven Protocol  •  Potassium Replacement - Follow Nurse / BPA Driven Protocol  •  [COMPLETED] Insert Peripheral IV **AND** sodium chloride  •  sodium chloride  •  sodium chloride and  Allergies:  Duloxetine hcl, Erythromycin, Morphine, Sulfa antibiotics, and Trazodone    Objective     Vital Signs   Temp:  [97.8 °F (36.6 °C)-98.1 °F (36.7 °C)] 98.1 °F (36.7 °C)  Heart Rate:  [64-93] 64  Resp:  [14-19] 19  BP: ()/(58-89) 102/65    Physical Exam:       General Appearance:    Alert, cooperative, in no acute distress   Head:    Normocephalic, without obvious abnormality, atraumatic   Eyes:            Lids and lashes normal, conjunctivae and sclerae normal, no   icterus, no pallor, corneas clear   Ears:    Ears appear intact with no abnormalities noted   Lungs:    Breathing unlabored   Abdomen:     Soft, nontender, nondistended   Extremities:   Moves all extremities well   Skin:   No bleeding, bruising or rash   Neurologic:   Cranial nerves 2 - 12 grossly intact, sensation intact       Results Review:  Lab Results (last 72 hours)     Procedure Component Value Units Date/Time    Potassium [[id number]]  (Abnormal) Collected: 05/27/23 1150    Specimen: Blood Updated: 05/27/23 1240     Potassium 2.9 mmol/L      Comment: Slight hemolysis detected by analyzer. Results may be affected.       Clostridioides difficile Toxin - Stool, Per Rectum [[id number]]  (Normal) Collected: 05/26/23 2314    Specimen: Stool from Per Rectum Updated: 05/27/23 1016    Narrative:      The following orders were created for panel order Clostridioides difficile Toxin - Stool, Per Rectum.  Procedure                               Abnormality         Status                     ---------                               -----------         ------                     Clostridioides difficile...[[id number]]  Normal              Final result                 Please view results for these tests on the individual orders.    Clostridioides difficile EIA - Stool, Per Rectum [[id number]]  (Normal) Collected: 05/26/23 2314    Specimen: Stool from Per Rectum Updated: 05/27/23 1016     C Diff GDH Ag Negative     C.diff Toxin Ag Negative     Narrative:      The result indicates the absence of toxigenic C.difficile from stool specimen.    High Sensitivity Troponin T 2Hr [[id number]]  (Abnormal) Collected: 05/27/23 0629    Specimen: Blood Updated: 05/27/23 0719     HS Troponin T 13 ng/L      Troponin T Delta 5 ng/L     Narrative:      High Sensitive Troponin T Reference Range:  <10.0 ng/L- Negative Female for AMI  <15.0 ng/L- Negative Male for AMI  >=10 - Abnormal Female indicating possible myocardial injury.  >=15 - Abnormal Male indicating possible myocardial injury.   Clinicians would have to utilize clinical acumen, EKG, Troponin, and serial changes to determine if it is an Acute Myocardial Infarction or myocardial injury due to an underlying chronic condition.         High Sensitivity Troponin T [[id number]]  (Normal) Collected: 05/27/23 0347    Specimen: Blood Updated: 05/27/23 0418     HS Troponin T 8 ng/L     Narrative:      High Sensitive Troponin T Reference Range:  <10.0 ng/L- Negative Female for AMI  <15.0 ng/L- Negative Male for AMI  >=10 - Abnormal Female indicating possible myocardial injury.  >=15 - Abnormal Male indicating possible myocardial injury.   Clinicians would have to utilize clinical acumen, EKG, Troponin, and serial changes to determine if it is an Acute Myocardial Infarction or myocardial injury due to an underlying chronic condition.         Potassium [[id number]]  (Abnormal) Collected: 05/27/23 0347    Specimen: Blood Updated: 05/27/23 0413     Potassium 2.8 mmol/L     Gastrointestinal Panel, PCR - Stool, Per Rectum [[id number]]  (Normal) Collected: 05/26/23 2314    Specimen: Stool from Per Rectum Updated: 05/27/23 0034     Campylobacter Not Detected     Plesiomonas shigelloides Not Detected     Salmonella Not Detected     Vibrio Not Detected     Vibrio cholerae Not Detected     Yersinia enterocolitica Not Detected     Enteroaggregative E. coli (EAEC) Not Detected     Enteropathogenic E. coli (EPEC) Not Detected      Enterotoxigenic E. coli (ETEC) lt/st Not Detected     Shiga-like toxin-producing E. coli (STEC) stx1/stx2 Not Detected     Shigella/Enteroinvasive E. coli (EIEC) Not Detected     Cryptosporidium Not Detected     Cyclospora cayetanensis Not Detected     Entamoeba histolytica Not Detected     Giardia lamblia Not Detected     Adenovirus F40/41 Not Detected     Astrovirus Not Detected     Norovirus GI/GII Not Detected     Rotavirus A Not Detected     Sapovirus (I, II, IV or V) Not Detected    Narrative:      If Aeromonas, Staphylococcus aureus or Bacillus cereus are suspected, please order [id number]: Stool Culture, Aeromonas, S aureus, B Cereus.    Manual Differential [[id number]]  (Abnormal) Collected: 05/26/23 2127    Specimen: Blood Updated: 05/26/23 2241     Neutrophil % 52.0 %      Lymphocyte % 2.0 %      Monocyte % 16.0 %      Eosinophil % 1.0 %      Bands %  29.0 %      Neutrophils Absolute 6.48 10*3/mm3      Lymphocytes Absolute 0.16 10*3/mm3      Monocytes Absolute 1.28 10*3/mm3      Eosinophils Absolute 0.08 10*3/mm3      RBC Morphology Normal     Toxic Granulation Slight/1+     Comment: Appended report. These results have been appended to a previously final verified report.        Platelet Morphology Normal    Narrative:      The previously reported component WBC Morphology is no longer being reported. Previous result was Normal (Reference Range: Normal) on 5/26/2023 at 2233 EDT.    Magnesium [[id number]]  (Abnormal) Collected: 05/26/23 2127    Specimen: Blood Updated: 05/26/23 2238     Magnesium 1.4 mg/dL     CBC & Differential [[id number]] Collected: 05/26/23 2127    Specimen: Blood Updated: 05/26/23 2233    Narrative:      The following orders were created for panel order CBC & Differential.  Procedure                               Abnormality         Status                     ---------                               -----------         ------                     CBC Auto Differential[[id number]]        Normal               Final result               Scan Slide[[id number]]                                       Final result                 Please view results for these tests on the individual orders.    Scan Slide [[id number]] Collected: 05/26/23 2127    Specimen: Blood Updated: 05/26/23 2233     Scan Slide --     Comment: See Manual Differential Results       CBC Auto Differential [[id number]]  (Normal) Collected: 05/26/23 2127    Specimen: Blood Updated: 05/26/23 2233     WBC 8.00 10*3/mm3      RBC 4.83 10*6/mm3      Hemoglobin 15.1 g/dL      Hematocrit 44.2 %      MCV 91.6 fL      MCH 31.3 pg      MCHC 34.2 g/dL      RDW 13.1 %      RDW-SD 42.4 fl      MPV 10.2 fL      Platelets 245 10*3/mm3     Narrative:      The previously reported component NRBC is no longer being reported. Previous result was 0.1 /100 WBC (Reference Range: 0.0-0.2 /100 WBC) on 5/26/2023 at 2158 EDT.    Comprehensive Metabolic Panel [[id number]]  (Abnormal) Collected: 05/26/23 2127    Specimen: Blood Updated: 05/26/23 2204     Glucose 152 mg/dL      BUN 11 mg/dL      Creatinine 1.00 mg/dL      Sodium 133 mmol/L      Potassium 2.7 mmol/L      Chloride 100 mmol/L      CO2 17.0 mmol/L      Calcium 9.9 mg/dL      Total Protein 8.0 g/dL      Albumin 4.7 g/dL      ALT (SGPT) 17 U/L      AST (SGOT) 14 U/L      Alkaline Phosphatase 130 U/L      Total Bilirubin 0.8 mg/dL      Globulin 3.3 gm/dL      A/G Ratio 1.4 g/dL      BUN/Creatinine Ratio 11.0     Anion Gap 16.0 mmol/L      eGFR 63.0 mL/min/1.73     Narrative:      GFR Normal >60  Chronic Kidney Disease <60  Kidney Failure <15      Lipase [[id number]]  (Normal) Collected: 05/26/23 2127    Specimen: Blood Updated: 05/26/23 2204     Lipase 17 U/L         Imaging Results (Last 72 Hours)     Procedure Component Value Units Date/Time    CT Abdomen Pelvis With Contrast [[id number]] Collected: 05/26/23 2339     Updated: 05/27/23 0145    Narrative:      EXAMINATION: CT SCAN OF THE ABDOMEN AND PELVIS WITH INTRAVENOUS  CONTRAST    DATE OF EXAM: 5/27/2023 1:14 AM    HISTORY: Nodule, vomiting, dark tarry stools.    COMPARISON: 6/14/2014.    TECHNIQUE: CT examination of the abdomen and pelvis was performed following the intravenous administration of 100 mL  Isovue-370. CT dose lowering techniques were used, to include: automated exposure control, adjustment for patient size, and/or use of   iterative reconstruction.     FINDINGS:    ABDOMEN/PELVIS:    Lower Chest: Normal.     Liver: Normal.     Gallbladder/Biliary: Normal.     Pancreas: Normal.     Spleen: Normal.     Adrenal Glands: Normal.     Kidneys, Ureters, Urinary Bladder: Normal.     GI Tract: There has been prior bowel surgery with at least one left-sided bowel anastomosis. There is a caliber change in small bowel with some dilatation of the small bowel proximally and down in the lower pelvis up to about 4 cm. No abrupt transition   point is able to be seen but there is eventual collapse of the small bowel in the right side of the abdomen. Bowel wall enhances normally. No perforation, abscess or obstruction.    Mesentery/Peritoneum: Normal.    Reproductive: Normal.     Vasculature: Normal.     Lymph Nodes: Normal.     Abdominal Wall: Normal.     Musculoskeletal: Extensive thoracolumbar hardware fixation. Left total hip arthroplasty.        Impression:        1.  Dilatation of the proximal small bowel up to about 4 cm with eventual collapse in the right lower quadrant. No abrupt transition point is identified. Findings indicate at least a high-grade partial small bowel obstruction. Bowel wall enhances   normally. No bowel wall thickening or inflammation. No perforation or abscess.    Electronically signed by:  Jong Carias M.D.    5/26/2023 11:43 PM Mountain Time          I reviewed the patient's new clinical results.  I reviewed the patient's new imaging results and agree with the interpretation.  I reviewed the patient's other test results and agree with the  interpretation      Assessment & Plan       Nausea and vomiting, unspecified vomiting type      62-year-old lady with a history of prior bowel surgery who presented with a few days of vomiting preceded by weeks of diarrhea.  CT scan showed proximal dilated small bowel, distal decompressed small bowel    Currently she is comfortable without any abdominal discomfort and does not complain of nausea.  We will get a small bowel follow-through to assess further for obstruction.    I discussed the patient's findings and my recommendations with patient              This document has been electronically signed by Mikey Pritchett MD on May 27, 2023 13:19 EDT      Mikey Pritchett MD  05/27/23  13:19 EDT

## 2023-05-27 NOTE — PLAN OF CARE
Goal Outcome Evaluation:  Plan of Care Reviewed With: patient        Progress: no change  Outcome Evaluation: Just arrived from the ER with a partial small bowel obstruction. NPO. IVF infusing. Magnesium and Potassium low, replacing. General Surgery consult called.

## 2023-05-28 LAB
ANION GAP SERPL CALCULATED.3IONS-SCNC: 10 MMOL/L (ref 5–15)
BASOPHILS # BLD AUTO: 0 10*3/MM3 (ref 0–0.2)
BASOPHILS NFR BLD AUTO: 0.5 % (ref 0–1.5)
BUN SERPL-MCNC: 13 MG/DL (ref 8–23)
BUN/CREAT SERPL: 13.1 (ref 7–25)
CALCIUM SPEC-SCNC: 9.5 MG/DL (ref 8.6–10.5)
CHLORIDE SERPL-SCNC: 105 MMOL/L (ref 98–107)
CO2 SERPL-SCNC: 19 MMOL/L (ref 22–29)
CREAT SERPL-MCNC: 0.99 MG/DL (ref 0.57–1)
DEPRECATED RDW RBC AUTO: 43.8 FL (ref 37–54)
EGFRCR SERPLBLD CKD-EPI 2021: 63.8 ML/MIN/1.73
EOSINOPHIL # BLD AUTO: 0.2 10*3/MM3 (ref 0–0.4)
EOSINOPHIL NFR BLD AUTO: 2.8 % (ref 0.3–6.2)
ERYTHROCYTE [DISTWIDTH] IN BLOOD BY AUTOMATED COUNT: 13 % (ref 12.3–15.4)
GLUCOSE SERPL-MCNC: 141 MG/DL (ref 65–99)
HCT VFR BLD AUTO: 37.4 % (ref 34–46.6)
HGB BLD-MCNC: 13.4 G/DL (ref 12–15.9)
LYMPHOCYTES # BLD AUTO: 1.3 10*3/MM3 (ref 0.7–3.1)
LYMPHOCYTES NFR BLD AUTO: 17.6 % (ref 19.6–45.3)
MAGNESIUM SERPL-MCNC: 2.5 MG/DL (ref 1.6–2.4)
MCH RBC QN AUTO: 32.9 PG (ref 26.6–33)
MCHC RBC AUTO-ENTMCNC: 35.9 G/DL (ref 31.5–35.7)
MCV RBC AUTO: 91.5 FL (ref 79–97)
MONOCYTES # BLD AUTO: 1.2 10*3/MM3 (ref 0.1–0.9)
MONOCYTES NFR BLD AUTO: 16.5 % (ref 5–12)
NEUTROPHILS NFR BLD AUTO: 4.5 10*3/MM3 (ref 1.7–7)
NEUTROPHILS NFR BLD AUTO: 62.6 % (ref 42.7–76)
NRBC BLD AUTO-RTO: 0.2 /100 WBC (ref 0–0.2)
PLATELET # BLD AUTO: 213 10*3/MM3 (ref 140–450)
PMV BLD AUTO: 10.3 FL (ref 6–12)
POTASSIUM SERPL-SCNC: 4 MMOL/L (ref 3.5–5.2)
RBC # BLD AUTO: 4.09 10*6/MM3 (ref 3.77–5.28)
SODIUM SERPL-SCNC: 134 MMOL/L (ref 136–145)
WBC NRBC COR # BLD: 7.1 10*3/MM3 (ref 3.4–10.8)

## 2023-05-28 PROCEDURE — 96372 THER/PROPH/DIAG INJ SC/IM: CPT

## 2023-05-28 PROCEDURE — 25010000002 ENOXAPARIN PER 10 MG: Performed by: INTERNAL MEDICINE

## 2023-05-28 PROCEDURE — 25010000002 PIPERACILLIN SOD-TAZOBACTAM PER 1 G: Performed by: NURSE PRACTITIONER

## 2023-05-28 PROCEDURE — 99232 SBSQ HOSP IP/OBS MODERATE 35: CPT | Performed by: SURGERY

## 2023-05-28 PROCEDURE — 97162 PT EVAL MOD COMPLEX 30 MIN: CPT

## 2023-05-28 PROCEDURE — 80048 BASIC METABOLIC PNL TOTAL CA: CPT | Performed by: INTERNAL MEDICINE

## 2023-05-28 PROCEDURE — 96376 TX/PRO/DX INJ SAME DRUG ADON: CPT

## 2023-05-28 PROCEDURE — G0378 HOSPITAL OBSERVATION PER HR: HCPCS

## 2023-05-28 PROCEDURE — 85025 COMPLETE CBC W/AUTO DIFF WBC: CPT | Performed by: INTERNAL MEDICINE

## 2023-05-28 PROCEDURE — 83735 ASSAY OF MAGNESIUM: CPT | Performed by: INTERNAL MEDICINE

## 2023-05-28 RX ORDER — FLUOXETINE HYDROCHLORIDE 20 MG/1
40 CAPSULE ORAL DAILY
Status: DISCONTINUED | OUTPATIENT
Start: 2023-05-28 | End: 2023-05-29 | Stop reason: HOSPADM

## 2023-05-28 RX ORDER — LEVOTHYROXINE SODIUM 0.12 MG/1
125 TABLET ORAL
Status: DISCONTINUED | OUTPATIENT
Start: 2023-05-29 | End: 2023-05-29 | Stop reason: HOSPADM

## 2023-05-28 RX ORDER — ATORVASTATIN CALCIUM 40 MG/1
80 TABLET, FILM COATED ORAL NIGHTLY
Status: DISCONTINUED | OUTPATIENT
Start: 2023-05-28 | End: 2023-05-29 | Stop reason: HOSPADM

## 2023-05-28 RX ORDER — BUPROPION HYDROCHLORIDE 150 MG/1
300 TABLET ORAL DAILY
Status: DISCONTINUED | OUTPATIENT
Start: 2023-05-28 | End: 2023-05-29 | Stop reason: HOSPADM

## 2023-05-28 RX ORDER — ENOXAPARIN SODIUM 100 MG/ML
40 INJECTION SUBCUTANEOUS NIGHTLY
Status: DISCONTINUED | OUTPATIENT
Start: 2023-05-28 | End: 2023-05-29 | Stop reason: HOSPADM

## 2023-05-28 RX ADMIN — OXYCODONE 10 MG: 5 TABLET ORAL at 05:26

## 2023-05-28 RX ADMIN — PIPERACILLIN AND TAZOBACTAM 3.38 G: 3; .375 INJECTION, POWDER, LYOPHILIZED, FOR SOLUTION INTRAVENOUS at 17:35

## 2023-05-28 RX ADMIN — Medication 10 ML: at 08:23

## 2023-05-28 RX ADMIN — Medication 5 MG: at 20:39

## 2023-05-28 RX ADMIN — ENOXAPARIN SODIUM 40 MG: 100 INJECTION SUBCUTANEOUS at 17:35

## 2023-05-28 RX ADMIN — OXYCODONE 10 MG: 5 TABLET ORAL at 16:51

## 2023-05-28 RX ADMIN — PIPERACILLIN AND TAZOBACTAM 3.38 G: 3; .375 INJECTION, POWDER, LYOPHILIZED, FOR SOLUTION INTRAVENOUS at 08:22

## 2023-05-28 RX ADMIN — Medication 10 ML: at 20:39

## 2023-05-28 RX ADMIN — FLUOXETINE 40 MG: 20 CAPSULE ORAL at 17:35

## 2023-05-28 RX ADMIN — ATORVASTATIN CALCIUM 80 MG: 40 TABLET, FILM COATED ORAL at 20:39

## 2023-05-28 RX ADMIN — OXYCODONE 10 MG: 5 TABLET ORAL at 09:53

## 2023-05-28 RX ADMIN — PANTOPRAZOLE SODIUM 40 MG: 40 INJECTION, POWDER, LYOPHILIZED, FOR SOLUTION INTRAVENOUS at 05:26

## 2023-05-28 RX ADMIN — OXYCODONE 10 MG: 5 TABLET ORAL at 20:39

## 2023-05-28 NOTE — PROGRESS NOTES
[facility] Medicine Services   Daily Progress Note      Patient Name: Kavita Garcia  : 1960  MRN: [id number]  Primary Care Physician:  Provider, No Known  Date of admission: 2023      Subjective      Chief Complaint: Abdominal pain    Patient seen and examined this morning.  Feeling much better compared to yesterday.  Nausea and vomiting has almost resolved.  Still having significant diarrhea with watery/loose stools overnight and yesterday.  Denies any melena or hematochezia.  Small bowel follow-through results discussed with her, awaiting general surgery evaluation today.    Pertinent positives as noted in HPI/subjective.  All other systems were reviewed and are negative.      Objective      Vitals:   Temp:  [97.2 °F (36.2 °C)-98.1 °F (36.7 °C)] 97.6 °F (36.4 °C)  Heart Rate:  [60-73] 65  Resp:  [12-19] 13  BP: (102-125)/(64-72) 125/68    Physical Exam:    General: Awake, alert, elderly female, lying in bed, NAD  Eyes: PERRL, EOMI, conjunctivae are clear  Cardiovascular: Regular rate and rhythm, no murmurs  Respiratory: Clear to auscultation bilaterally, no wheezing or rales, unlabored breathing  Abdomen: Soft, mild generalized tenderness noted, positive bowel sounds, no guarding  Neurologic: A&O, CN grossly intact, moves all extremities spontaneously  Musculoskeletal: Normal range of motion, no other gross deformities  Skin: Warm, dry, intact         Result Review    Result Review:  I have personally reviewed the results from the time of this admission to 2023 09:59 EDT and agree with these findings:  [x]  Laboratory  [x]  Microbiology  [x]  Radiology  []  EKG/Telemetry   []  Cardiology/Vascular   []  Pathology  []  Old records  []  Other:          Assessment & Plan      Brief Patient Summary:  Kavita Garcia is a 62 y.o. female with past medical history of osteoarthritis, osteoporosis, degenerative disc disease, lumbar radiculopathy, neuropathy, history of colon perforation in  2012, hypertension, hyperlipidemia, hypothyroidism, anxiety and depression who presented to [facility] on 5/26/2023 complaining of abdominal pain nausea vomiting and diarrhea. CT of the abdomen showed dilation of the proximal small bowel loops up to 4 cm with eventual collapse in the right lower quadrant.  General surgery consulted.  Small bowel follow-through positive for partial SBO.      pantoprazole, 40 mg, Intravenous, Q AM  piperacillin-tazobactam, 3.375 g, Intravenous, Q8H  sodium chloride, 10 mL, Intravenous, Q12H       lactated ringers, 75 mL/hr, Last Rate: 75 mL/hr (05/27/23 1006)         I have utilized all available, immediate resources to obtain, update, or review the patient's current medications including all prescriptions, over-the-counter products, herbals, cannabis/cannabidiol products, and vitamin.mineral/dietary (nutritional) supplements.    Active Hospital Problems:  Active Hospital Problems    Diagnosis    • **Nausea and vomiting, unspecified vomiting type      Plan:     Partial SBO  -CT and small bowel follow-through results noted  -Keep n.p.o. for now, general surgery following  -IV fluids, supportive care  -Empiric Zosyn for now    Hypokalemia  Hypomagnesemia  -Replace and monitor    Chronic pain secondary to lumbar radiculopathy, osteoarthritis  -Resume p.o. meds when able to tolerate diet  -Continue pain control as above otherwise    Hypothyroidism  -Resume levothyroxine when able    DVT prophylaxis  -Lovenox      CODE STATUS:    Code Status (Patient has no pulse and is not breathing): CPR (Attempt to Resuscitate)  Medical Interventions (Patient has pulse or is breathing): Full Support      Disposition: Pending improvement    Electronically signed by Ashanti Ramirez DO, 05/28/23, 09:59 EDT.  [facility] Hospitalist Team      Part of this note may be an electronic transcription/translation of spoken language to printed text using the Dragon Dictation System.

## 2023-05-28 NOTE — NURSING NOTE
Patient had a home bottle of Oxycodone brought in from home by her spouse today. I found it and sent it down to pharmacy and put a label in her chart.

## 2023-05-28 NOTE — PLAN OF CARE
Goal Outcome Evaluation:  Plan of Care Reviewed With: patient        Progress: no change  Outcome Evaluation: patient resting in bed throughout shift. complaints of pain treated per mar. potassium resulted WNL. no further complaints. diet changed to clear liquids, with advance as tolerated. home meds reordered.

## 2023-05-28 NOTE — PLAN OF CARE
Goal Outcome Evaluation:  Plan of Care Reviewed With: patient        Progress: no change  Outcome Evaluation: Patient rested well over night. Did c/o pain at the start of the shift, treated per MAR. Potassium replaced. Waiting for redraw this morning.

## 2023-05-28 NOTE — PROGRESS NOTES
GENERAL SURGERY PROGRESS NOTE  Chief Complaint:  Surgery Follow up   LOS: 0 days       Subjective     Interval History:     Feels well.  Continues to have loose bowel movements.  No nausea or vomiting.    Objective     Vital Signs  Temp:  [97.2 °F (36.2 °C)-97.7 °F (36.5 °C)] 97.6 °F (36.4 °C)  Heart Rate:  [60-73] 60  Resp:  [12-17] 14  BP: (103-125)/(64-72) 112/65    Physical Exam:   Abdomen soft, nondistended  Labs:  Lab Results (last 24 hours)     Procedure Component Value Units Date/Time    Magnesium [[id number]]  (Abnormal) Collected: 05/28/23 0635    Specimen: Blood Updated: 05/28/23 0734     Magnesium 2.5 mg/dL     Basic Metabolic Panel [[id number]]  (Abnormal) Collected: 05/28/23 0635    Specimen: Blood Updated: 05/28/23 0734     Glucose 141 mg/dL      BUN 13 mg/dL      Creatinine 0.99 mg/dL      Sodium 134 mmol/L      Potassium 4.0 mmol/L      Chloride 105 mmol/L      CO2 19.0 mmol/L      Calcium 9.5 mg/dL      BUN/Creatinine Ratio 13.1     Anion Gap 10.0 mmol/L      eGFR 63.8 mL/min/1.73     Narrative:      GFR Normal >60  Chronic Kidney Disease <60  Kidney Failure <15      CBC & Differential [[id number]]  (Abnormal) Collected: 05/28/23 0635    Specimen: Blood Updated: 05/28/23 0659    Narrative:      The following orders were created for panel order CBC & Differential.  Procedure                               Abnormality         Status                     ---------                               -----------         ------                     CBC Auto Differential[[id number]]        Abnormal            Final result                 Please view results for these tests on the individual orders.    CBC Auto Differential [[id number]]  (Abnormal) Collected: 05/28/23 0635    Specimen: Blood Updated: 05/28/23 0659     WBC 7.10 10*3/mm3      RBC 4.09 10*6/mm3      Hemoglobin 13.4 g/dL      Hematocrit 37.4 %      MCV 91.5 fL      MCH 32.9 pg      MCHC 35.9 g/dL      RDW 13.0 %      RDW-SD 43.8 fl      MPV 10.3 fL       Platelets 213 10*3/mm3      Neutrophil % 62.6 %      Lymphocyte % 17.6 %      Monocyte % 16.5 %      Eosinophil % 2.8 %      Basophil % 0.5 %      Neutrophils, Absolute 4.50 10*3/mm3      Lymphocytes, Absolute 1.30 10*3/mm3      Monocytes, Absolute 1.20 10*3/mm3      Eosinophils, Absolute 0.20 10*3/mm3      Basophils, Absolute 0.00 10*3/mm3      nRBC 0.2 /100 WBC            Results Review:     Labs and imaging for today were reviewed.    Assessment & Plan     Kavita Garcia is a 62 y.o. female who has resolving partial small bowel obstruction      We will allow clears today and advance diet as tolerated.  Work-up for diarrhea negative thus far.          This document has been electronically signed by Mikey Pritchett MD on May 28, 2023 15:25 EDT        Mikey Pritchett MD  05/28/23  15:25 EDT

## 2023-05-29 VITALS
BODY MASS INDEX: 23.58 KG/M2 | DIASTOLIC BLOOD PRESSURE: 66 MMHG | RESPIRATION RATE: 14 BRPM | SYSTOLIC BLOOD PRESSURE: 115 MMHG | OXYGEN SATURATION: 92 % | WEIGHT: 141.54 LBS | HEIGHT: 65 IN | TEMPERATURE: 98.4 F | HEART RATE: 61 BPM

## 2023-05-29 PROCEDURE — G0378 HOSPITAL OBSERVATION PER HR: HCPCS

## 2023-05-29 PROCEDURE — 96376 TX/PRO/DX INJ SAME DRUG ADON: CPT

## 2023-05-29 PROCEDURE — 99232 SBSQ HOSP IP/OBS MODERATE 35: CPT | Performed by: SURGERY

## 2023-05-29 RX ORDER — ONDANSETRON 4 MG/1
4 TABLET, FILM COATED ORAL EVERY 8 HOURS PRN
Qty: 15 TABLET | Refills: 0 | Status: SHIPPED | OUTPATIENT
Start: 2023-05-29

## 2023-05-29 RX ADMIN — LEVOTHYROXINE SODIUM 125 MCG: 0.12 TABLET ORAL at 05:12

## 2023-05-29 RX ADMIN — PANTOPRAZOLE SODIUM 40 MG: 40 INJECTION, POWDER, LYOPHILIZED, FOR SOLUTION INTRAVENOUS at 05:12

## 2023-05-29 RX ADMIN — OXYCODONE 10 MG: 5 TABLET ORAL at 05:12

## 2023-05-29 RX ADMIN — Medication 10 ML: at 08:08

## 2023-05-29 RX ADMIN — FLUOXETINE 40 MG: 20 CAPSULE ORAL at 08:08

## 2023-05-29 RX ADMIN — OXYCODONE 10 MG: 5 TABLET ORAL at 09:52

## 2023-05-29 RX ADMIN — BUPROPION HYDROCHLORIDE 300 MG: 150 TABLET, EXTENDED RELEASE ORAL at 08:07

## 2023-05-29 NOTE — DISCHARGE SUMMARY
[facility] Medicine Services   DISCHARGE SUMMARY    Patient Name: Kavita Garcia  : 1960  MRN: [id number]    Date of Admission: 2023  Date of Discharge:  23    Primary Care Physician: Provider, No Known      Presenting Problem:   Hypokalemia [E87.6]  Hypomagnesemia [E83.42]  Small bowel obstruction [K56.609]  Generalized abdominal pain [R10.84]  Abnormal EKG [R94.31]  Diarrhea, unspecified type [R19.7]  Nausea and vomiting, unspecified vomiting type [R11.2]    Active and Resolved Hospital Problems:  Active Hospital Problems    Diagnosis POA   • **Nausea and vomiting, unspecified vomiting type [R11.2] Yes      Resolved Hospital Problems   No resolved problems to display.         Hospital Course     Hospital Course:  Kavita Garcia is a 62 y.o. female with past medical history of osteoarthritis, osteoporosis, degenerative disc disease, lumbar radiculopathy, neuropathy, history of colon perforation in , hypertension, hyperlipidemia, hypothyroidism, anxiety and depression who presented to [facility] on 2023 complaining of abdominal pain nausea vomiting and diarrhea. CT of the abdomen showed dilation of the proximal small bowel loops up to 4 cm with eventual collapse in the right lower quadrant.  General surgery consulted.  Small bowel follow-through positive for partial SBO.  Patient continued to have improvement, diarrhea has improved.  Slowly advance her diet and tolerating it well.  Antibiotics discontinued.  GI panel and C. difficile testing negative.  No further inpatient work-up required.  Patient is medically stable to discharge home today with follow-up with PCP and general surgery as an outpatient.    A/P:    Partial SBO, resolved  -CT and small bowel follow-through results noted  -Improved, diet advanced and tolerating  -Off antibiotics  -Okay to discharge with outpatient follow-up with general surgery    Hypokalemia  Hypomagnesemia  -Replace and  monitor     Chronic pain secondary to lumbar radiculopathy, osteoarthritis  -Continue home meds     Hypothyroidism  -Continue home meds        DISCHARGE Follow Up Recommendations for labs and diagnostics:   Follow-up with PCP and general surgery    Reasons For Change In Medications and Indications for New Medications:  Zofran as needed added    Day of Discharge     Vital Signs:  Temp:  [97.4 °F (36.3 °C)-98.3 °F (36.8 °C)] 97.4 °F (36.3 °C)  Heart Rate:  [58-68] 64  Resp:  [14-19] 19  BP: (112-126)/(62-74) 126/65    Physical Exam:  General: Awake, alert, NAD  Eyes: PERRL, EOMI, conjunctivae are clear  Cardiovascular: Regular rate and rhythm, no murmurs  Respiratory: Clear to auscultation bilaterally, no wheezing or rales, unlabored breathing  Abdomen: Soft, nontender, positive bowel sounds, no guarding  Neurologic: A&O, CN grossly intact, moves all extremities spontaneously  Musculoskeletal: Normal range of motion, no other gross deformities  Skin: Warm, dry, intact        Pertinent  and/or Most Recent Results     LAB RESULTS:      Lab 05/28/23  0635 05/26/23 2127   WBC 7.10 8.00   HEMOGLOBIN 13.4 15.1   HEMATOCRIT 37.4 44.2   PLATELETS 213 245   NEUTROS ABS 4.50 6.48   LYMPHS ABS 1.30  --    MONOS ABS 1.20*  --    EOS ABS 0.20 0.08   MCV 91.5 91.6         Lab 05/28/23  0635 05/27/23  1150 05/27/23  0347 05/26/23 2127   SODIUM 134*  --   --  133*   POTASSIUM 4.0 2.9* 2.8* 2.7*   CHLORIDE 105  --   --  100   CO2 19.0*  --   --  17.0*   ANION GAP 10.0  --   --  16.0*   BUN 13  --   --  11   CREATININE 0.99  --   --  1.00   EGFR 63.8  --   --  63.0   GLUCOSE 141*  --   --  152*   CALCIUM 9.5  --   --  9.9   MAGNESIUM 2.5*  --   --  1.4*         Lab 05/26/23 2127   TOTAL PROTEIN 8.0   ALBUMIN 4.7   GLOBULIN 3.3   ALT (SGPT) 17   AST (SGOT) 14   BILIRUBIN 0.8   ALK PHOS 130*   LIPASE 17         Lab 05/27/23  0629 05/27/23  0347   HSTROP T 13* 8                 Brief Urine Lab Results     None        Microbiology  Results (last 10 days)     Procedure Component Value - Date/Time    Gastrointestinal Panel, PCR - Stool, Per Rectum [[id number]]  (Normal) Collected: 05/26/23 2314    Lab Status: Final result Specimen: Stool from Per Rectum Updated: 05/27/23 0034     Campylobacter Not Detected     Plesiomonas shigelloides Not Detected     Salmonella Not Detected     Vibrio Not Detected     Vibrio cholerae Not Detected     Yersinia enterocolitica Not Detected     Enteroaggregative E. coli (EAEC) Not Detected     Enteropathogenic E. coli (EPEC) Not Detected     Enterotoxigenic E. coli (ETEC) lt/st Not Detected     Shiga-like toxin-producing E. coli (STEC) stx1/stx2 Not Detected     Shigella/Enteroinvasive E. coli (EIEC) Not Detected     Cryptosporidium Not Detected     Cyclospora cayetanensis Not Detected     Entamoeba histolytica Not Detected     Giardia lamblia Not Detected     Adenovirus F40/41 Not Detected     Astrovirus Not Detected     Norovirus GI/GII Not Detected     Rotavirus A Not Detected     Sapovirus (I, II, IV or V) Not Detected    Narrative:      If Aeromonas, Staphylococcus aureus or Bacillus cereus are suspected, please order [id number]: Stool Culture, Aeromonas, S aureus, B Cereus.    Clostridioides difficile Toxin - Stool, Per Rectum [[id number]]  (Normal) Collected: 05/26/23 2314    Lab Status: Final result Specimen: Stool from Per Rectum Updated: 05/27/23 1016    Narrative:      The following orders were created for panel order Clostridioides difficile Toxin - Stool, Per Rectum.  Procedure                               Abnormality         Status                     ---------                               -----------         ------                     Clostridioides difficile...[[id number]]  Normal              Final result                 Please view results for these tests on the individual orders.    Clostridioides difficile EIA - Stool, Per Rectum [[id number]]  (Normal) Collected: 05/26/23 2314    Lab Status: Final  result Specimen: Stool from Per Rectum Updated: 05/27/23 1016     C Diff GDH Ag Negative     C.diff Toxin Ag Negative    Narrative:      The result indicates the absence of toxigenic C.difficile from stool specimen.          CT Abdomen Pelvis With Contrast    Result Date: 5/27/2023  Impression: 1.  Dilatation of the proximal small bowel up to about 4 cm with eventual collapse in the right lower quadrant. No abrupt transition point is identified. Findings indicate at least a high-grade partial small bowel obstruction. Bowel wall enhances normally. No bowel wall thickening or inflammation. No perforation or abscess. Electronically signed by:  Jong Carias M.D.  5/26/2023 11:43 PM [facility] Small Bowel Follow Through Single-Contrast    Result Date: 5/27/2023  Impression: Impression: Findings are compatible with a partial small bowel obstruction. There is abnormal morphology of the colon which is entirely in the left abdomen which may be developmental although on the comparison CT scan there appears to have been previous colon surgery as well Electronically Signed: Ministerio Mcclain  5/27/2023 10:32 PM EDT  Workstation ID: [id number]                  Labs Pending at Discharge:      Procedures Performed           Consults:   Consults     Date and Time Order Name Status Description    5/27/2023  3:00 AM IP Consult to General Surgery Completed             Discharge Details        Discharge Medications      New Medications      Instructions Start Date   ondansetron 4 MG tablet  Commonly known as: Zofran   4 mg, Oral, Every 8 Hours PRN         Continue These Medications      Instructions Start Date   atorvastatin 80 MG tablet  Commonly known as: LIPITOR   80 mg, Oral, Nightly      buPROPion  MG 24 hr tablet  Commonly known as: WELLBUTRIN XL   300 mg, Oral, Daily      levothyroxine 125 MCG tablet  Commonly known as: SYNTHROID, LEVOTHROID   125 mcg, Oral, Daily      oxyCODONE 10 MG tablet  Commonly known as:  ROXICODONE   10 mg, Oral, 4 Times Daily PRN      zolpidem 10 MG tablet  Commonly known as: AMBIEN   10 mg, Oral, Nightly PRN         ASK your doctor about these medications      Instructions Start Date   famotidine 40 MG tablet  Commonly known as: PEPCID   40 mg, Oral, Daily      FLUoxetine 20 MG capsule  Commonly known as: PROzac   20 mg, Oral, Daily             Allergies   Allergen Reactions   • Duloxetine Hcl Itching   • Erythromycin Hives   • Morphine Hives   • Sulfa Antibiotics Hives   • Trazodone Other (See Comments)     NIGHTMARES         Discharge Disposition:  Home or Self Care    Diet:  Hospital:  Diet Order   Procedures   • Diet: Gastrointestinal Diets; Fiber-Restricted; Texture: Regular Texture (IDDSI 7); Fluid Consistency: Thin (IDDSI 0)         Discharge Activity:         CODE STATUS:  Code Status and Medical Interventions:   Ordered at: 05/27/23 0307     Code Status (Patient has no pulse and is not breathing):    CPR (Attempt to Resuscitate)     Medical Interventions (Patient has pulse or is breathing):    Full Support         No future appointments.    Additional Instructions for the Follow-ups that You Need to Schedule     Ambulatory Referral to Home Health (Hospital)   As directed      Face to Face Visit Date: 5/29/2023    Follow-up provider for Plan of Care?: I treated the patient in an acute care facility and will not continue treatment after discharge.    Follow-up provider: KRISTYN MILLAN [[id number]]    Reason/Clinical Findings: PT/OT eval and treat    Describe mobility limitations that make leaving home difficult: PT/OT eval and treat    Nursing/Therapeutic Services Requested: Physical Therapy Occupational Therapy    Frequency: 1 Week 1               Time spent on Discharge including face to face service:  35 minutes    Signature:    Electronically signed by Ashanti Ramirez DO, 05/29/23, 9:37 AM EDT.      Part of this note may be an electronic transcription/translation of spoken language to printed  text using the Dragon Dictation System.

## 2023-05-29 NOTE — PLAN OF CARE
Goal Outcome Evaluation:  Plan of Care Reviewed With: patient        Progress: improving  Outcome Evaluation: Patient has been resting well over night. Did c/o pain at the start of the shift, gave PRN Oxycodone. Tolerating clear liquid diet well.

## 2023-05-29 NOTE — PLAN OF CARE
Goal Outcome Evaluation:  Plan of Care Reviewed With: patient, family        Progress: improving  Outcome Evaluation: Patient 61 yo female admitted to the hospital with the complaint of Abdominal pain, Nausea and vomiting. Recent Dx includes high grade partial small bowel obstruction, Hypokalemia and hypomagnesia. At baseline, patient resides with spouse in a house and is independent in mobility using a cane. Her spouse assist in ADLs and provides transportation when needed. As per today's evaluation, patient is MI in Bed Mobility, required SBA and RW for transfers and ambulating 30ft. PT recommend HHPT at d/c.

## 2023-05-29 NOTE — PROGRESS NOTES
GENERAL SURGERY PROGRESS NOTE  Chief Complaint:  Surgery Follow up   LOS: 0 days       Subjective     Interval History:     She feels significantly better today.  Has tolerated liquids.  States that her diarrhea has improved.  She has no nausea or vomiting.  Is anxious to go home.    Objective     Vital Signs  Temp:  [97.4 °F (36.3 °C)-98.3 °F (36.8 °C)] 97.4 °F (36.3 °C)  Heart Rate:  [58-68] 64  Resp:  [14-19] 19  BP: (112-126)/(62-74) 126/65    Physical Exam:   Abdomen soft, nontender  Labs:  Lab Results (last 24 hours)     ** No results found for the last 24 hours. **           Results Review:     Labs and imaging for today were reviewed.    Assessment & Plan     Kavita Garcia is a 62 y.o. female who has resolved partial small bowel obstruction      Overall appears to be doing well.  Should be suitable for discharge home today.          This document has been electronically signed by Mikey Pritchett MD on May 29, 2023 10:58 EDT        Mikey Pritchett MD  05/29/23  10:58 EDT

## 2023-05-29 NOTE — THERAPY EVALUATION
Patient Name: Kavita Garcia  : 1960    MRN: [id number]                              Today's Date: 2023       Admit Date: 2023    Visit Dx:     ICD-10-CM ICD-9-CM   1. Nausea and vomiting, unspecified vomiting type  R11.2 787.01   2. Diarrhea, unspecified type  R19.7 787.91   3. Generalized abdominal pain  R10.84 789.07   4. Hypokalemia  E87.6 276.8   5. Hypomagnesemia  E83.42 275.2   6. Small bowel obstruction  K56.609 560.9   7. Abnormal EKG  R94.31 794.31     Patient Active Problem List   Diagnosis   • Arthritis   • Chronic pain   • Degeneration of intervertebral disc of lumbar region   • Degeneration of intervertebral disc of thoracic region   • Depression   • Fibromyositis   • Hyperlipidemia   • Hypertension   • Hypothyroidism   • Other long term (current) drug therapy   • Postlaminectomy syndrome, not elsewhere classified   • Abdominal pain   • Breakdown (mechanical) of int fix of vertebrae, init   • Cervical spinal stenosis   • Lumbar stenosis   • Closed wedge compression fracture of T10 vertebra   • Displacement of internal fixation device of vertebrae   • Ileus, postoperative   • Kyphosis of thoracolumbar region   • Lumbar radiculopathy   • Lumbar scoliosis   • Neuropathy   • Osteoporosis   • Ankylosis of spine   • Pseudarthrosis following spinal fusion   • Pseudoarthrosis of spine   • S/P lumbar fusion   • Acquired postural kyphosis   • Secondary kyphosis deformity of spine   • Status post left hip replacement   • Tobacco use   • DDD (degenerative disc disease)   • Anxiety and depression   • Nausea and vomiting, unspecified vomiting type     Past Medical History:   Diagnosis Date   • Arthritis    • Colon perforation    • Depression    • GERD    • Hypothyroidism    • MAMMO    • Neuropathy     Hands/ Feet   • PAP    • Scoliosis    • Stomach ulcer      Past Surgical History:   Procedure Laterality Date   • BACK SURGERY  [id number]    6 surgeries   • COLON SURGERY      Colon  Resection   • COLONOSCOPY        General Information     Row Name 05/28/23 2108          Physical Therapy Time and Intention    Document Type evaluation  -PG     Mode of Treatment physical therapy  -PG     Row Name 05/28/23 2108          General Information    Patient Profile Reviewed yes  -PG     Prior Level of Function --  Ind in mobility using a cane and sometimes RW.  -PG     Existing Precautions/Restrictions fall  -PG     Barriers to Rehab cognitive status  -PG     Row Name 05/28/23 2108          Living Environment    People in Home spouse  -PG     Row Name 05/28/23 2108          Home Main Entrance    Number of Stairs, Main Entrance two  -PG     Row Name 05/28/23 2108          Cognition    Orientation Status (Cognition) oriented x 4  -PG     Row Name 05/28/23 2108          Safety Issues, Functional Mobility    Safety Issues Affecting Function (Mobility) safety precaution awareness  -PG     Impairments Affecting Function (Mobility) balance;postural/trunk control;endurance/activity tolerance;strength  -PG           User Key  (r) = Recorded By, (t) = Taken By, (c) = Cosigned By    Initials Name Provider Type    PG Debra Mckeon PT Physical Therapist               Mobility     Row Name 05/28/23 2109          Bed Mobility    Bed Mobility bed mobility (all) activities  -PG     All Activities, [place] (Bed Mobility) modified independence  -PG     Row Name 05/28/23 2109          Sit-Stand Transfer    Sit-Stand [place] (Transfers) standby assist  -PG     Assistive Device (Sit-Stand Transfers) walker, front-wheeled  -PG     Row Name 05/28/23 2109          Gait/Stairs (Locomotion)    [place] Level (Gait) standby assist  -PG     Assistive Device (Gait) walker, front-wheeled  -PG     Distance in Feet (Gait) 30ft  -PG           User Key  (r) = Recorded By, (t) = Taken By, (c) = Cosigned By    Initials Name Provider Type    Debra Love PT Physical Therapist               Obj/Interventions     Row  Name 05/28/23 2109          Range of Motion Comprehensive    General Range of Motion upper extremity range of motion deficits identified;bilateral lower extremity ROM WFL  -PG     Row Name 05/28/23 2109          Strength Comprehensive (MMT)    General Manual Muscle Testing (MMT) Assessment lower extremity strength deficits identified;upper extremity strength deficits identified  -PG     Row Name 05/28/23 2109          Balance    Balance Assessment sitting static balance;sit to stand dynamic balance;sitting dynamic balance;standing dynamic balance  -PG     Static Sitting Balance modified independence  -PG     Dynamic Sitting Balance modified independence  -PG     Position, Sitting Balance sitting edge of bed;unsupported  -PG     Sit to Stand Dynamic Balance standby assist  -PG     Dynamic Standing Balance standby assist  -PG     Position/Device Used, Standing Balance walker, rolling  -PG           User Key  (r) = Recorded By, (t) = Taken By, (c) = Cosigned By    Initials Name Provider Type    PG Debra Mckeon, PT Physical Therapist               Goals/Plan     Row Name 05/28/23 2115          Transfer Goal 1 (PT)    Activity/Assistive Device (Transfer Goal 1, PT) transfers, all  -PG     [place] Level/Cues Needed (Transfer Goal 1, PT) modified independence  -PG     Time Frame (Transfer Goal 1, PT) long term goal (LTG);2 weeks  -PG     Row Name 05/28/23 2115          Gait Training Goal 1 (PT)    Activity/Assistive Device (Gait Training Goal 1, PT) gait (walking locomotion)  -PG     [place] Level (Gait Training Goal 1, PT) modified independence  -PG     Distance (Gait Training Goal 1, PT) [id number]  -PG     Time Frame (Gait Training Goal 1, PT) long term goal (LTG);2 weeks  -PG     Row Name 05/28/23 2115          Stairs Goal 1 (PT)    Activity/Assistive Device (Stairs Goal 1, PT) stairs, all skills  -PG     [place] Level/Cues Needed (Stairs Goal 1, PT) modified independence  -PG     Number of Stairs  (Stairs Goal 1, PT) 2  -PG     Time Frame (Stairs Goal 1, PT) long term goal (LTG);2 weeks  -PG     Row Name 05/28/23 2115          Therapy Assessment/Plan (PT)    Planned Therapy Interventions (PT) balance training;bed mobility training;gait training;home exercise program;neuromuscular re-education;patient/family education;postural re-education;transfer training;stretching;strengthening;stair training;ROM (range of motion)  -PG           User Key  (r) = Recorded By, (t) = Taken By, (c) = Cosigned By    Initials Name Provider Type    PG Debra Mckeon, PT Physical Therapist               Clinical Impression     Row Name 05/28/23 2110          Pain    Pretreatment Pain Rating 2/10  -PG     Posttreatment Pain Rating 2/10  -PG     Pain Location - abdomen  -PG     Row Name 05/28/23 2110          Plan of Care Review    Plan of Care Reviewed With patient;family  -PG     Progress improving  -PG     Outcome Evaluation Patient 63 yo female admitted to the hospital with the complaint of Abdominal pain, Nausea and vomiting. Recent Dx includes high grade partial small bowel obstruction, Hypokalemia and hypomagnesia. At baseline, patient resides with spouse in a house and is independent in mobility using a cane. Her spouse assist in ADLs and provides transportation when needed. As per today's evaluation, patient is MI in Bed Mobility, required SBA and RW for transfers and ambulating 30ft. PT recommend HHPT at d/c.  -PG     Row Name 05/28/23 2110          Therapy Assessment/Plan (PT)    Patient/Family Therapy Goals Statement (PT) to return to PLOF  -PG     Rehab Potential (PT) good, to achieve stated therapy goals  -PG     Criteria for Skilled Interventions Met (PT) yes;skilled treatment is necessary  -PG     Therapy Frequency (PT) 3 times/wk  -PG     Predicted Duration of Therapy Intervention (PT) until d/c  -PG     Row Name 05/28/23 2110          Vital Signs    Pretreatment Heart Rate (beats/min) 61  -PG     Posttreatment  Heart Rate (beats/min) 76  -PG     Pretreatment Resp Rate (breaths/min) 22  -PG     Posttreatment Resp Rate (breaths/min) 25  -PG     Pre SpO2 (%) 96  -PG     O2 Delivery Pre Treatment room air  -PG     Post SpO2 (%) 97  -PG     O2 Delivery Post Treatment room air  -PG     Row Name 05/28/23 2110          Positioning and Restraints    Pre-Treatment Position in bed  -PG     Post Treatment Position bed  -PG     In Bed notified nsg;call light within reach;encouraged to call for assist;with family/caregiver  -PG           User Key  (r) = Recorded By, (t) = Taken By, (c) = Cosigned By    Initials Name Provider Type    Debra Love PT Physical Therapist               Outcome Measures     Row Name 05/28/23 2115          How much help from another person do you currently need...    Turning from your back to your side while in flat bed without using bedrails? 4  -PG     Moving from lying on back to sitting on the side of a flat bed without bedrails? 3  -PG     Moving to and from a bed to a chair (including a wheelchair)? 4  -PG     Standing up from a chair using your arms (e.g., wheelchair, bedside chair)? 4  -PG     Climbing 3-5 steps with a railing? 3  -PG     To walk in hospital room? 4  -PG     AM-PAC 6 Clicks Score (PT) 22  -PG     Highest level of mobility 7 --> Walked 25 feet or more  -PG     Row Name 05/28/23 2115          Functional Assessment    Outcome Measure Options AM-PAC 6 Clicks Basic Mobility (PT)  -PG           User Key  (r) = Recorded By, (t) = Taken By, (c) = Cosigned By    Initials Name Provider Type    Debra Love PT Physical Therapist                             Physical Therapy Education     Title: PT OT SLP Therapies (Done)     Topic: Physical Therapy (Done)     Point: Mobility training (Done)     Learning Progress Summary           Patient Acceptance, E, VU by PG at 5/28/2023 2116                   Point: Home exercise program (Done)     Learning Progress Summary           Patient  Acceptance, E, VU by PG at 5/28/2023 2116                   Point: Body mechanics (Done)     Learning Progress Summary           Patient Acceptance, E, VU by PG at 5/28/2023 2116                   Point: Precautions (Done)     Learning Progress Summary           Patient Acceptance, E, VU by PG at 5/28/2023 2116                               User Key     Initials Effective Dates Name Provider Type Discipline    PG 09/01/22 -  Debra Mckeon, PT Physical Therapist PT              PT Recommendation and Plan  Planned Therapy Interventions (PT): balance training, bed mobility training, gait training, home exercise program, neuromuscular re-education, patient/family education, postural re-education, transfer training, stretching, strengthening, stair training, ROM (range of motion)  Plan of Care Reviewed With: patient, family  Progress: improving  Outcome Evaluation: Patient 63 yo female admitted to the hospital with the complaint of Abdominal pain, Nausea and vomiting. Recent Dx includes high grade partial small bowel obstruction, Hypokalemia and hypomagnesia. At baseline, patient resides with spouse in a house and is independent in mobility using a cane. Her spouse assist in ADLs and provides transportation when needed. As per today's evaluation, patient is MI in Bed Mobility, required SBA and RW for transfers and ambulating 30ft. PT recommend HHPT at d/c.     Time Calculation:    PT Charges     Row Name 05/28/23 2116             Time Calculation    Start Time 1426  -PG      Stop Time 1438  -PG      Time Calculation (min) 12 min  -PG      PT Received On 05/28/23  -PG      PT - Next Appointment 05/30/23  -PG      PT Goal Re-Cert Due Date 06/11/23  -PG         Time Calculation- PT    Total Timed Code Minutes- PT 0 minute(s)  -PG            User Key  (r) = Recorded By, (t) = Taken By, (c) = Cosigned By    Initials Name Provider Type    PG Debra Mckeon, PT Physical Therapist              Therapy Charges for Today      Code Description Service Date Service Provider Modifiers Qty    [id number] HC PT EVAL MOD COMPLEXITY 3 5/28/2023 Debra Mckeon, PT GP 1          PT G-Codes  Outcome Measure Options: AM-PAC 6 Clicks Basic Mobility (PT)  AM-PAC 6 Clicks Score (PT): 22  PT Discharge Summary  Anticipated Discharge Disposition (PT): home with assist, home with home health    Debra Mckeon, PT  5/28/2023

## 2023-05-30 NOTE — CASE MANAGEMENT/SOCIAL WORK
Case Management Discharge Note      Final Note: Routine home.         Selected Continued Care - Discharged on 5/29/2023 Admission date: 5/26/2023 - Discharge disposition: Home or Self Care       Transportation Services  Private: Car    Final Discharge Disposition Code: 01 - home or self-care

## 2024-02-05 ENCOUNTER — HOSPITAL ENCOUNTER (OUTPATIENT)
Facility: HOSPITAL | Age: 64
Setting detail: OBSERVATION
Discharge: HOME | End: 2024-02-07
Attending: EMERGENCY MEDICINE | Admitting: EMERGENCY MEDICINE
Payer: MEDICARE

## 2024-02-05 ENCOUNTER — APPOINTMENT (OUTPATIENT)
Dept: CT IMAGING | Facility: HOSPITAL | Age: 64
End: 2024-02-05
Payer: MEDICARE

## 2024-02-05 DIAGNOSIS — R19.7 VOMITING AND DIARRHEA: Primary | ICD-10-CM

## 2024-02-05 DIAGNOSIS — E87.6 HYPOKALEMIA: ICD-10-CM

## 2024-02-05 DIAGNOSIS — R11.10 VOMITING AND DIARRHEA: Primary | ICD-10-CM

## 2024-02-05 DIAGNOSIS — R10.84 GENERALIZED ABDOMINAL PAIN: ICD-10-CM

## 2024-02-05 LAB
ALBUMIN SERPL-MCNC: 4.5 G/DL (ref 3.5–5.2)
ALBUMIN/GLOB SERPL: 1.3 G/DL
ALP SERPL-CCNC: 116 U/L (ref 39–117)
ALT SERPL W P-5'-P-CCNC: 24 U/L (ref 1–33)
ANION GAP SERPL CALCULATED.3IONS-SCNC: 16 MMOL/L (ref 5–15)
AST SERPL-CCNC: 21 U/L (ref 1–32)
BACTERIA UR QL AUTO: ABNORMAL /HPF
BASOPHILS # BLD AUTO: 0 10*3/MM3 (ref 0–0.2)
BASOPHILS NFR BLD AUTO: 0.6 % (ref 0–1.5)
BILIRUB SERPL-MCNC: 0.4 MG/DL (ref 0–1.2)
BILIRUB UR QL STRIP: NEGATIVE
BUN SERPL-MCNC: 15 MG/DL (ref 8–23)
BUN/CREAT SERPL: 16.3 (ref 7–25)
CALCIUM SPEC-SCNC: 9.9 MG/DL (ref 8.6–10.5)
CHLORIDE SERPL-SCNC: 101 MMOL/L (ref 98–107)
CLARITY UR: ABNORMAL
CO2 SERPL-SCNC: 20 MMOL/L (ref 22–29)
COLOR UR: YELLOW
CREAT SERPL-MCNC: 0.92 MG/DL (ref 0.57–1)
D-LACTATE SERPL-SCNC: 1.1 MMOL/L (ref 0.3–2)
DEPRECATED RDW RBC AUTO: 40.7 FL (ref 37–54)
EGFRCR SERPLBLD CKD-EPI 2021: 69.2 ML/MIN/1.73
EOSINOPHIL # BLD AUTO: 0.2 10*3/MM3 (ref 0–0.4)
EOSINOPHIL NFR BLD AUTO: 3 % (ref 0.3–6.2)
ERYTHROCYTE [DISTWIDTH] IN BLOOD BY AUTOMATED COUNT: 12.9 % (ref 12.3–15.4)
GLOBULIN UR ELPH-MCNC: 3.6 GM/DL
GLUCOSE SERPL-MCNC: 103 MG/DL (ref 65–99)
GLUCOSE UR STRIP-MCNC: NEGATIVE MG/DL
HCT VFR BLD AUTO: 42.3 % (ref 34–46.6)
HGB BLD-MCNC: 14.6 G/DL (ref 12–15.9)
HGB UR QL STRIP.AUTO: ABNORMAL
HOLD SPECIMEN: NORMAL
HYALINE CASTS UR QL AUTO: ABNORMAL /LPF
KETONES UR QL STRIP: NEGATIVE
LEUKOCYTE ESTERASE UR QL STRIP.AUTO: ABNORMAL
LIPASE SERPL-CCNC: 87 U/L (ref 13–60)
LYMPHOCYTES # BLD AUTO: 2.3 10*3/MM3 (ref 0.7–3.1)
LYMPHOCYTES NFR BLD AUTO: 37.7 % (ref 19.6–45.3)
MAGNESIUM SERPL-MCNC: 1.9 MG/DL (ref 1.6–2.4)
MCH RBC QN AUTO: 31.3 PG (ref 26.6–33)
MCHC RBC AUTO-ENTMCNC: 34.5 G/DL (ref 31.5–35.7)
MCV RBC AUTO: 90.7 FL (ref 79–97)
MONOCYTES # BLD AUTO: 0.9 10*3/MM3 (ref 0.1–0.9)
MONOCYTES NFR BLD AUTO: 13.9 % (ref 5–12)
NEUTROPHILS NFR BLD AUTO: 2.8 10*3/MM3 (ref 1.7–7)
NEUTROPHILS NFR BLD AUTO: 44.8 % (ref 42.7–76)
NITRITE UR QL STRIP: NEGATIVE
NRBC BLD AUTO-RTO: 0.1 /100 WBC (ref 0–0.2)
PH UR STRIP.AUTO: 6.5 [PH] (ref 5–8)
PLATELET # BLD AUTO: 331 10*3/MM3 (ref 140–450)
PMV BLD AUTO: 9.8 FL (ref 6–12)
POTASSIUM SERPL-SCNC: 2.4 MMOL/L (ref 3.5–5.2)
PROT SERPL-MCNC: 8.1 G/DL (ref 6–8.5)
PROT UR QL STRIP: NEGATIVE
RBC # BLD AUTO: 4.66 10*6/MM3 (ref 3.77–5.28)
RBC # UR STRIP: ABNORMAL /HPF
REF LAB TEST METHOD: ABNORMAL
SODIUM SERPL-SCNC: 137 MMOL/L (ref 136–145)
SP GR UR STRIP: 1.07 (ref 1–1.03)
SQUAMOUS #/AREA URNS HPF: ABNORMAL /HPF
UROBILINOGEN UR QL STRIP: ABNORMAL
WBC # UR STRIP: ABNORMAL /HPF
WBC NRBC COR # BLD AUTO: 6.1 10*3/MM3 (ref 3.4–10.8)
WHOLE BLOOD HOLD COAG: NORMAL
WHOLE BLOOD HOLD SPECIMEN: NORMAL

## 2024-02-05 PROCEDURE — 99285 EMERGENCY DEPT VISIT HI MDM: CPT

## 2024-02-05 PROCEDURE — 85025 COMPLETE CBC W/AUTO DIFF WBC: CPT | Performed by: EMERGENCY MEDICINE

## 2024-02-05 PROCEDURE — G0378 HOSPITAL OBSERVATION PER HR: HCPCS

## 2024-02-05 PROCEDURE — 25010000002 POTASSIUM CHLORIDE 10 MEQ/100ML SOLUTION: Performed by: EMERGENCY MEDICINE

## 2024-02-05 PROCEDURE — 83605 ASSAY OF LACTIC ACID: CPT

## 2024-02-05 PROCEDURE — 83735 ASSAY OF MAGNESIUM: CPT | Performed by: EMERGENCY MEDICINE

## 2024-02-05 PROCEDURE — 83690 ASSAY OF LIPASE: CPT | Performed by: EMERGENCY MEDICINE

## 2024-02-05 PROCEDURE — 25010000002 ONDANSETRON PER 1 MG: Performed by: EMERGENCY MEDICINE

## 2024-02-05 PROCEDURE — 96365 THER/PROPH/DIAG IV INF INIT: CPT

## 2024-02-05 PROCEDURE — 80053 COMPREHEN METABOLIC PANEL: CPT | Performed by: EMERGENCY MEDICINE

## 2024-02-05 PROCEDURE — 25510000001 IOPAMIDOL PER 1 ML: Performed by: EMERGENCY MEDICINE

## 2024-02-05 PROCEDURE — 74177 CT ABD & PELVIS W/CONTRAST: CPT

## 2024-02-05 PROCEDURE — 25010000002 HYDROMORPHONE 1 MG/ML SOLUTION: Performed by: EMERGENCY MEDICINE

## 2024-02-05 PROCEDURE — 96376 TX/PRO/DX INJ SAME DRUG ADON: CPT

## 2024-02-05 PROCEDURE — 96375 TX/PRO/DX INJ NEW DRUG ADDON: CPT

## 2024-02-05 PROCEDURE — 25810000003 SODIUM CHLORIDE 0.9 % SOLUTION: Performed by: EMERGENCY MEDICINE

## 2024-02-05 PROCEDURE — 81001 URINALYSIS AUTO W/SCOPE: CPT | Performed by: EMERGENCY MEDICINE

## 2024-02-05 PROCEDURE — 25010000002 HYDROMORPHONE 1 MG/ML SOLUTION: Performed by: PHYSICIAN ASSISTANT

## 2024-02-05 PROCEDURE — 25810000003 LACTATED RINGERS SOLUTION: Performed by: EMERGENCY MEDICINE

## 2024-02-05 PROCEDURE — 96366 THER/PROPH/DIAG IV INF ADDON: CPT

## 2024-02-05 RX ORDER — SODIUM CHLORIDE 9 MG/ML
40 INJECTION, SOLUTION INTRAVENOUS AS NEEDED
Status: DISCONTINUED | OUTPATIENT
Start: 2024-02-05 | End: 2024-02-07 | Stop reason: HOSPADM

## 2024-02-05 RX ORDER — FLUOXETINE HYDROCHLORIDE 40 MG/1
40 CAPSULE ORAL DAILY
COMMUNITY

## 2024-02-05 RX ORDER — ONDANSETRON 2 MG/ML
4 INJECTION INTRAMUSCULAR; INTRAVENOUS EVERY 6 HOURS PRN
Status: DISCONTINUED | OUTPATIENT
Start: 2024-02-05 | End: 2024-02-07 | Stop reason: HOSPADM

## 2024-02-05 RX ORDER — SODIUM CHLORIDE 9 MG/ML
125 INJECTION, SOLUTION INTRAVENOUS CONTINUOUS
Status: DISCONTINUED | OUTPATIENT
Start: 2024-02-05 | End: 2024-02-07 | Stop reason: HOSPADM

## 2024-02-05 RX ORDER — GABAPENTIN 100 MG/1
100 CAPSULE ORAL 2 TIMES DAILY
COMMUNITY

## 2024-02-05 RX ORDER — AMOXICILLIN 250 MG
2 CAPSULE ORAL 2 TIMES DAILY
Status: DISCONTINUED | OUTPATIENT
Start: 2024-02-05 | End: 2024-02-07 | Stop reason: HOSPADM

## 2024-02-05 RX ORDER — BISACODYL 5 MG/1
5 TABLET, DELAYED RELEASE ORAL DAILY PRN
Status: DISCONTINUED | OUTPATIENT
Start: 2024-02-05 | End: 2024-02-07 | Stop reason: HOSPADM

## 2024-02-05 RX ORDER — CHOLECALCIFEROL (VITAMIN D3) 125 MCG
5 CAPSULE ORAL NIGHTLY PRN
Status: DISCONTINUED | OUTPATIENT
Start: 2024-02-05 | End: 2024-02-07 | Stop reason: HOSPADM

## 2024-02-05 RX ORDER — POTASSIUM CHLORIDE 7.45 MG/ML
10 INJECTION INTRAVENOUS ONCE
Qty: 100 ML | Refills: 0 | Status: COMPLETED | OUTPATIENT
Start: 2024-02-05 | End: 2024-02-05

## 2024-02-05 RX ORDER — SODIUM CHLORIDE 0.9 % (FLUSH) 0.9 %
10 SYRINGE (ML) INJECTION AS NEEDED
Status: DISCONTINUED | OUTPATIENT
Start: 2024-02-05 | End: 2024-02-07 | Stop reason: HOSPADM

## 2024-02-05 RX ORDER — ACETAMINOPHEN 325 MG/1
650 TABLET ORAL EVERY 4 HOURS PRN
Status: DISCONTINUED | OUTPATIENT
Start: 2024-02-05 | End: 2024-02-07 | Stop reason: HOSPADM

## 2024-02-05 RX ORDER — ONDANSETRON 2 MG/ML
4 INJECTION INTRAMUSCULAR; INTRAVENOUS ONCE
Status: COMPLETED | OUTPATIENT
Start: 2024-02-05 | End: 2024-02-05

## 2024-02-05 RX ORDER — POTASSIUM CHLORIDE 7.45 MG/ML
10 INJECTION INTRAVENOUS ONCE
Qty: 100 ML | Refills: 0 | Status: DISCONTINUED | OUTPATIENT
Start: 2024-02-06 | End: 2024-02-06

## 2024-02-05 RX ORDER — POTASSIUM CHLORIDE 7.45 MG/ML
10 INJECTION INTRAVENOUS ONCE
Qty: 100 ML | Refills: 0 | Status: COMPLETED | OUTPATIENT
Start: 2024-02-05 | End: 2024-02-06

## 2024-02-05 RX ORDER — LEVOTHYROXINE SODIUM 0.15 MG/1
150 TABLET ORAL DAILY
COMMUNITY

## 2024-02-05 RX ORDER — CYCLOBENZAPRINE HCL 5 MG
5 TABLET ORAL 2 TIMES DAILY PRN
COMMUNITY

## 2024-02-05 RX ORDER — FAMOTIDINE 20 MG/1
20 TABLET, FILM COATED ORAL 2 TIMES DAILY
COMMUNITY

## 2024-02-05 RX ORDER — BISACODYL 10 MG
10 SUPPOSITORY, RECTAL RECTAL DAILY PRN
Status: DISCONTINUED | OUTPATIENT
Start: 2024-02-05 | End: 2024-02-07 | Stop reason: HOSPADM

## 2024-02-05 RX ORDER — POTASSIUM CHLORIDE 7.45 MG/ML
10 INJECTION INTRAVENOUS ONCE
Qty: 100 ML | Refills: 0 | Status: COMPLETED | OUTPATIENT
Start: 2024-02-06 | End: 2024-02-06

## 2024-02-05 RX ORDER — POLYETHYLENE GLYCOL 3350 17 G/17G
17 POWDER, FOR SOLUTION ORAL DAILY PRN
Status: DISCONTINUED | OUTPATIENT
Start: 2024-02-05 | End: 2024-02-07 | Stop reason: HOSPADM

## 2024-02-05 RX ORDER — SODIUM CHLORIDE 0.9 % (FLUSH) 0.9 %
10 SYRINGE (ML) INJECTION EVERY 12 HOURS SCHEDULED
Status: DISCONTINUED | OUTPATIENT
Start: 2024-02-05 | End: 2024-02-07 | Stop reason: HOSPADM

## 2024-02-05 RX ADMIN — POTASSIUM CHLORIDE 10 MEQ: 7.46 INJECTION, SOLUTION INTRAVENOUS at 20:36

## 2024-02-05 RX ADMIN — SODIUM CHLORIDE, POTASSIUM CHLORIDE, SODIUM LACTATE AND CALCIUM CHLORIDE 1000 ML: 600; 310; 30; 20 INJECTION, SOLUTION INTRAVENOUS at 16:41

## 2024-02-05 RX ADMIN — ONDANSETRON 4 MG: 2 INJECTION INTRAMUSCULAR; INTRAVENOUS at 16:41

## 2024-02-05 RX ADMIN — IOPAMIDOL 100 ML: 755 INJECTION, SOLUTION INTRAVENOUS at 18:00

## 2024-02-05 RX ADMIN — HYDROMORPHONE HYDROCHLORIDE 0.5 MG: 1 INJECTION, SOLUTION INTRAMUSCULAR; INTRAVENOUS; SUBCUTANEOUS at 23:15

## 2024-02-05 RX ADMIN — ONDANSETRON 4 MG: 2 INJECTION INTRAMUSCULAR; INTRAVENOUS at 23:15

## 2024-02-05 RX ADMIN — SODIUM CHLORIDE 125 ML/HR: 9 INJECTION, SOLUTION INTRAVENOUS at 23:16

## 2024-02-05 RX ADMIN — HYDROMORPHONE HYDROCHLORIDE 0.5 MG: 1 INJECTION, SOLUTION INTRAMUSCULAR; INTRAVENOUS; SUBCUTANEOUS at 16:40

## 2024-02-05 RX ADMIN — POTASSIUM CHLORIDE 10 MEQ: 7.46 INJECTION, SOLUTION INTRAVENOUS at 23:16

## 2024-02-06 ENCOUNTER — ON CAMPUS - OUTPATIENT (AMBULATORY)
Dept: URBAN - METROPOLITAN AREA HOSPITAL 85 | Facility: HOSPITAL | Age: 64
End: 2024-02-06

## 2024-02-06 DIAGNOSIS — R10.13 EPIGASTRIC PAIN: ICD-10-CM

## 2024-02-06 DIAGNOSIS — R74.8 ABNORMAL LEVELS OF OTHER SERUM ENZYMES: ICD-10-CM

## 2024-02-06 DIAGNOSIS — E87.6 HYPOKALEMIA: ICD-10-CM

## 2024-02-06 DIAGNOSIS — R93.3 ABNORMAL FINDINGS ON DIAGNOSTIC IMAGING OF OTHER PARTS OF DI: ICD-10-CM

## 2024-02-06 DIAGNOSIS — R11.2 NAUSEA WITH VOMITING, UNSPECIFIED: ICD-10-CM

## 2024-02-06 DIAGNOSIS — Z90.49 ACQUIRED ABSENCE OF OTHER SPECIFIED PARTS OF DIGESTIVE TRACT: ICD-10-CM

## 2024-02-06 DIAGNOSIS — A04.5 CAMPYLOBACTER ENTERITIS: ICD-10-CM

## 2024-02-06 LAB
ADV 40+41 DNA STL QL NAA+NON-PROBE: NOT DETECTED
ANION GAP SERPL CALCULATED.3IONS-SCNC: 12 MMOL/L (ref 5–15)
ASTRO TYP 1-8 RNA STL QL NAA+NON-PROBE: NOT DETECTED
BASOPHILS # BLD AUTO: 0 10*3/MM3 (ref 0–0.2)
BASOPHILS NFR BLD AUTO: 0.5 % (ref 0–1.5)
BUN SERPL-MCNC: 11 MG/DL (ref 8–23)
BUN/CREAT SERPL: 13.4 (ref 7–25)
C CAYETANENSIS DNA STL QL NAA+NON-PROBE: NOT DETECTED
C COLI+JEJ+UPSA DNA STL QL NAA+NON-PROBE: DETECTED
CALCIUM SPEC-SCNC: 9.1 MG/DL (ref 8.6–10.5)
CHLORIDE SERPL-SCNC: 104 MMOL/L (ref 98–107)
CO2 SERPL-SCNC: 22 MMOL/L (ref 22–29)
CREAT SERPL-MCNC: 0.82 MG/DL (ref 0.57–1)
CRYPTOSP DNA STL QL NAA+NON-PROBE: NOT DETECTED
DEPRECATED RDW RBC AUTO: 42.9 FL (ref 37–54)
E HISTOLYT DNA STL QL NAA+NON-PROBE: NOT DETECTED
EAEC PAA PLAS AGGR+AATA ST NAA+NON-PRB: NOT DETECTED
EC STX1+STX2 GENES STL QL NAA+NON-PROBE: NOT DETECTED
EGFRCR SERPLBLD CKD-EPI 2021: 79.5 ML/MIN/1.73
EOSINOPHIL # BLD AUTO: 0.2 10*3/MM3 (ref 0–0.4)
EOSINOPHIL NFR BLD AUTO: 3.8 % (ref 0.3–6.2)
EPEC EAE GENE STL QL NAA+NON-PROBE: NOT DETECTED
ERYTHROCYTE [DISTWIDTH] IN BLOOD BY AUTOMATED COUNT: 12.9 % (ref 12.3–15.4)
ETEC LTA+ST1A+ST1B TOX ST NAA+NON-PROBE: NOT DETECTED
G LAMBLIA DNA STL QL NAA+NON-PROBE: NOT DETECTED
GLUCOSE SERPL-MCNC: 93 MG/DL (ref 65–99)
HCT VFR BLD AUTO: 36.6 % (ref 34–46.6)
HGB BLD-MCNC: 12.7 G/DL (ref 12–15.9)
LYMPHOCYTES # BLD AUTO: 2.4 10*3/MM3 (ref 0.7–3.1)
LYMPHOCYTES NFR BLD AUTO: 40.2 % (ref 19.6–45.3)
MCH RBC QN AUTO: 30.6 PG (ref 26.6–33)
MCHC RBC AUTO-ENTMCNC: 34.6 G/DL (ref 31.5–35.7)
MCV RBC AUTO: 88.6 FL (ref 79–97)
MONOCYTES # BLD AUTO: 0.7 10*3/MM3 (ref 0.1–0.9)
MONOCYTES NFR BLD AUTO: 12.1 % (ref 5–12)
NEUTROPHILS NFR BLD AUTO: 2.5 10*3/MM3 (ref 1.7–7)
NEUTROPHILS NFR BLD AUTO: 43.4 % (ref 42.7–76)
NOROVIRUS GI+II RNA STL QL NAA+NON-PROBE: NOT DETECTED
NRBC BLD AUTO-RTO: 0 /100 WBC (ref 0–0.2)
P SHIGELLOIDES DNA STL QL NAA+NON-PROBE: NOT DETECTED
PLATELET # BLD AUTO: 257 10*3/MM3 (ref 140–450)
PMV BLD AUTO: 10.3 FL (ref 6–12)
POTASSIUM SERPL-SCNC: 2.6 MMOL/L (ref 3.5–5.2)
RBC # BLD AUTO: 4.13 10*6/MM3 (ref 3.77–5.28)
RVA RNA STL QL NAA+NON-PROBE: NOT DETECTED
S ENT+BONG DNA STL QL NAA+NON-PROBE: NOT DETECTED
SAPO I+II+IV+V RNA STL QL NAA+NON-PROBE: NOT DETECTED
SHIGELLA SP+EIEC IPAH ST NAA+NON-PROBE: NOT DETECTED
SODIUM SERPL-SCNC: 138 MMOL/L (ref 136–145)
V CHOL+PARA+VUL DNA STL QL NAA+NON-PROBE: NOT DETECTED
V CHOLERAE DNA STL QL NAA+NON-PROBE: NOT DETECTED
WBC NRBC COR # BLD AUTO: 5.9 10*3/MM3 (ref 3.4–10.8)
Y ENTEROCOL DNA STL QL NAA+NON-PROBE: NOT DETECTED

## 2024-02-06 PROCEDURE — 25010000002 ONDANSETRON PER 1 MG: Performed by: EMERGENCY MEDICINE

## 2024-02-06 PROCEDURE — 80048 BASIC METABOLIC PNL TOTAL CA: CPT | Performed by: EMERGENCY MEDICINE

## 2024-02-06 PROCEDURE — G0378 HOSPITAL OBSERVATION PER HR: HCPCS

## 2024-02-06 PROCEDURE — 97161 PT EVAL LOW COMPLEX 20 MIN: CPT

## 2024-02-06 PROCEDURE — 96366 THER/PROPH/DIAG IV INF ADDON: CPT

## 2024-02-06 PROCEDURE — 93005 ELECTROCARDIOGRAM TRACING: CPT | Performed by: PHYSICIAN ASSISTANT

## 2024-02-06 PROCEDURE — 25810000003 SODIUM CHLORIDE 0.9 % SOLUTION: Performed by: EMERGENCY MEDICINE

## 2024-02-06 PROCEDURE — 87324 CLOSTRIDIUM AG IA: CPT | Performed by: NURSE PRACTITIONER

## 2024-02-06 PROCEDURE — 25010000002 HYDROMORPHONE 1 MG/ML SOLUTION: Performed by: PHYSICIAN ASSISTANT

## 2024-02-06 PROCEDURE — 96376 TX/PRO/DX INJ SAME DRUG ADON: CPT

## 2024-02-06 PROCEDURE — 87449 NOS EACH ORGANISM AG IA: CPT | Performed by: NURSE PRACTITIONER

## 2024-02-06 PROCEDURE — 25010000002 POTASSIUM CHLORIDE 10 MEQ/100ML SOLUTION: Performed by: EMERGENCY MEDICINE

## 2024-02-06 PROCEDURE — 99221 1ST HOSP IP/OBS SF/LOW 40: CPT | Performed by: NURSE PRACTITIONER

## 2024-02-06 PROCEDURE — 87507 IADNA-DNA/RNA PROBE TQ 12-25: CPT | Performed by: PHYSICIAN ASSISTANT

## 2024-02-06 PROCEDURE — 85025 COMPLETE CBC W/AUTO DIFF WBC: CPT | Performed by: EMERGENCY MEDICINE

## 2024-02-06 RX ORDER — OXYCODONE HYDROCHLORIDE 5 MG/1
10 TABLET ORAL EVERY 4 HOURS PRN
Status: DISCONTINUED | OUTPATIENT
Start: 2024-02-06 | End: 2024-02-07 | Stop reason: HOSPADM

## 2024-02-06 RX ORDER — LEVOTHYROXINE SODIUM 0.15 MG/1
150 TABLET ORAL
Status: DISCONTINUED | OUTPATIENT
Start: 2024-02-06 | End: 2024-02-07 | Stop reason: HOSPADM

## 2024-02-06 RX ORDER — FLUOXETINE HYDROCHLORIDE 20 MG/1
40 CAPSULE ORAL DAILY
Status: DISCONTINUED | OUTPATIENT
Start: 2024-02-06 | End: 2024-02-07 | Stop reason: HOSPADM

## 2024-02-06 RX ORDER — SACCHAROMYCES BOULARDII 250 MG
250 CAPSULE ORAL 2 TIMES DAILY
Status: DISCONTINUED | OUTPATIENT
Start: 2024-02-06 | End: 2024-02-07 | Stop reason: HOSPADM

## 2024-02-06 RX ORDER — FAMOTIDINE 20 MG/1
20 TABLET, FILM COATED ORAL 2 TIMES DAILY
Status: DISCONTINUED | OUTPATIENT
Start: 2024-02-06 | End: 2024-02-07 | Stop reason: HOSPADM

## 2024-02-06 RX ORDER — GABAPENTIN 100 MG/1
100 CAPSULE ORAL 2 TIMES DAILY
Status: DISCONTINUED | OUTPATIENT
Start: 2024-02-06 | End: 2024-02-07 | Stop reason: HOSPADM

## 2024-02-06 RX ORDER — CYCLOBENZAPRINE HCL 10 MG
5 TABLET ORAL 2 TIMES DAILY PRN
Status: DISCONTINUED | OUTPATIENT
Start: 2024-02-06 | End: 2024-02-07 | Stop reason: HOSPADM

## 2024-02-06 RX ORDER — LEVOFLOXACIN 750 MG/1
750 TABLET, FILM COATED ORAL EVERY 24 HOURS
Status: DISCONTINUED | OUTPATIENT
Start: 2024-02-06 | End: 2024-02-07 | Stop reason: HOSPADM

## 2024-02-06 RX ORDER — ATORVASTATIN CALCIUM 40 MG/1
80 TABLET, FILM COATED ORAL NIGHTLY
Status: DISCONTINUED | OUTPATIENT
Start: 2024-02-06 | End: 2024-02-07 | Stop reason: HOSPADM

## 2024-02-06 RX ORDER — DICYCLOMINE HYDROCHLORIDE 10 MG/1
20 CAPSULE ORAL 4 TIMES DAILY
Status: DISCONTINUED | OUTPATIENT
Start: 2024-02-06 | End: 2024-02-07 | Stop reason: HOSPADM

## 2024-02-06 RX ORDER — POTASSIUM CHLORIDE 20 MEQ/1
40 TABLET, EXTENDED RELEASE ORAL EVERY 4 HOURS
Qty: 6 TABLET | Refills: 0 | Status: COMPLETED | OUTPATIENT
Start: 2024-02-06 | End: 2024-02-06

## 2024-02-06 RX ADMIN — HYDROMORPHONE HYDROCHLORIDE 0.5 MG: 1 INJECTION, SOLUTION INTRAMUSCULAR; INTRAVENOUS; SUBCUTANEOUS at 17:37

## 2024-02-06 RX ADMIN — HYDROMORPHONE HYDROCHLORIDE 0.5 MG: 1 INJECTION, SOLUTION INTRAMUSCULAR; INTRAVENOUS; SUBCUTANEOUS at 07:57

## 2024-02-06 RX ADMIN — HYDROMORPHONE HYDROCHLORIDE 0.5 MG: 1 INJECTION, SOLUTION INTRAMUSCULAR; INTRAVENOUS; SUBCUTANEOUS at 11:10

## 2024-02-06 RX ADMIN — POTASSIUM CHLORIDE 40 MEQ: 1500 TABLET, EXTENDED RELEASE ORAL at 12:34

## 2024-02-06 RX ADMIN — Medication 250 MG: at 20:50

## 2024-02-06 RX ADMIN — POTASSIUM CHLORIDE 10 MEQ: 7.46 INJECTION, SOLUTION INTRAVENOUS at 01:06

## 2024-02-06 RX ADMIN — POTASSIUM CHLORIDE 10 MEQ: 7.46 INJECTION, SOLUTION INTRAVENOUS at 02:24

## 2024-02-06 RX ADMIN — ONDANSETRON 4 MG: 2 INJECTION INTRAMUSCULAR; INTRAVENOUS at 21:02

## 2024-02-06 RX ADMIN — ONDANSETRON 4 MG: 2 INJECTION INTRAMUSCULAR; INTRAVENOUS at 07:48

## 2024-02-06 RX ADMIN — ATORVASTATIN CALCIUM 80 MG: 40 TABLET, FILM COATED ORAL at 20:50

## 2024-02-06 RX ADMIN — GABAPENTIN 100 MG: 100 CAPSULE ORAL at 20:50

## 2024-02-06 RX ADMIN — ONDANSETRON 4 MG: 2 INJECTION INTRAMUSCULAR; INTRAVENOUS at 14:31

## 2024-02-06 RX ADMIN — HYDROMORPHONE HYDROCHLORIDE 0.5 MG: 1 INJECTION, SOLUTION INTRAMUSCULAR; INTRAVENOUS; SUBCUTANEOUS at 20:50

## 2024-02-06 RX ADMIN — POTASSIUM CHLORIDE 40 MEQ: 1500 TABLET, EXTENDED RELEASE ORAL at 20:50

## 2024-02-06 RX ADMIN — Medication 10 ML: at 09:39

## 2024-02-06 RX ADMIN — POTASSIUM CHLORIDE 40 MEQ: 1500 TABLET, EXTENDED RELEASE ORAL at 16:46

## 2024-02-06 RX ADMIN — SODIUM CHLORIDE 125 ML/HR: 9 INJECTION, SOLUTION INTRAVENOUS at 10:12

## 2024-02-06 RX ADMIN — FLUOXETINE 40 MG: 20 CAPSULE ORAL at 08:48

## 2024-02-06 RX ADMIN — HYDROMORPHONE HYDROCHLORIDE 0.5 MG: 1 INJECTION, SOLUTION INTRAMUSCULAR; INTRAVENOUS; SUBCUTANEOUS at 14:30

## 2024-02-06 RX ADMIN — DICYCLOMINE HYDROCHLORIDE 20 MG: 10 CAPSULE ORAL at 20:50

## 2024-02-06 RX ADMIN — FAMOTIDINE 20 MG: 20 TABLET ORAL at 08:48

## 2024-02-06 RX ADMIN — FAMOTIDINE 20 MG: 20 TABLET ORAL at 20:50

## 2024-02-06 RX ADMIN — GABAPENTIN 100 MG: 100 CAPSULE ORAL at 08:48

## 2024-02-06 RX ADMIN — LEVOTHYROXINE SODIUM 150 MCG: 0.15 TABLET ORAL at 07:52

## 2024-02-06 RX ADMIN — DICYCLOMINE HYDROCHLORIDE 20 MG: 10 CAPSULE ORAL at 11:10

## 2024-02-06 RX ADMIN — DICYCLOMINE HYDROCHLORIDE 20 MG: 10 CAPSULE ORAL at 18:46

## 2024-02-06 RX ADMIN — LEVOFLOXACIN 750 MG: 750 TABLET, FILM COATED ORAL at 14:31

## 2024-02-06 RX ADMIN — POTASSIUM CHLORIDE 10 MEQ: 7.46 INJECTION, SOLUTION INTRAVENOUS at 07:43

## 2024-02-06 RX ADMIN — Medication 250 MG: at 11:10

## 2024-02-06 NOTE — PLAN OF CARE
Problem: Adult Inpatient Plan of Care  Goal: Absence of Hospital-Acquired Illness or Injury  Outcome: Ongoing, Progressing     Problem: Osteoarthritis Comorbidity  Goal: Maintenance of Osteoarthritis Symptom Control  Outcome: Ongoing, Progressing     Problem: Pain Chronic (Persistent) (Comorbidity Management)  Goal: Acceptable Pain Control and Functional Ability  Outcome: Ongoing, Progressing     Problem: Adult Inpatient Plan of Care  Goal: Readiness for Transition of Care  Intervention: Mutually Develop Transition Plan  Recent Flowsheet Documentation  Taken 2/5/2024 2334 by Lizbet Hackett, RN  Equipment Currently Used at Home:   cane, quad   walker, standard   wheelchair  Taken 2/5/2024 2332 by Lizbet Hackett, RN  Transportation Anticipated: family or friend will provide  Patient/Family Anticipated Services at Transition: none  Patient/Family Anticipates Transition to: home   Goal Outcome Evaluation:      Pt admitted to the floor with n/v/d, has abdominal and generalized pain 9/10, given pain medication. Pt resting comfortably will continue to monitor.

## 2024-02-06 NOTE — THERAPY EVALUATION
Patient Name: Kavita Garcia  : 1960    MRN: [id number]                              Today's Date: 2024       Admit Date: 2024    Visit Dx:     ICD-10-CM ICD-9-CM   1. Vomiting and diarrhea  R11.10 787.03    R19.7 787.91   2. Generalized abdominal pain  R10.84 789.07   3. Hypokalemia  E87.6 276.8     Patient Active Problem List   Diagnosis    Arthritis    Chronic pain    Degeneration of intervertebral disc of lumbar region    Degeneration of intervertebral disc of thoracic region    Depression    Fibromyositis    Hyperlipidemia    Hypertension    Hypothyroidism    Other long term (current) drug therapy    Postlaminectomy syndrome, not elsewhere classified    Abdominal pain    Breakdown (mechanical) of int fix of vertebrae, init    Cervical spinal stenosis    Lumbar stenosis    Closed wedge compression fracture of T10 vertebra    Displacement of internal fixation device of vertebrae    Ileus, postoperative    Kyphosis of thoracolumbar region    Lumbar radiculopathy    Lumbar scoliosis    Neuropathy    Osteoporosis    Ankylosis of spine    Pseudarthrosis following spinal fusion    Pseudoarthrosis of spine    S/P lumbar fusion    Acquired postural kyphosis    Secondary kyphosis deformity of spine    Status post left hip replacement    Tobacco use    DDD (degenerative disc disease)    Anxiety and depression    Nausea and vomiting, unspecified vomiting type    Hypokalemia     Past Medical History:   Diagnosis Date    Arthritis     Colon perforation 2012    Depression     GERD     Hypothyroidism     MAMMO     Neuropathy     Hands/ Feet    Osteoporosis     PAP     Rheumatoid aortitis     Scoliosis 2010    Stomach ulcer 2012     Past Surgical History:   Procedure Laterality Date    BACK SURGERY  [id number]    6 surgeries    COLON SURGERY      Colon Resection    COLONOSCOPY        General Information       Row Name 24 1012          Physical Therapy Time and Intention    Document Type evaluation  -CR      Mode of Treatment physical therapy  -CR       Row Name 02/06/24 1012          General Information    Patient Profile Reviewed yes  -CR     Prior Level of Function independent:;all household mobility  using rw or cane  -CR     Barriers to Rehab medically complex;previous functional deficit  -CR       Row Name 02/06/24 1012          Living Environment    People in Home alone  spouse passed away unexpectedly recently, currently sister staying with patient but not permanently  -CR       Row Name 02/06/24 1012          Home Main Entrance    Number of Stairs, Main Entrance two  -CR       Row Name 02/06/24 1012          Stairs Within Home, Primary    Number of Stairs, Within Home, Primary none  -CR       Row Name 02/06/24 1012          Cognition    Orientation Status (Cognition) oriented x 4  -CR       Row Name 02/06/24 1012          Safety Issues, Functional Mobility    Impairments Affecting Function (Mobility) endurance/activity tolerance;pain  -CR               User Key  (r) = Recorded By, (t) = Taken By, (c) = Cosigned By      Initials Name Provider Type    CR Reyes, Carmela, PT Physical Therapist                   Mobility       Row Name 02/06/24 1015          Bed Mobility    Bed Mobility bed mobility (all) activities  -CR     All Activities, [place] (Bed Mobility) modified independence  -CR     Assistive Device (Bed Mobility) bed rails  -CR       Row Name 02/06/24 1015          Sit-Stand Transfer    Sit-Stand [place] (Transfers) standby assist  -CR     Assistive Device (Sit-Stand Transfers) cane, straight  -CR       Row Name 02/06/24 1015          Gait/Stairs (Locomotion)    [place] Level (Gait) standby assist  -CR     Assistive Device (Gait) cane, straight  -CR     Distance in Feet (Gait) 20  -CR     Deviations/Abnormal Patterns (Gait) gait speed decreased  -CR     Bilateral Gait Deviations forward flexed posture  -CR               User Key  (r) = Recorded By, (t) = Taken By, (c) = Cosigned By       Initials Name Provider Type    CR Reyes, Carmela, PT Physical Therapist                   Obj/Interventions       Row Name 02/06/24 1015          Range of Motion Comprehensive    General Range of Motion bilateral upper extremity ROM WFL;bilateral lower extremity ROM WFL  -CR     Comment, General Range of Motion trunk limited extension  -CR       Row Name 02/06/24 1015          Strength Comprehensive (MMT)    Comment, General Manual Muscle Testing (MMT) Assessment grossly 3/5  -CR       Row Name 02/06/24 1015          Balance    Balance Assessment sitting static balance;standing static balance;standing dynamic balance  -CR     Static Sitting Balance independent  -CR     Position, Sitting Balance sitting edge of bed  -CR     Static Standing Balance modified independence  -CR     Dynamic Standing Balance modified independence  -CR     Position/Device Used, Standing Balance cane, straight  -CR               User Key  (r) = Recorded By, (t) = Taken By, (c) = Cosigned By      Initials Name Provider Type    CR Reyes, Carmela, PT Physical Therapist                   Goals/Plan    No documentation.                  Clinical Impression       Row Name 02/06/24 1016          Pain    Additional Documentation Pain Scale: FACES Pre/Post-Treatment (Group)  -CR       Row Name 02/06/24 1016          Pain Scale: FACES Pre/Post-Treatment    Pain: FACES Scale, Pretreatment 2-->hurts little bit  -CR     Posttreatment Pain Rating 4-->hurts little more  -CR     Pain Location - abdomen  -CR       Row Name 02/06/24 1016          Plan of Care Review    Plan of Care Reviewed With patient  -CR     Outcome Evaluation 64 y/o female brought in hospital on 2/5 due to abd pain with n/v/d x 3 days, found to have hypokalemia. PMH includes colon perforation in past with similar symptoms, osteoporosis, scoliosis, RA, neuropathy, multiple back surgeries. Patient lost her spouse unexpectedly very recently who was her primary caregiver. Patient uses rw or  cane for mobility and spouse assists with ADL's and most of household tasks due to patient's chronic back issues.Patient uses wc for longer distance mobility.  At time of eval, patient is modified independent with bed mobility and transfers using cane. Patient ambulated 20 ft using cane with SBA; gait slow with marked flexed posture and fatigue at end. Patient appears to be at her baseline for mobility , limited by gen weakness but anticipate improvement when medically stable.  -CR       Row Name 02/06/24 1016          Therapy Assessment/Plan (PT)    Patient/Family Therapy Goals Statement (PT) find an GRAY to live in  -CR     Criteria for Skilled Interventions Met (PT) no;does not meet criteria for skilled intervention  -CR     Therapy Frequency (PT) evaluation only  -CR     Predicted Duration of Therapy Intervention (PT) dc  -CR               User Key  (r) = Recorded By, (t) = Taken By, (c) = Cosigned By      Initials Name Provider Type    CR Reyes, Carmela, PT Physical Therapist                   Outcome Measures       Row Name 02/06/24 1022 02/05/24 7220       How much help from another person do you currently need...    Turning from your back to your side while in flat bed without using bedrails? 4  -CR 3  -LG    Moving from lying on back to sitting on the side of a flat bed without bedrails? 4  -CR 3  -LG    Moving to and from a bed to a chair (including a wheelchair)? 4  -CR 3  -LG    Standing up from a chair using your arms (e.g., wheelchair, bedside chair)? 4  -CR 3  -LG    Climbing 3-5 steps with a railing? 3  -CR 3  -LG    To walk in hospital room? 4  -CR 3  -LG    AM-PAC 6 Clicks Score (PT) 23  -CR 18  -LG    Highest Level of Mobility Goal 7 --> Walk 25 feet or more  -CR 6 --> Walk 10 steps or more  -LG              User Key  (r) = Recorded By, (t) = Taken By, (c) = Cosigned By      Initials Name Provider Type    CR Reyes, Carmela, PT Physical Therapist    Lizbet Doran, RN Registered Nurse                                  Physical Therapy Education       Title: PT OT SLP Therapies (Done)       Topic: Physical Therapy (Done)       Point: Mobility training (Done)       Learning Progress Summary             Patient Acceptance, E, DU by CR at 2/6/2024 1022                                         User Key       Initials Effective Dates Name Provider Type Discipline    CR 06/16/21 -  Reyes, Carmela, PT Physical Therapist PT                  PT Recommendation and Plan     Plan of Care Reviewed With: patient  Outcome Evaluation: 64 y/o female brought in hospital on 2/5 due to abd pain with n/v/d x 3 days, found to have hypokalemia. PMH includes colon perforation in past with similar symptoms, osteoporosis, scoliosis, RA, neuropathy, multiple back surgeries. Patient lost her spouse unexpectedly very recently who was her primary caregiver. Patient uses rw or cane for mobility and spouse assists with ADL's and most of household tasks due to patient's chronic back issues.Patient uses wc for longer distance mobility.  At time of eval, patient is modified independent with bed mobility and transfers using cane. Patient ambulated 20 ft using cane with SBA; gait slow with marked flexed posture and fatigue at end. Patient appears to be at her baseline for mobility , limited by gen weakness but anticipate improvement when medically stable.     Time Calculation:         PT Charges       Row Name 02/06/24 1023             Time Calculation    Start Time 0855  -CR      Stop Time 0913  -CR      Time Calculation (min) 18 min  -CR      PT Received On 02/06/24  -CR         Time Calculation- PT    Total Timed Code Minutes- PT 0 minute(s)  -CR                User Key  (r) = Recorded By, (t) = Taken By, (c) = Cosigned By      Initials Name Provider Type    CR Reyes, Carmela, PT Physical Therapist                  Therapy Charges for Today       Code Description Service Date Service Provider Modifiers Qty    [id number]  PT EVAL LOW COMPLEXITY  4 2/6/2024 Reyes, Carmela, PT GP 1            PT G-Codes  AM-PAC 6 Clicks Score (PT): 23  PT Discharge Summary  Anticipated Discharge Disposition (PT): home with assist, assisted living    Carmela Reyes, PT  2/6/2024

## 2024-02-06 NOTE — CONSULTS
Initial spiritual care consult. Pt in room with daughter at bedside. Pt told  about the recent passing of her second . She was sad and grieving his loss. Her spouse was taking care of her because of her physical disabilities and now feels lost without him there. He did many things around the house and she must now step in to do those tasks. She spoke much about grief and  listened empathically as she processed her emotions and wondering about what comes next. She is thankful for her family who will help her and also for her  who paid for the cremation of her . She asked for prayer and was thankful for the support system she has. She hopes to be discharged by Friday because that is the scheduled day of her 's memorial service at her [employer]. There were no other needs at this time.  will continue to follow for support.

## 2024-02-06 NOTE — CONSULTS
GI CONSULT  NOTE:    Referring Provider:  BAUTISTA Davey    Chief complaint: Nausea/vomiting, diarrhea    Subjective .     History of present illness: Kavita Garcia is a 63 y.o. female with history of perforated gastric ulcer s/p partial gastrectomy, chronic back pain, GERD, ischemic colitis, cholecystectomy, and colon perforation in 2012 s/p colon resection who presents with complaints of nausea/vomiting.  The patient reports that she has been having intermittent nausea/vomiting for the past couple months, but for the past 1 week her nausea/vomiting has been multiple times daily.  Denies hematemesis or coffee-ground emesis.  Does complain of some heartburn.  No dysphagia.  Denies NSAID use.  She does complain of some upper abdominal pain that radiates into the lower abdomen.  This was initially intermittent, but is now constant.  She describes the pain as cramping/sharp in nature.  Eating seems to worsen her pain.  She states that she has been having diarrhea up to 10 times daily.  Denies bright red blood per rectum or melena.  No recent antibiotic use.  No recent fever or unintentional weight loss.    Endo History:  9/2014 Colonoscopy (Dr. Barbour) - high grade ischemic stricture in transverse colon with obstruction and inability to pass scope s/p tattoo  5/2014 Colonoscopy - moderate/severe ischemic colitis (bx ischemia)    Past Medical History:  Past Medical History:   Diagnosis Date    Arthritis     Colon perforation 2012    Depression     GERD     Hypothyroidism     MAMMO     Neuropathy     Hands/ Feet    Osteoporosis     PAP     Rheumatoid aortitis     Scoliosis 2010    Stomach ulcer 2012       Past Surgical History:  Past Surgical History:   Procedure Laterality Date    BACK SURGERY  [id number]    6 surgeries    COLON SURGERY      Colon Resection    COLONOSCOPY         Social History:  Social History     Tobacco Use    Smoking status: Former     Packs/day: 0.50     Years: 25.00     Additional pack  years: 0.00     Total pack years: 12.50     Types: Cigarettes     Quit date:      Years since quittin.1    Smokeless tobacco: Never   Vaping Use    Vaping Use: Never used   Substance Use Topics    Alcohol use: Never    Drug use: Not Currently     Comment: Marijuana ---- occasionally       Family History:  Family History   Problem Relation Age of Onset    Arthritis Mother     Cancer Mother 58        Colon Cancer    Osteoporosis Mother     Hypertension Mother     Stroke Mother     Hyperlipidemia Mother     Thyroid disease Mother     Liver disease Father     Alcohol abuse Father     Thyroid disease Sister     Cancer Maternal Aunt         lung       Medications:  Medications Prior to Admission   Medication Sig Dispense Refill Last Dose    atorvastatin (LIPITOR) 80 MG tablet Take 1 tablet by mouth Every Night.   2024 at 2200    cyclobenzaprine (FLEXERIL) 5 MG tablet Take 1 tablet by mouth 2 (Two) Times a Day As Needed for Muscle Spasms.   2024 at 2200    famotidine (PEPCID) 20 MG tablet Take 1 tablet by mouth 2 (Two) Times a Day.   2024 at 1200    FLUoxetine (PROzac) 40 MG capsule Take 1 capsule by mouth Daily.   2024 at 0900    gabapentin (NEURONTIN) 100 MG capsule Take 1 capsule by mouth 2 (Two) Times a Day.   2024 at 2200    levothyroxine (SYNTHROID, LEVOTHROID) 150 MCG tablet Take 1 tablet by mouth Daily.   2024 at 0900    ondansetron (Zofran) 4 MG tablet Take 1 tablet by mouth Every 8 (Eight) Hours As Needed for Nausea or Vomiting. 15 tablet 0 2024 at 2200    oxyCODONE (ROXICODONE) 10 MG tablet Take 1 tablet by mouth Every 4 (Four) Hours As Needed.   2024 at 1500       Scheduled Meds:atorvastatin, 80 mg, Oral, Nightly  famotidine, 20 mg, Oral, BID  FLUoxetine, 40 mg, Oral, Daily  gabapentin, 100 mg, Oral, BID  levothyroxine, 150 mcg, Oral, Q AM  potassium chloride, 10 mEq, Intravenous, Once  senna-docusate sodium, 2 tablet, Oral, BID  sodium chloride, 10 mL, Intravenous,  "Q12H      Continuous Infusions:sodium chloride, 125 mL/hr, Last Rate: 125 mL/hr (02/05/24 2316)      PRN Meds:.  acetaminophen    senna-docusate sodium **AND** polyethylene glycol **AND** bisacodyl **AND** bisacodyl    Calcium Replacement - Follow Nurse / BPA Driven Protocol    cyclobenzaprine    HYDROmorphone    influenza vaccine    Magnesium Standard Dose Replacement - Follow Nurse / BPA Driven Protocol    melatonin    ondansetron    oxyCODONE    Phosphorus Replacement - Follow Nurse / BPA Driven Protocol    Potassium Replacement - Follow Nurse / BPA Driven Protocol    [COMPLETED] Insert Peripheral IV **AND** sodium chloride    sodium chloride    sodium chloride    ALLERGIES:  Duloxetine hcl, Erythromycin, Morphine, Sulfa antibiotics, and Trazodone    ROS:  The following systems were reviewed and negative;   Constitution:  No fevers, chills, no unintentional weight loss  Skin: no rash, no jaundice  Eyes:  No blurry vision, no eye pain  HENT:  No change in hearing or smell  Resp:  No dyspnea or cough  CV:  No chest pain or palpitations  :  No dysuria, hematuria  Musculoskeletal:  No leg cramps or arthralgias  Neuro:  No tremor, no numbness  Psych:  No depression or confusion    Objective     Vital Signs:   Vitals:    02/05/24 2045 02/05/24 2213 02/06/24 0240 02/06/24 0546   BP: 122/74 146/86  114/60   BP Location: Right arm Right arm  Right arm   Patient Position: Lying Lying  Lying   Pulse: 66 74 72 56   Resp: 20 20 18 16   Temp:  97.3 °F (36.3 °C)  97.1 °F (36.2 °C)   TempSrc:  Temporal  Temporal   SpO2: 96% 96% 98% 100%   Weight:  64 kg (141 lb 1.5 oz)     Height:  165.1 cm (65\")         Physical Exam:       General Appearance:    Awake and alert, in no acute distress   Head:    Normocephalic, without obvious abnormality, atraumatic   Throat:   No oral lesions, no thrush, oral mucosa moist   Lungs:     Respirations regular, even and unlabored   Chest Wall:    No abnormalities observed   Abdomen:     Soft, " "generalized tenderness, no rebound or guarding, non-distended   Rectal:     Deferred   Extremities:   Moves all extremities, no edema, no cyanosis   Pulses:   Pulses palpable and equal bilaterally   Skin:   No rash, no jaundice, normal palpation   Lymph nodes:   No cervical, supraclavicular or submandibular palpable adenopathy   Neurologic:   Cranial nerves 2 - 12 grossly intact, no asterixis       Results Review:   I reviewed the patient's labs and imaging.  CBC    Results from last 7 days   Lab Units 02/06/24  0413 02/05/24  1613   WBC 10*3/mm3 5.90 6.10   HEMOGLOBIN g/dL 12.7 14.6   PLATELETS 10*3/mm3 257 331     CMP   Results from last 7 days   Lab Units 02/06/24  0413 02/05/24  1613   SODIUM mmol/L 138 137   POTASSIUM mmol/L 2.6* 2.4*   CHLORIDE mmol/L 104 101   CO2 mmol/L 22.0 20.0*   BUN mg/dL 11 15   CREATININE mg/dL 0.82 0.92   GLUCOSE mg/dL 93 103*   ALBUMIN g/dL  --  4.5   BILIRUBIN mg/dL  --  0.4   ALK PHOS U/L  --  116   AST (SGOT) U/L  --  21   ALT (SGPT) U/L  --  24   MAGNESIUM mg/dL  --  1.9   LIPASE U/L  --  87*     Cr Clearance Estimated Creatinine Clearance: 69.1 mL/min (by C-G formula based on SCr of 0.82 mg/dL).  Coag     HbA1C No results found for: \"HGBA1C\"  Blood Glucose No results found for: \"POCGLU\"  Infection     UA    Results from last 7 days   Lab Units 02/05/24 2036   NITRITE UA  Negative   WBC UA /HPF 6-10*   BACTERIA UA /HPF None Seen   SQUAM EPITHEL UA /HPF 0-2     Imaging Results (Last 72 Hours)       Procedure Component Value Units Date/Time    CT Abdomen Pelvis With Contrast [[id number]] Collected: 02/05/24 1810     Updated: 02/05/24 1830    Narrative:      CT ABDOMEN PELVIS W CONTRAST    Date of Exam: 2/5/2024 4:43 PM CST    Indication: Severe diffuse abdominal pain vomiting diarrhea previous cholecystectomy appendectomy and perforated bowel.    Comparison: 5/27/2023    Technique: Axial CT images were obtained of the abdomen and pelvis following the uneventful intravenous " administration of 100 cc Isovue-370. Sagittal and coronal reconstructions were performed.  Automated exposure control and iterative reconstruction   methods were used.        Findings:  LUNG BASES: Minimal basal atelectasis.    LIVER:  Unremarkable parenchyma without focal lesion.  BILIARY/GALLBLADDER: Cholecystectomy  SPLEEN:  Unremarkable  PANCREAS:  Unremarkable  ADRENAL: There is similar nodular thickening of the body of the left adrenal gland measuring up to 1.1 cm in transverse diameter.  KIDNEYS:  Unremarkable parenchyma with no solid mass identified. No obstruction.  There is an 11 mm nonobstructive lower pole right renal calculus.  GASTROINTESTINAL/MESENTERY: Diffuse fluid-filled small and large intestine without transition point to suggest obstruction. Imaging features are most suggestive of diarrheal state. No mural thickening to suggest inflammation although early enterocolitis   is a consideration.  MESENTERIC VESSELS:  Patent.  AORTA/IVC:  Normal caliber.    RETROPERITONEUM/LYMPH NODES:  Unremarkable    REPRODUCTIVE:  Unremarkable  BLADDER:  Unremarkable    OSSEUS STRUCTURES: No acute process identified..        Impression:      Impression:  1.Fluid-filled colon and to a lesser degree small intestine suggestive of diarrheal state and the potential for early enterocolitis.            Electronically Signed: Griffin Morales MD    2/5/2024 5:23 PM CST    Workstation ID: [id number]            ASSESSMENT:  -Nausea/vomiting  -Diarrhea  -Abdominal pain  -Dyspepsia  -Hypokalemia  -Mildly elevated lipase  -Abnormal CT showing possible enterocolitis  -History of perforated gastric ulcer s/p partial gastrectomy  -Chronic back pain  -History of ischemic colitis  -History of cholecystectomy  -History of colon perforation in 2012 s/p colon resection    PLAN:  Patient is a 63-year-old female with history of perforated gastric ulcer s/p partial gastrectomy, cholecystectomy, and colon perforation in 2012 s/p colon resection  who presented on 2/5 with complaints of nausea/vomiting and diarrhea.    CT abdomen/pelvis W shows a fluid-filled colon and to a lesser degree small intestine suggestive of diarrheal state and the potential for early enterocolitis.  GI PCR pending.  Await results.  Will also check stool for C. difficile.  Add dicyclomine and Florastor.  Can consider adding antidiarrheals if stool studies are negative.  Hypokalemia noted.  Potassium replacement protocol is in process.  If stool studies are negative, could consider EGD and/or colonoscopy tomorrow.  Continue clear liquid diet.  Antiemetics/analgesics as needed.  Supportive care.      I discussed the patients findings and my recommendations with the patient.  I will discuss the case with Dr. Macdonald and change plan accordingly.    We appreciate the referral.    Electronically signed by LAMINE Delgado, 02/06/24, 9:31 AM EST.

## 2024-02-06 NOTE — CASE MANAGEMENT/SOCIAL WORK
Discharge Planning Assessment   [facility]     Patient Name: Kavita Garcia  MRN: [id number]  Today's Date: 2024    Admit Date: 2024    Plan: Return home   Discharge Needs Assessment       Row Name 24 1334       Living Environment    People in Home alone    Unique Family Situation patient's spouse  a few days ago.    Current Living Arrangements home    Potentially Unsafe Housing Conditions none    In the past 12 months has the electric, gas, oil, or water company threatened to shut off services in your home? No    Primary Care Provided by self    Provides Primary Care For no one    Family Caregiver if Needed child(augustine), adult;sibling(s)    Family Caregiver Names Sister- Almaz. Daughter- Jackelin    Quality of Family Relationships helpful;involved;supportive    Able to Return to Prior Arrangements yes       Resource/Environmental Concerns    Resource/Environmental Concerns none    Transportation Concerns none       Transportation Needs    In the past 12 months, has lack of transportation kept you from medical appointments or from getting medications? no    In the past 12 months, has lack of transportation kept you from meetings, work, or from getting things needed for daily living? No       Food Insecurity    Within the past 12 months, you worried that your food would run out before you got the money to buy more. Never true    Within the past 12 months, the food you bought just didn't last and you didn't have money to get more. Never true       Transition Planning    Patient/Family Anticipates Transition to home    Patient/Family Anticipated Services at Transition none    Transportation Anticipated car, drives self;family or friend will provide       Discharge Needs Assessment    Readmission Within the Last 30 Days no previous admission in last 30 days    Equipment Currently Used at Home none    Concerns to be Addressed denies needs/concerns at this time;grief and loss;coping/stress    Anticipated  Changes Related to Illness none    Equipment Needed After Discharge none                   Discharge Plan       Row Name 02/06/24 6607       Plan    Plan Return home    Plan Comments CM met with patient and daughter at the bedside. Confirmed PCP, insurance, and pharmacy. Patient is established with [facility] Patient denies any difficulty affording medications. Patient is not current with any HHC/OPPT/OT services. Patient lives at home alone, spouse passed away very recently. IADLS and does not drive- family or [employer] provide transportation. CM added [employer] 3# to AVS. DC Barriers: GI following, clear liquids, K+ 2.6, IV dilaudid for pain.                  Continued Care and Services - Admitted Since 2/5/2024    Coordination has not been started for this encounter.       Expected Discharge Date and Time       Expected Discharge Date Expected Discharge Time    Feb 8, 2024            Demographic Summary       Row Name 02/06/24 1334       General Information    Admission Type observation    Arrived From emergency department    Referral Source admission list    Reason for Consult discharge planning    Preferred Language English       Contact Information    Permission Granted to Share Info With     Contact Information Obtained for                    Functional Status       Row Name 02/06/24 1334       Functional Status    Usual Activity Tolerance moderate    Current Activity Tolerance moderate       Functional Status, IADL    Medications independent    Meal Preparation independent    Housekeeping independent    Laundry independent    Shopping independent       Mental Status    General Appearance WDL WDL       Mental Status Summary    Recent Changes in Mental Status/Cognitive Functioning no changes           Carlos Gonzalez RN     Cell number [phone]  Office number [phone]

## 2024-02-06 NOTE — H&P
"[facility] H&P    Patient Name: Kavita Garcia  : 1960  MRN: [id number]  Primary Care Physician: Provider, No Known  Date of admission: 2024     Patient Care Team:  Provider, No Known as PCP - General          Subjective   History Present Illness     Chief Complaint:   Chief Complaint   Patient presents with    Abdominal Pain     Abdominal pain with nausea, vomiting and diarrhea    History of Present Illness  Obtained from admitting physician HPI on 2024:  History of present illness/63-year-old female with several health troubles with complaint 3-day history of nausea vomiting and diarrhea abdominal cramping and unable to hold anything down.  She denies any fever or chills.  No urinary complaints no new exposures foreign travels or antibiotic use or recent hospitalization.  Nothing seem to trigger nothing make it better or worse.  Patient concerned as she has had a previous bowel perforation requiring surgery in the past.  No black or bloody stool or bloody vomitus.     24:  Patient confirms the HPI noted above including approximately 3 to 4-day history of worsening abdominal pain located centrally in her abdomen radiating out described as a intermittent sharp with a constant cramping type pain made worse with any p.o. intake including liquids.  She has had multiple episodes of nausea and nonbloody vomiting and notes nonbloody diarrhea.  She denies any fever, dyspnea, known sick contacts or unusual p.o. intake.  She does not smoke or drink alcohol, confirms compliance with all of her outpatient medical therapies and reports no recent antibiotic regimens.  She does have a fairly significant history of abdominal surgeries including previous colectomy following a perforated colon and cholecystectomy.  She does also report that she has a history of \"ulcers\".        ROS  Review of Systems   Constitutional: Negative.   HENT: Negative.     Eyes: Negative.    Cardiovascular: Negative.  "   Respiratory: Negative.     Skin: Negative.    Musculoskeletal: Negative.    Gastrointestinal:  Positive for abdominal pain, diarrhea, nausea and vomiting.   Genitourinary: Negative.    Neurological: Negative.    Psychiatric/Behavioral: Negative.         Personal History     Past Medical History:   Past Medical History:   Diagnosis Date    Arthritis     Colon perforation 2012    Depression     GERD     Hypothyroidism     MAMMO     Neuropathy     Hands/ Feet    Osteoporosis     PAP     Rheumatoid aortitis     Scoliosis 2010    Stomach ulcer 2012       Surgical History:      Past Surgical History:   Procedure Laterality Date    BACK SURGERY  [id number]    6 surgeries    COLON SURGERY      Colon Resection    COLONOSCOPY             Family History: family history includes Alcohol abuse in her father; Arthritis in her mother; Cancer in her maternal aunt; Cancer (age of onset: 58) in her mother; Hyperlipidemia in her mother; Hypertension in her mother; Liver disease in her father; Osteoporosis in her mother; Stroke in her mother; Thyroid disease in her mother and sister. Otherwise pertinent FHx was reviewed and unremarkable.     Social History:  reports that she quit smoking about 14 years ago. Her smoking use included cigarettes. She has a 12.50 pack-year smoking history. She has never used smokeless tobacco. She reports that she does not currently use drugs. She reports that she does not drink alcohol.      Medications:  Prior to Admission medications    Medication Sig Start Date End Date Taking? Authorizing Provider   atorvastatin (LIPITOR) 80 MG tablet Take 1 tablet by mouth Every Night. 10/17/20  Yes ProviderThomas MD   cyclobenzaprine (FLEXERIL) 5 MG tablet Take 1 tablet by mouth 2 (Two) Times a Day As Needed for Muscle Spasms.   Yes ProviderThomas MD   famotidine (PEPCID) 20 MG tablet Take 1 tablet by mouth 2 (Two) Times a Day.   Yes ProviderThomas MD   FLUoxetine (PROzac) 40 MG capsule Take 1  capsule by mouth Daily.   Yes ProviderThomas MD   gabapentin (NEURONTIN) 100 MG capsule Take 1 capsule by mouth 2 (Two) Times a Day.   Yes Thomas Pierre MD   levothyroxine (SYNTHROID, LEVOTHROID) 150 MCG tablet Take 1 tablet by mouth Daily.   Yes Thomas Pierre MD   ondansetron (Zofran) 4 MG tablet Take 1 tablet by mouth Every 8 (Eight) Hours As Needed for Nausea or Vomiting. 5/29/23  Yes Ashanti Ramirez DO   oxyCODONE (ROXICODONE) 10 MG tablet Take 1 tablet by mouth Every 4 (Four) Hours As Needed. 5/22/23  Yes Thomas Pierre MD       Allergies:    Allergies   Allergen Reactions    Duloxetine Hcl Itching    Erythromycin Hives    Morphine Hives    Sulfa Antibiotics Hives    Trazodone Other (See Comments)     NIGHTMARES       Objective   Objective     Vital Signs  Temp:  [97.1 °F (36.2 °C)-98 °F (36.7 °C)] 97.1 °F (36.2 °C)  Heart Rate:  [56-99] 56  Resp:  [16-20] 16  BP: (114-146)/(59-94) 114/60  SpO2:  [96 %-100 %] 100 %  on   ;   Device (Oxygen Therapy): room air  Body mass index is 23.48 kg/m².    Physical Exam  Physical Exam  Vitals reviewed.   Constitutional:       General: She is not in acute distress.     Appearance: Normal appearance. She is normal weight. She is not ill-appearing, toxic-appearing or diaphoretic.   HENT:      Head: Normocephalic.      Right Ear: External ear normal.      Left Ear: External ear normal.      Nose: Nose normal.      Mouth/Throat:      Mouth: Mucous membranes are moist.   Eyes:      Extraocular Movements: Extraocular movements intact.   Cardiovascular:      Rate and Rhythm: Normal rate and regular rhythm.      Pulses: Normal pulses.   Pulmonary:      Effort: Pulmonary effort is normal.      Breath sounds: Normal breath sounds.   Abdominal:      General: Bowel sounds are normal.      Palpations: Abdomen is soft.      Tenderness: There is abdominal tenderness.   Musculoskeletal:         General: Normal range of motion.      Cervical back: Normal range  of motion.      Right lower leg: No edema.      Left lower leg: No edema.   Skin:     General: Skin is warm and dry.      Capillary Refill: Capillary refill takes less than 2 seconds.   Neurological:      General: No focal deficit present.      Mental Status: She is alert.   Psychiatric:         Mood and Affect: Mood normal.         Behavior: Behavior normal.         Thought Content: Thought content normal.         Judgment: Judgment normal.     Results Review:  I have personally reviewed most recent lab results and radiology images and interpretations and agree with findings, most notably: CMP, CBC, UA, lactate and CT of abdomen and pelvis.    Results from last 7 days   Lab Units 02/06/24  0413   WBC 10*3/mm3 5.90   HEMOGLOBIN g/dL 12.7   HEMATOCRIT % 36.6   PLATELETS 10*3/mm3 257     Results from last 7 days   Lab Units 02/06/24  0413 02/05/24  1709 02/05/24  1613   SODIUM mmol/L 138  --  137   POTASSIUM mmol/L 2.6*  --  2.4*   CHLORIDE mmol/L 104  --  101   CO2 mmol/L 22.0  --  20.0*   BUN mg/dL 11  --  15   CREATININE mg/dL 0.82  --  0.92   GLUCOSE mg/dL 93  --  103*   CALCIUM mg/dL 9.1  --  9.9   ALK PHOS U/L  --   --  116   ALT (SGPT) U/L  --   --  24   AST (SGOT) U/L  --   --  21   LACTATE mmol/L  --  1.1  --      Estimated Creatinine Clearance: 69.1 mL/min (by C-G formula based on SCr of 0.82 mg/dL).  Brief Urine Lab Results  (Last result in the past 365 days)        Color   Clarity   Blood   Leuk Est   Nitrite   Protein   CREAT   Urine HCG        02/05/24 2036 Yellow   Cloudy   Small (1+)   Moderate (2+)   Negative   Negative                   Microbiology Results (last 10 days)       ** No results found for the last 240 hours. **            ECG/EMG Results (most recent)       None                    CT Abdomen Pelvis With Contrast    Result Date: 2/5/2024  Impression: 1.Fluid-filled colon and to a lesser degree small intestine suggestive of diarrheal state and the potential for early enterocolitis.  Electronically Signed: Griffin Morales MD  2/5/2024 5:23 PM CST  Workstation ID: [id number]       Estimated Creatinine Clearance: 69.1 mL/min (by C-G formula based on SCr of 0.82 mg/dL).    Assessment & Plan   Assessment/Plan       Active Hospital Problems    Diagnosis  POA    **Hypokalemia [E87.6]  Yes      Resolved Hospital Problems   No resolved problems to display.        Abdominal pain with nausea, vomiting and diarrhea        Lab Results   Component Value Date     WBC 5.90 02/06/2024     AST 21 02/05/2024     ALT 24 02/05/2024     ALKPHOS 116 02/05/2024     BILITOT 0.4 02/05/2024     LIPASE 87 (H) 02/05/2024   -Anion gap initially 16.0 with a serum CO2 of 20.0, trended to 12.0 and 22 respectively on the morning following admission  -Lactate: 1.1  -UA showed no bacteria with 6-20 WBCs, moderate leukocyte esterase, negative nitrites, 1+ blood and a specific gravity elevated at 1.066  -CT of abdomen and pelvis: Showed a fluid-filled colon and to a lesser degree small intestine suggesting diarrheal state and potentially early enterocolitis  -In the ED patient was given IV Dilaudid as well as a 1 L fluid bolus and Zofran  -GI consulted  -GI panel positive for Campylobacter  -Levaquin started  -Clear liquid diet  -Monitor BMP while admitted     Hypokalemia        Lab Results   Component Value Date     K 2.6 (C) 02/06/2024     MG 1.9 02/05/2024   -Potassium initially 2.4  -Electrolyte replacement protocol ordered  -Monitor potassium and magnesium     Hyperlipidemia  -Statin     Depression  -Fluoxetine     Hypothyroidism  -Levothyroxine          VTE Prophylaxis -   Mechanical Order History:        Ordered        02/05/24 2150  Place Sequential Compression Device  Once            02/05/24 2150  Maintain Sequential Compression Device  Continuous                          Pharmalogical Order History:       None            CODE STATUS:    Code Status and Medical Interventions:   Ordered at: 02/06/24 0642     Code Status  (Patient has no pulse and is not breathing):    CPR (Attempt to Resuscitate)     Medical Interventions (Patient has pulse or is breathing):    Full Support       This patient has been examined wearing personal protective equipment.     I discussed the patient's findings and my recommendations with patient and nursing staff.      Signature:Electronically signed by Jong Estrada PA-C, 02/06/24, 2:37 PM EST.

## 2024-02-06 NOTE — CASE MANAGEMENT/SOCIAL WORK
Social Work Assessment  [facility]     Patient Name: Kavita Garcia  MRN: [id number]  Today's Date: 2/6/2024    Admit Date: 2/5/2024     Discharge Plan       Row Name 02/06/24 7409       Plan    Plan Comments Sw met with Pt at bedside today to complete SDOH. Pt's youngest daughter was at bedside.  Pt reports that her spouse recently passes away a few days ago from a stroke. Pt now lives at home alone. Pt stated her spouse usually paid all the bills but her family will provide help. pt does not drive and reported that her spouse used to transport her. Pt is connected to [employer] and can reach out to them for transportation. Sw and Pt discussed Grief support. Sw added Grief support to AVS. Pt requested the  come visit with her.  consult was put in. No further needs at this time.        MARICEL Morris, MSW    Phone: [phone]  Fax: [phone]  Email: [email or web address]

## 2024-02-06 NOTE — SIGNIFICANT NOTE
02/06/24 7262   Living Situation   Current Living Arrangements home   Potentially Unsafe Housing Conditions none   Food Insecurity   Within the past 12 months, you worried that your food would run out before you got the money to buy more. Never true   Within the past 12 months, the food you bought just didn't last and you didn't have money to get more. Never true   Transportation Needs   In the past 12 months, has lack of transportation kept you from medical appointments or from getting medications? no   In the past 12 months, has lack of transportation kept you from meetings, work, or from getting things needed for daily living? No   Utilities   In the past 12 months has the electric, gas, oil, or water company threatened to shut off services in your home? No   Abuse Screen (yes response referral indicated)   Feels Unsafe at Home or Work/School no   Feels Threatened by Someone no   Does Anyone Try to Keep You From Having Contact with Others or Doing Things Outside Your Home? no   Physical Signs of Abuse Present no   Financial Resource Strain   How hard is it for you to pay for the very basics like food, housing, medical care, and heating? Not very   Employment   Do you want help finding or keeping work or a job? I do not need or want help   Family and Community Support   If for any reason you need help with day-to-day activities such as bathing, preparing meals, shopping, managing finances, etc., do you get the help you need? I get all the help I need   How often do you feel lonely or isolated from those around you? Patient unable to answer  (Pt's spouse recently passed away.)   Education   Preferred Language English   Do you want help with school or training? For example, starting or completing job training or getting a high school diploma, GED or equivalent No   Physical Activity   On average, how many days per week do you engage in moderate to strenuous exercise (like a brisk walk)? 0 days   On average, how  many minutes do you engage in exercise at this level? 0 min   Number of minutes of exercise per week (!) 0   Alcohol Use   Q1: How often do you have a drink containing alcohol? Never   Q2: How many drinks containing alcohol do you have on a typical day when you are drinking? None   Q3: How often do you have six or more drinks on one occasion? Never   Stress   Do you feel stress - tense, restless, nervous, or anxious, or unable to sleep at night because your mind is troubled all the time - these days? To some exte   Mental Health   Little Interest or Pleasure in Doing Things 99-->patient declined   Feeling Down, Depressed or Hopeless 99-->patient declined  (Pt's spouse passed away.)   Disabilities   Concentrating, Remembering or Making Decisions Difficulty no   Doing Errands Independently Difficulty (such as shopping) no

## 2024-02-06 NOTE — PLAN OF CARE
Goal Outcome Evaluation:  Plan of Care Reviewed With: patient           Outcome Evaluation: 64 y/o female brought in hospital on 2/5 due to abd pain with n/v/d x 3 days, found to have hypokalemia. PMH includes colon perforation in past with similar symptoms, osteoporosis, scoliosis, RA, neuropathy, multiple back surgeries. Patient lost her spouse unexpectedly very recently who was her primary caregiver. Patient uses rw or cane for mobility and spouse assists with ADL's and most of household tasks due to patient's chronic back issues.Patient uses wc for longer distance mobility.  At time of eval, patient is modified independent with bed mobility and transfers using cane. Patient ambulated 20 ft using cane with SBA; gait slow with marked flexed posture and fatigue at end. Patient appears to be at her baseline for mobility , limited by gen weakness but anticipate improvement when medically stable.      Anticipated Discharge Disposition (PT): home with assist, assisted living

## 2024-02-06 NOTE — PLAN OF CARE
Goal Outcome Evaluation:  Plan of Care Reviewed With: patient           Outcome Evaluation: Pt continue to have complaint so abdominal pain this shift. Pt also has chronic back pain. Pt has been tearful at times due to loss of . Pt is pleasant and cooperative with care. She has had multiple episodes of diarreha this shift. Will continue to monitor.

## 2024-02-06 NOTE — ED PROVIDER NOTES
Subjective   History of Present Illness  Complaint abdominal pain nausea vomiting diarrhea    History of present illness/63-year-old female with several health troubles with complaint 3-day history of nausea vomiting and diarrhea abdominal cramping and unable to hold anything down.  She denies any fever or chills.  No urinary complaints no new exposures foreign travels or antibiotic use or recent hospitalization.  Nothing seem to trigger nothing make it better or worse.  Patient concerned as she has had a previous bowel perforation requiring surgery in the past.  No black or bloody stool or bloody vomitus.      Review of Systems   Constitutional:  Negative for chills and fever.   Respiratory:  Negative for chest tightness and shortness of breath.    Cardiovascular:  Negative for chest pain and palpitations.   Gastrointestinal:  Positive for abdominal pain, diarrhea and vomiting.   Genitourinary:  Negative for difficulty urinating and dysuria.   Musculoskeletal:  Negative for back pain and neck pain.   Skin:  Negative for rash and wound.   Neurological:  Positive for weakness. Negative for dizziness and light-headedness.   Psychiatric/Behavioral:  Negative for confusion.        Past Medical History:   Diagnosis Date    Arthritis     Colon perforation 2012    Depression     GERD     Hypothyroidism     MAMMO     Neuropathy     Hands/ Feet    Osteoporosis     PAP     Rheumatoid aortitis     Scoliosis 2010    Stomach ulcer 2012       Allergies   Allergen Reactions    Duloxetine Hcl Itching    Erythromycin Hives    Morphine Hives    Sulfa Antibiotics Hives    Trazodone Other (See Comments)     NIGHTMARES       Past Surgical History:   Procedure Laterality Date    BACK SURGERY  [id number]    6 surgeries    COLON SURGERY      Colon Resection    COLONOSCOPY         Family History   Problem Relation Age of Onset    Arthritis Mother     Cancer Mother 58        Colon Cancer    Osteoporosis Mother     Hypertension Mother      Stroke Mother     Hyperlipidemia Mother     Thyroid disease Mother     Liver disease Father     Alcohol abuse Father     Thyroid disease Sister     Cancer Maternal Aunt         lung       Social History     Socioeconomic History    Marital status:    Tobacco Use    Smoking status: Former     Packs/day: 0.50     Years: 25.00     Additional pack years: 0.00     Total pack years: 12.50     Types: Cigarettes     Quit date:      Years since quittin.1    Smokeless tobacco: Never   Vaping Use    Vaping Use: Never used   Substance and Sexual Activity    Alcohol use: Never    Drug use: Not Currently     Comment: Marijuana ---- occasionally    Sexual activity: Not Currently     Prior to Admission medications    Medication Sig Start Date End Date Taking? Authorizing Provider   atorvastatin (LIPITOR) 80 MG tablet Take 1 tablet by mouth Every Night. 10/17/20  Yes Thomas Pierre MD   cyclobenzaprine (FLEXERIL) 5 MG tablet Take 1 tablet by mouth 2 (Two) Times a Day As Needed for Muscle Spasms.   Yes Thomas Pierre MD   famotidine (PEPCID) 20 MG tablet Take 1 tablet by mouth 2 (Two) Times a Day.   Yes Thomas Pierre MD   FLUoxetine (PROzac) 40 MG capsule Take 1 capsule by mouth Daily.   Yes Thomas Pierre MD   gabapentin (NEURONTIN) 100 MG capsule Take 1 capsule by mouth 2 (Two) Times a Day.   Yes Thomas Pierre MD   levothyroxine (SYNTHROID, LEVOTHROID) 150 MCG tablet Take 1 tablet by mouth Daily.   Yes Thomas Pierre MD   ondansetron (Zofran) 4 MG tablet Take 1 tablet by mouth Every 8 (Eight) Hours As Needed for Nausea or Vomiting. 23  Yes Ashanti Ramirez DO   oxyCODONE (ROXICODONE) 10 MG tablet Take 1 tablet by mouth Every 4 (Four) Hours As Needed. 23  Yes Thomas Pierre MD          Objective   Physical Exam  Constitutional this is a 63-year-old female awake alert no acute distress triage vital signs reviewed.  HEENT extraocular muscles are intact pupils  equal round reactive sclera clear neck supple no adenopathy no JVD no bruits no meningeal signs mouth clear lungs clear no retractions heart regular without murmur no CVA tenderness abdomen soft mild diffuse tenderness but no rebound no guarding good bowel sounds no peritoneal findings or pulsatile masses extremities pulses equal upper and lower extremities no edema no cords no Homans' sign no evidence of DVT skin warm and dry without rashes neurologic awake alert and follows commands motor strength normal without focal weakness  Procedures           ED Course      Results for orders placed or performed during the hospital encounter of 02/05/24   Comprehensive Metabolic Panel    Specimen: Arm, Left; Blood   Result Value Ref Range    Glucose 103 (H) 65 - 99 mg/dL    BUN 15 8 - 23 mg/dL    Creatinine 0.92 0.57 - 1.00 mg/dL    Sodium 137 136 - 145 mmol/L    Potassium 2.4 (C) 3.5 - 5.2 mmol/L    Chloride 101 98 - 107 mmol/L    CO2 20.0 (L) 22.0 - 29.0 mmol/L    Calcium 9.9 8.6 - 10.5 mg/dL    Total Protein 8.1 6.0 - 8.5 g/dL    Albumin 4.5 3.5 - 5.2 g/dL    ALT (SGPT) 24 1 - 33 U/L    AST (SGOT) 21 1 - 32 U/L    Alkaline Phosphatase 116 39 - 117 U/L    Total Bilirubin 0.4 0.0 - 1.2 mg/dL    Globulin 3.6 gm/dL    A/G Ratio 1.3 g/dL    BUN/Creatinine Ratio 16.3 7.0 - 25.0    Anion Gap 16.0 (H) 5.0 - 15.0 mmol/L    eGFR 69.2 >60.0 mL/min/1.73   Urinalysis With Microscopic If Indicated (No Culture) - Urine, Clean Catch    Specimen: Urine, Clean Catch   Result Value Ref Range    Color, UA Yellow Yellow, Straw    Appearance, UA Cloudy (A) Clear    pH, UA 6.5 5.0 - 8.0    Specific Gravity, UA 1.066 (H) 1.005 - 1.030    Glucose, UA Negative Negative    Ketones, UA Negative Negative    Bilirubin, UA Negative Negative    Blood, UA Small (1+) (A) Negative    Protein, UA Negative Negative    Leuk Esterase, UA Moderate (2+) (A) Negative    Nitrite, UA Negative Negative    Urobilinogen, UA 0.2 E.U./dL 0.2 - 1.0 E.U./dL   Lipase     Specimen: Arm, Left; Blood   Result Value Ref Range    Lipase 87 (H) 13 - 60 U/L   CBC Auto Differential    Specimen: Arm, Left; Blood   Result Value Ref Range    WBC 6.10 3.40 - 10.80 10*3/mm3    RBC 4.66 3.77 - 5.28 10*6/mm3    Hemoglobin 14.6 12.0 - 15.9 g/dL    Hematocrit 42.3 34.0 - 46.6 %    MCV 90.7 79.0 - 97.0 fL    MCH 31.3 26.6 - 33.0 pg    MCHC 34.5 31.5 - 35.7 g/dL    RDW 12.9 12.3 - 15.4 %    RDW-SD 40.7 37.0 - 54.0 fl    MPV 9.8 6.0 - 12.0 fL    Platelets 331 140 - 450 10*3/mm3    Neutrophil % 44.8 42.7 - 76.0 %    Lymphocyte % 37.7 19.6 - 45.3 %    Monocyte % 13.9 (H) 5.0 - 12.0 %    Eosinophil % 3.0 0.3 - 6.2 %    Basophil % 0.6 0.0 - 1.5 %    Neutrophils, Absolute 2.80 1.70 - 7.00 10*3/mm3    Lymphocytes, Absolute 2.30 0.70 - 3.10 10*3/mm3    Monocytes, Absolute 0.90 0.10 - 0.90 10*3/mm3    Eosinophils, Absolute 0.20 0.00 - 0.40 10*3/mm3    Basophils, Absolute 0.00 0.00 - 0.20 10*3/mm3    nRBC 0.1 0.0 - 0.2 /100 WBC   Magnesium    Specimen: Arm, Left; Blood   Result Value Ref Range    Magnesium 1.9 1.6 - 2.4 mg/dL   Urinalysis, Microscopic Only - Urine, Clean Catch    Specimen: Urine, Clean Catch   Result Value Ref Range    RBC, UA 0-2 None Seen, 0-2 /HPF    WBC, UA 6-10 (A) None Seen, 0-2 /HPF    Bacteria, UA None Seen None Seen /HPF    Squamous Epithelial Cells, UA 0-2 None Seen, 0-2 /HPF    Hyaline Casts, UA None Seen None Seen /LPF    Methodology Manual Light Microscopy    POC Lactate    Specimen: Blood   Result Value Ref Range    Lactate 1.1 0.3 - 2.0 mmol/L   Lavender Top   Result Value Ref Range    Extra Tube hold for add-on    Gold Top - SST   Result Value Ref Range    Extra Tube Hold for add-ons.    Light Blue Top   Result Value Ref Range    Extra Tube Hold for add-ons.      CT Abdomen Pelvis With Contrast    Result Date: 2/5/2024  Impression: 1.Fluid-filled colon and to a lesser degree small intestine suggestive of diarrheal state and the potential for early enterocolitis. Electronically  Signed: Griffin Morales MD  2/5/2024 5:23 PM CST  Workstation ID: [id number]   Medications   sodium chloride 0.9 % flush 10 mL (has no administration in time range)   potassium chloride 10 mEq in 100 mL IVPB (10 mEq Intravenous New Bag 2/5/24 2036)     And   potassium chloride 10 mEq in 100 mL IVPB (10 mEq Intravenous New Bag 2/5/24 2316)     And   potassium chloride 10 mEq in 100 mL IVPB (has no administration in time range)     And   potassium chloride 10 mEq in 100 mL IVPB (has no administration in time range)     And   potassium chloride 10 mEq in 100 mL IVPB (has no administration in time range)     And   potassium chloride 10 mEq in 100 mL IVPB (has no administration in time range)   sodium chloride 0.9 % flush 10 mL (has no administration in time range)   sodium chloride 0.9 % flush 10 mL (has no administration in time range)   sodium chloride 0.9 % infusion 40 mL (has no administration in time range)   acetaminophen (TYLENOL) tablet 650 mg (has no administration in time range)   sennosides-docusate (PERICOLACE) 8.6-50 MG per tablet 2 tablet (has no administration in time range)     And   polyethylene glycol (MIRALAX) packet 17 g (has no administration in time range)     And   bisacodyl (DULCOLAX) EC tablet 5 mg (has no administration in time range)     And   bisacodyl (DULCOLAX) suppository 10 mg (has no administration in time range)   ondansetron (ZOFRAN) injection 4 mg (4 mg Intravenous Given 2/5/24 2315)   melatonin tablet 5 mg (has no administration in time range)   sodium chloride 0.9 % infusion (125 mL/hr Intravenous New Bag 2/5/24 2316)   HYDROmorphone (DILAUDID) injection 0.5 mg (0.5 mg Intravenous Given 2/5/24 2315)   Influenza Vac High-Dose Quad (FLUZONE HIGH DOSE) injection 0.7 mL (has no administration in time range)   lactated ringers bolus 1,000 mL (0 mL Intravenous Stopped 2/5/24 2019)   ondansetron (ZOFRAN) injection 4 mg (4 mg Intravenous Given 2/5/24 1641)   HYDROmorphone (DILAUDID) injection  0.5 mg (0.5 mg Intravenous Given 2/5/24 1640)   iopamidol (ISOVUE-370) 76 % injection 100 mL (100 mL Intravenous Given 2/5/24 1800)                                              Medical Decision Making  Medical decision making.  IV established monitor placement review of sinus rhythm.  Patient was given 1 L lactated Ringer's Dilaudid 0.5 mg IV and Zofran 4 mg IV.  Labs obtained independent reviewed by me comprehensive metabolic profile unremarkable other than a CO2 of 20 potassium 3.4 liver lipase unremarkable CBC normal urine negative other than 6-10 white cells.  Lactate 1.1.  Patient underwent a CT abdomen pelvis my my independent review I see no evidence perforation I do not see evidence of bowel obstruction or acute pancreatitis no evidence of ischemic bowel or any type cholecystitis or appendicitis radiology review fluid-filled colon and to a lesser degree small testing suggesting a diarrheal state.  The patient repeat exam resting comfortably had no further vomiting the abdomen soft nontender good bowel sounds no peritoneal findings or pulsatile masses.  She was started on hypokalemia protocol with IV potassium since she has been vomiting.  I do not see evidence here on clinical exam of bowel obstruction ischemic bowel pancreatitis appendicitis acute cholecystitis aneurysms or dissections although not a complete list of all possibilities.  We placed observation for IV hydration potassium replacement stable otherwise unremarkable ER course patient agreeable    Problems Addressed:  Generalized abdominal pain: complicated acute illness or injury  Hypokalemia: complicated acute illness or injury  Vomiting and diarrhea: complicated acute illness or injury    Amount and/or Complexity of Data Reviewed  Labs: ordered. Decision-making details documented in ED Course.  Radiology: ordered and independent interpretation performed. Decision-making details documented in ED Course.    Risk  Parenteral controlled  substances.  Decision regarding hospitalization.        Final diagnoses:   Vomiting and diarrhea   Generalized abdominal pain   Hypokalemia       ED Disposition  ED Disposition       ED Disposition   Decision to Admit    Condition   --    Comment   --               No follow-up provider specified.       Medication List        ASK your doctor about these medications      famotidine 20 MG tablet  Commonly known as: PEPCID  Ask about: Which instructions should I use?     levothyroxine 150 MCG tablet  Commonly known as: SYNTHROID, LEVOTHROID  Ask about: Which instructions should I use?     PROzac 40 MG capsule  Generic drug: FLUoxetine  Ask about: Which instructions should I use?                 Jasiel Diop MD  02/06/24 0016

## 2024-02-07 VITALS
SYSTOLIC BLOOD PRESSURE: 122 MMHG | TEMPERATURE: 98 F | WEIGHT: 141.09 LBS | OXYGEN SATURATION: 97 % | HEIGHT: 65 IN | DIASTOLIC BLOOD PRESSURE: 80 MMHG | HEART RATE: 80 BPM | BODY MASS INDEX: 23.51 KG/M2 | RESPIRATION RATE: 18 BRPM

## 2024-02-07 LAB
ALBUMIN SERPL-MCNC: 3.5 G/DL (ref 3.5–5.2)
ALBUMIN/GLOB SERPL: 1.5 G/DL
ALP SERPL-CCNC: 87 U/L (ref 39–117)
ALT SERPL W P-5'-P-CCNC: 14 U/L (ref 1–33)
ANION GAP SERPL CALCULATED.3IONS-SCNC: 8 MMOL/L (ref 5–15)
AST SERPL-CCNC: 16 U/L (ref 1–32)
BASOPHILS # BLD AUTO: 0 10*3/MM3 (ref 0–0.2)
BASOPHILS NFR BLD AUTO: 0.5 % (ref 0–1.5)
BILIRUB SERPL-MCNC: 0.2 MG/DL (ref 0–1.2)
BUN SERPL-MCNC: 5 MG/DL (ref 8–23)
BUN/CREAT SERPL: 6.3 (ref 7–25)
C DIFF GDH + TOXINS A+B STL QL IA.RAPID: NEGATIVE
C DIFF GDH + TOXINS A+B STL QL IA.RAPID: NEGATIVE
CALCIUM SPEC-SCNC: 9 MG/DL (ref 8.6–10.5)
CHLORIDE SERPL-SCNC: 113 MMOL/L (ref 98–107)
CO2 SERPL-SCNC: 20 MMOL/L (ref 22–29)
CREAT SERPL-MCNC: 0.8 MG/DL (ref 0.57–1)
DEPRECATED RDW RBC AUTO: 41.1 FL (ref 37–54)
EGFRCR SERPLBLD CKD-EPI 2021: 81.9 ML/MIN/1.73
EOSINOPHIL # BLD AUTO: 0.2 10*3/MM3 (ref 0–0.4)
EOSINOPHIL NFR BLD AUTO: 3.8 % (ref 0.3–6.2)
ERYTHROCYTE [DISTWIDTH] IN BLOOD BY AUTOMATED COUNT: 12.9 % (ref 12.3–15.4)
GLOBULIN UR ELPH-MCNC: 2.4 GM/DL
GLUCOSE SERPL-MCNC: 97 MG/DL (ref 65–99)
HCT VFR BLD AUTO: 34.2 % (ref 34–46.6)
HGB BLD-MCNC: 11.7 G/DL (ref 12–15.9)
LYMPHOCYTES # BLD AUTO: 1.9 10*3/MM3 (ref 0.7–3.1)
LYMPHOCYTES NFR BLD AUTO: 36.4 % (ref 19.6–45.3)
MCH RBC QN AUTO: 31.4 PG (ref 26.6–33)
MCHC RBC AUTO-ENTMCNC: 34.2 G/DL (ref 31.5–35.7)
MCV RBC AUTO: 92 FL (ref 79–97)
MONOCYTES # BLD AUTO: 0.6 10*3/MM3 (ref 0.1–0.9)
MONOCYTES NFR BLD AUTO: 11.1 % (ref 5–12)
NEUTROPHILS NFR BLD AUTO: 2.5 10*3/MM3 (ref 1.7–7)
NEUTROPHILS NFR BLD AUTO: 48.2 % (ref 42.7–76)
NRBC BLD AUTO-RTO: 0.1 /100 WBC (ref 0–0.2)
PLATELET # BLD AUTO: 225 10*3/MM3 (ref 140–450)
PMV BLD AUTO: 9.7 FL (ref 6–12)
POTASSIUM SERPL-SCNC: 3.8 MMOL/L (ref 3.5–5.2)
PROT SERPL-MCNC: 5.9 G/DL (ref 6–8.5)
RBC # BLD AUTO: 3.72 10*6/MM3 (ref 3.77–5.28)
SODIUM SERPL-SCNC: 141 MMOL/L (ref 136–145)
WBC NRBC COR # BLD AUTO: 5.2 10*3/MM3 (ref 3.4–10.8)

## 2024-02-07 PROCEDURE — 25010000002 HYDROMORPHONE 1 MG/ML SOLUTION: Performed by: PHYSICIAN ASSISTANT

## 2024-02-07 PROCEDURE — 80053 COMPREHEN METABOLIC PANEL: CPT | Performed by: NURSE PRACTITIONER

## 2024-02-07 PROCEDURE — 25810000003 SODIUM CHLORIDE 0.9 % SOLUTION: Performed by: EMERGENCY MEDICINE

## 2024-02-07 PROCEDURE — 85025 COMPLETE CBC W/AUTO DIFF WBC: CPT | Performed by: PHYSICIAN ASSISTANT

## 2024-02-07 PROCEDURE — G0378 HOSPITAL OBSERVATION PER HR: HCPCS

## 2024-02-07 PROCEDURE — 96376 TX/PRO/DX INJ SAME DRUG ADON: CPT

## 2024-02-07 RX ORDER — LEVOFLOXACIN 750 MG/1
750 TABLET, FILM COATED ORAL EVERY 24 HOURS
Qty: 4 TABLET | Refills: 0 | Status: CANCELLED | OUTPATIENT
Start: 2024-02-07 | End: 2024-02-11

## 2024-02-07 RX ORDER — ONDANSETRON 4 MG/1
4 TABLET, ORALLY DISINTEGRATING ORAL EVERY 8 HOURS PRN
Qty: 12 TABLET | Refills: 0 | Status: SHIPPED | OUTPATIENT
Start: 2024-02-07

## 2024-02-07 RX ORDER — SACCHAROMYCES BOULARDII 250 MG
250 CAPSULE ORAL 2 TIMES DAILY
Status: DISCONTINUED | OUTPATIENT
Start: 2024-02-07 | End: 2024-02-07 | Stop reason: SDUPTHER

## 2024-02-07 RX ORDER — LEVOFLOXACIN 750 MG/1
750 TABLET, FILM COATED ORAL EVERY 24 HOURS
Qty: 3 TABLET | Refills: 0 | Status: SHIPPED | OUTPATIENT
Start: 2024-02-08 | End: 2024-02-11

## 2024-02-07 RX ORDER — DICYCLOMINE HYDROCHLORIDE 10 MG/1
20 CAPSULE ORAL 4 TIMES DAILY
Qty: 240 CAPSULE | Refills: 0 | Status: SHIPPED | OUTPATIENT
Start: 2024-02-07 | End: 2024-03-08

## 2024-02-07 RX ORDER — LOPERAMIDE HYDROCHLORIDE 2 MG/1
2 CAPSULE ORAL 4 TIMES DAILY PRN
Status: DISCONTINUED | OUTPATIENT
Start: 2024-02-07 | End: 2024-02-07 | Stop reason: HOSPADM

## 2024-02-07 RX ADMIN — LEVOFLOXACIN 750 MG: 750 TABLET, FILM COATED ORAL at 14:29

## 2024-02-07 RX ADMIN — OXYCODONE HYDROCHLORIDE 10 MG: 5 TABLET ORAL at 13:50

## 2024-02-07 RX ADMIN — DICYCLOMINE HYDROCHLORIDE 20 MG: 10 CAPSULE ORAL at 11:52

## 2024-02-07 RX ADMIN — OXYCODONE HYDROCHLORIDE 10 MG: 5 TABLET ORAL at 09:15

## 2024-02-07 RX ADMIN — SODIUM CHLORIDE 125 ML/HR: 9 INJECTION, SOLUTION INTRAVENOUS at 10:37

## 2024-02-07 RX ADMIN — Medication 250 MG: at 08:52

## 2024-02-07 RX ADMIN — Medication 10 ML: at 09:16

## 2024-02-07 RX ADMIN — SODIUM CHLORIDE 125 ML/HR: 9 INJECTION, SOLUTION INTRAVENOUS at 02:29

## 2024-02-07 RX ADMIN — DICYCLOMINE HYDROCHLORIDE 20 MG: 10 CAPSULE ORAL at 07:45

## 2024-02-07 RX ADMIN — LOPERAMIDE HYDROCHLORIDE 2 MG: 2 CAPSULE ORAL at 09:15

## 2024-02-07 RX ADMIN — GABAPENTIN 100 MG: 100 CAPSULE ORAL at 08:52

## 2024-02-07 RX ADMIN — HYDROMORPHONE HYDROCHLORIDE 0.5 MG: 1 INJECTION, SOLUTION INTRAMUSCULAR; INTRAVENOUS; SUBCUTANEOUS at 06:48

## 2024-02-07 RX ADMIN — FAMOTIDINE 20 MG: 20 TABLET ORAL at 08:52

## 2024-02-07 RX ADMIN — FLUOXETINE 40 MG: 20 CAPSULE ORAL at 08:52

## 2024-02-07 RX ADMIN — LEVOTHYROXINE SODIUM 150 MCG: 0.15 TABLET ORAL at 06:39

## 2024-02-07 NOTE — PLAN OF CARE
Goal Outcome Evaluation:  Plan of Care Reviewed With: patient           Outcome Evaluation: Pt has much improved this shift. No bowel movements this shift. Immodium taken at 0930. Pt reports that the oral pain medication has been more effective than IV medication. Pt abdomen is not longer distended or tender. Anticipated discharge home today with oral ABX therapy.

## 2024-02-07 NOTE — DISCHARGE SUMMARY
"[facility]    Provider, No Known    CHIEF COMPLAINT:     Abdominal pain with nausea, vomiting and diarrhea    HISTORY OF PRESENT ILLNESS:    Obtained from admitting physician HPI on 2/5/2024:  History of present illness/63-year-old female with several health troubles with complaint 3-day history of nausea vomiting and diarrhea abdominal cramping and unable to hold anything down.  She denies any fever or chills.  No urinary complaints no new exposures foreign travels or antibiotic use or recent hospitalization.  Nothing seem to trigger nothing make it better or worse.  Patient concerned as she has had a previous bowel perforation requiring surgery in the past.  No black or bloody stool or bloody vomitus.    02/06/24:  Patient confirms the HPI noted above including approximately 3 to 4-day history of worsening abdominal pain located centrally in her abdomen radiating out described as a intermittent sharp with a constant cramping type pain made worse with any p.o. intake including liquids.  She has had multiple episodes of nausea and nonbloody vomiting and notes nonbloody diarrhea.  She denies any fever, dyspnea, known sick contacts or unusual p.o. intake.  She does not smoke or drink alcohol, confirms compliance with all of her outpatient medical therapies and reports no recent antibiotic regimens.  She does have a fairly significant history of abdominal surgeries including previous colectomy following a perforated colon and cholecystectomy.  She does also report that she has a history of \"ulcers\".    2/7/2024:  Patient reports pain as well as nausea have improved though she does continue to experience some frequent diarrhea.  No other new or acute symptoms are noted.          Past Medical History:   Diagnosis Date    Arthritis     Colon perforation 2012    Depression     GERD     Hypothyroidism     MAMMO     Neuropathy     Hands/ Feet    Osteoporosis     PAP     Rheumatoid aortitis     " Scoliosis 2010    Stomach ulcer 2012     Past Surgical History:   Procedure Laterality Date    BACK SURGERY  [id number]    6 surgeries    COLON SURGERY      Colon Resection    COLONOSCOPY       Family History   Problem Relation Age of Onset    Arthritis Mother     Cancer Mother 58        Colon Cancer    Osteoporosis Mother     Hypertension Mother     Stroke Mother     Hyperlipidemia Mother     Thyroid disease Mother     Liver disease Father     Alcohol abuse Father     Thyroid disease Sister     Cancer Maternal Aunt         lung     Social History     Tobacco Use    Smoking status: Former     Packs/day: 0.50     Years: 25.00     Additional pack years: 0.00     Total pack years: 12.50     Types: Cigarettes     Quit date:      Years since quittin.1    Smokeless tobacco: Never   Vaping Use    Vaping Use: Never used   Substance Use Topics    Alcohol use: Never    Drug use: Not Currently     Comment: Marijuana ---- occasionally     Medications Prior to Admission   Medication Sig Dispense Refill Last Dose    atorvastatin (LIPITOR) 80 MG tablet Take 1 tablet by mouth Every Night.   2024 at 2200    cyclobenzaprine (FLEXERIL) 5 MG tablet Take 1 tablet by mouth 2 (Two) Times a Day As Needed for Muscle Spasms.   2024 at 2200    famotidine (PEPCID) 20 MG tablet Take 1 tablet by mouth 2 (Two) Times a Day.   2024 at 1200    FLUoxetine (PROzac) 40 MG capsule Take 1 capsule by mouth Daily.   2024 at 0900    gabapentin (NEURONTIN) 100 MG capsule Take 1 capsule by mouth 2 (Two) Times a Day.   2024 at 2200    levothyroxine (SYNTHROID, LEVOTHROID) 150 MCG tablet Take 1 tablet by mouth Daily.   2024 at 0900    ondansetron (Zofran) 4 MG tablet Take 1 tablet by mouth Every 8 (Eight) Hours As Needed for Nausea or Vomiting. 15 tablet 0 2024 at 2200    oxyCODONE (ROXICODONE) 10 MG tablet Take 1 tablet by mouth Every 4 (Four) Hours As Needed.   2024 at 1500     Allergies:  Duloxetine hcl, Erythromycin,  Morphine, Sulfa antibiotics, and Trazodone    Immunization History   Administered Date(s) Administered    Flu Vaccine Intradermal Quad 18-64YR 12/05/2011, 02/10/2017    Fluzone (or Fluarix & Flulaval for VFC) >6mos 12/01/2020           REVIEW OF SYSTEMS:    Review of Systems   Constitutional: Negative.   HENT: Negative.     Eyes: Negative.    Cardiovascular: Negative.    Respiratory: Negative.     Skin: Negative.    Musculoskeletal: Negative.    Gastrointestinal:  Positive for abdominal pain, diarrhea, nausea and vomiting.   Genitourinary: Negative.    Neurological: Negative.    Psychiatric/Behavioral: Negative.           Vital Signs  Temp:  [97.6 °F (36.4 °C)-98.2 °F (36.8 °C)] 98 °F (36.7 °C)  Heart Rate:  [59-80] 80  Resp:  [15-19] 18  BP: ()/(57-81) 122/80          Physical Exam:  Physical Exam  Vitals reviewed.   Constitutional:       General: She is not in acute distress.     Appearance: Normal appearance. She is normal weight. She is not ill-appearing, toxic-appearing or diaphoretic.   HENT:      Head: Normocephalic.      Right Ear: External ear normal.      Left Ear: External ear normal.      Nose: Nose normal.      Mouth/Throat:      Mouth: Mucous membranes are moist.   Eyes:      Extraocular Movements: Extraocular movements intact.   Cardiovascular:      Rate and Rhythm: Normal rate and regular rhythm.      Pulses: Normal pulses.   Pulmonary:      Effort: Pulmonary effort is normal.      Breath sounds: Normal breath sounds.   Abdominal:      General: Bowel sounds are normal.      Palpations: Abdomen is soft.      Tenderness: There is abdominal tenderness.   Musculoskeletal:         General: Normal range of motion.      Cervical back: Normal range of motion.      Right lower leg: No edema.      Left lower leg: No edema.   Skin:     General: Skin is warm and dry.      Capillary Refill: Capillary refill takes less than 2 seconds.   Neurological:      General: No focal deficit present.      Mental  Status: She is alert.   Psychiatric:         Mood and Affect: Mood normal.         Behavior: Behavior normal.         Thought Content: Thought content normal.         Judgment: Judgment normal.           Emotional Behavior:   Normal   Debilities:  None  Results Review:    I reviewed the patient's new clinical results.  Lab Results (most recent)       Procedure Component Value Units Date/Time    Basic Metabolic Panel [[id number]]  (Abnormal) Collected: 02/06/24 0413    Specimen: Blood from Arm, Right Updated: 02/06/24 0548     Glucose 93 mg/dL      BUN 11 mg/dL      Creatinine 0.82 mg/dL      Sodium 138 mmol/L      Potassium 2.6 mmol/L      Chloride 104 mmol/L      CO2 22.0 mmol/L      Calcium 9.1 mg/dL      BUN/Creatinine Ratio 13.4     Anion Gap 12.0 mmol/L      eGFR 79.5 mL/min/1.73     Narrative:      GFR Normal >60  Chronic Kidney Disease <60  Kidney Failure <15      CBC Auto Differential [[id number]]  (Abnormal) Collected: 02/06/24 0413    Specimen: Blood from Arm, Right Updated: 02/06/24 0530     WBC 5.90 10*3/mm3      RBC 4.13 10*6/mm3      Hemoglobin 12.7 g/dL      Hematocrit 36.6 %      MCV 88.6 fL      MCH 30.6 pg      MCHC 34.6 g/dL      RDW 12.9 %      RDW-SD 42.9 fl      MPV 10.3 fL      Platelets 257 10*3/mm3      Neutrophil % 43.4 %      Lymphocyte % 40.2 %      Monocyte % 12.1 %      Eosinophil % 3.8 %      Basophil % 0.5 %      Neutrophils, Absolute 2.50 10*3/mm3      Lymphocytes, Absolute 2.40 10*3/mm3      Monocytes, Absolute 0.70 10*3/mm3      Eosinophils, Absolute 0.20 10*3/mm3      Basophils, Absolute 0.00 10*3/mm3      nRBC 0.0 /100 WBC     Urinalysis, Microscopic Only - Urine, Clean Catch [[id number]]  (Abnormal) Collected: 02/05/24 2036    Specimen: Urine, Clean Catch Updated: 02/05/24 2057     RBC, UA 0-2 /HPF      WBC, UA 6-10 /HPF      Bacteria, UA None Seen /HPF      Squamous Epithelial Cells, UA 0-2 /HPF      Hyaline Casts, UA None Seen /LPF      Methodology Manual Light Microscopy     Urinalysis With Microscopic If Indicated (No Culture) - Urine, Clean Catch [[id number]]  (Abnormal) Collected: 02/05/24 2036    Specimen: Urine, Clean Catch Updated: 02/05/24 2046     Color, UA Yellow     Appearance, UA Cloudy     pH, UA 6.5     Specific Gravity, UA 1.066     Glucose, UA Negative     Ketones, UA Negative     Bilirubin, UA Negative     Blood, UA Small (1+)     Protein, UA Negative     Leuk Esterase, UA Moderate (2+)     Nitrite, UA Negative     Urobilinogen, UA 0.2 E.U./dL    Magnesium [[id number]]  (Normal) Collected: 02/05/24 1613    Specimen: Blood from Arm, Left Updated: 02/05/24 1939     Magnesium 1.9 mg/dL     [facility] Draw [[id number]] Collected: 02/05/24 1613    Specimen: Blood from Arm, Left Updated: 02/05/24 1715    Narrative:      The following orders were created for panel order [facility] Draw.  Procedure                               Abnormality         Status                     ---------                               -----------         ------                     Green Top (Gel)[[id number]]                                  Final result               Lavender Top[[id number]]                                     Final result               Gold Top - SST[[id number]]                                   Final result               Light Blue Top[[id number]]                                   Final result                 Please view results for these tests on the individual orders.    Lavender Top [[id number]] Collected: 02/05/24 1613    Specimen: Blood from Arm, Left Updated: 02/05/24 1715     Extra Tube hold for add-on     Comment: Auto resulted       Gold Top - SST [[id number]] Collected: 02/05/24 1613    Specimen: Blood from Arm, Left Updated: 02/05/24 1715     Extra Tube Hold for add-ons.     Comment: Auto resulted.       Light Blue Top [[id number]] Collected: 02/05/24 1613    Specimen: Blood from Arm, Left Updated: 02/05/24 1715     Extra Tube Hold for add-ons.     Comment: Auto resulted       POC  Lactate [[id number]]  (Normal) Collected: 02/05/24 1709    Specimen: Blood Updated: 02/05/24 1713     Lactate 1.1 mmol/L      Comment: Serial Number: [id number]:  [id number]       Green Top (Gel) [[id number]] Collected: 02/05/24 1613    Specimen: Blood from Arm, Left Updated: 02/05/24 1712    Comprehensive Metabolic Panel [[id number]]  (Abnormal) Collected: 02/05/24 1613    Specimen: Blood from Arm, Left Updated: 02/05/24 1710     Glucose 103 mg/dL      BUN 15 mg/dL      Creatinine 0.92 mg/dL      Sodium 137 mmol/L      Potassium 2.4 mmol/L      Chloride 101 mmol/L      CO2 20.0 mmol/L      Calcium 9.9 mg/dL      Total Protein 8.1 g/dL      Albumin 4.5 g/dL      ALT (SGPT) 24 U/L      AST (SGOT) 21 U/L      Alkaline Phosphatase 116 U/L      Total Bilirubin 0.4 mg/dL      Globulin 3.6 gm/dL      A/G Ratio 1.3 g/dL      BUN/Creatinine Ratio 16.3     Anion Gap 16.0 mmol/L      eGFR 69.2 mL/min/1.73     Narrative:      GFR Normal >60  Chronic Kidney Disease <60  Kidney Failure <15      Lipase [[id number]]  (Abnormal) Collected: 02/05/24 1613    Specimen: Blood from Arm, Left Updated: 02/05/24 1708     Lipase 87 U/L     CBC & Differential [[id number]]  (Abnormal) Collected: 02/05/24 1613    Specimen: Blood from Arm, Left Updated: 02/05/24 1638    Narrative:      The following orders were created for panel order CBC & Differential.  Procedure                               Abnormality         Status                     ---------                               -----------         ------                     CBC Auto Differential[[id number]]        Abnormal            Final result                 Please view results for these tests on the individual orders.    CBC Auto Differential [[id number]]  (Abnormal) Collected: 02/05/24 1613    Specimen: Blood from Arm, Left Updated: 02/05/24 1638     WBC 6.10 10*3/mm3      RBC 4.66 10*6/mm3      Hemoglobin 14.6 g/dL      Hematocrit 42.3 %      MCV 90.7 fL      MCH 31.3 pg      MCHC 34.5  g/dL      RDW 12.9 %      RDW-SD 40.7 fl      MPV 9.8 fL      Platelets 331 10*3/mm3      Neutrophil % 44.8 %      Lymphocyte % 37.7 %      Monocyte % 13.9 %      Eosinophil % 3.0 %      Basophil % 0.6 %      Neutrophils, Absolute 2.80 10*3/mm3      Lymphocytes, Absolute 2.30 10*3/mm3      Monocytes, Absolute 0.90 10*3/mm3      Eosinophils, Absolute 0.20 10*3/mm3      Basophils, Absolute 0.00 10*3/mm3      nRBC 0.1 /100 WBC             Imaging Results (Most Recent)       Procedure Component Value Units Date/Time    CT Abdomen Pelvis With Contrast [[id number]] Collected: 02/05/24 1810     Updated: 02/05/24 1830    Narrative:      CT ABDOMEN PELVIS W CONTRAST    Date of Exam: 2/5/2024 4:43 PM CST    Indication: Severe diffuse abdominal pain vomiting diarrhea previous cholecystectomy appendectomy and perforated bowel.    Comparison: 5/27/2023    Technique: Axial CT images were obtained of the abdomen and pelvis following the uneventful intravenous administration of 100 cc Isovue-370. Sagittal and coronal reconstructions were performed.  Automated exposure control and iterative reconstruction   methods were used.        Findings:  LUNG BASES: Minimal basal atelectasis.    LIVER:  Unremarkable parenchyma without focal lesion.  BILIARY/GALLBLADDER: Cholecystectomy  SPLEEN:  Unremarkable  PANCREAS:  Unremarkable  ADRENAL: There is similar nodular thickening of the body of the left adrenal gland measuring up to 1.1 cm in transverse diameter.  KIDNEYS:  Unremarkable parenchyma with no solid mass identified. No obstruction.  There is an 11 mm nonobstructive lower pole right renal calculus.  GASTROINTESTINAL/MESENTERY: Diffuse fluid-filled small and large intestine without transition point to suggest obstruction. Imaging features are most suggestive of diarrheal state. No mural thickening to suggest inflammation although early enterocolitis   is a consideration.  MESENTERIC VESSELS:  Patent.  AORTA/IVC:  Normal  caliber.    RETROPERITONEUM/LYMPH NODES:  Unremarkable    REPRODUCTIVE:  Unremarkable  BLADDER:  Unremarkable    OSSEUS STRUCTURES: No acute process identified..        Impression:      Impression:  1.Fluid-filled colon and to a lesser degree small intestine suggestive of diarrheal state and the potential for early enterocolitis.            Electronically Signed: Griffin Morales MD    2/5/2024 5:23 PM CST    Workstation ID: [id number]          reviewed    ECG/EMG Results (most recent)       Procedure Component Value Units Date/Time    ECG 12 Lead QT Measurement [[id number]] Collected: 02/06/24 1401     Updated: 02/06/24 1403     QT Interval 478 ms      QTC Interval 487 ms     Narrative:      HEART RATE= 62  bpm  RR Interval= 964  ms  MO Interval= 158  ms  P Horizontal Axis= -8  deg  P Front Axis= 59  deg  QRSD Interval= 100  ms  QT Interval= 478  ms  QTcB= 487  ms  QRS Axis= 38  deg  T Wave Axis= 65  deg  - NORMAL ECG -  Sinus rhythm  Electronically Signed By:   Date and Time of Study: 2024-02-06 14:01:01          not reviewed            Microbiology Results (last 10 days)       Procedure Component Value - Date/Time    Clostridioides difficile Toxin - Stool, Per Rectum [[id number]]  (Normal) Collected: 02/06/24 1652    Lab Status: Final result Specimen: Stool from Per Rectum Updated: 02/07/24 0809    Narrative:      The following orders were created for panel order Clostridioides difficile Toxin - Stool, Per Rectum.  Procedure                               Abnormality         Status                     ---------                               -----------         ------                     Clostridioides difficile...[[id number]]  Normal              Final result                 Please view results for these tests on the individual orders.    Clostridioides difficile EIA - Stool, Per Rectum [[id number]]  (Normal) Collected: 02/06/24 1652    Lab Status: Final result Specimen: Stool from Per Rectum Updated: 02/07/24 0809     C  Diff GDH Ag Negative     C.diff Toxin Ag Negative    Narrative:      The result indicates the absence of toxigenic C.difficile from stool specimen.    Gastrointestinal Panel, PCR - Stool, Per Rectum [[id number]]  (Abnormal) Collected: 02/06/24 0842    Lab Status: Final result Specimen: Stool from Per Rectum Updated: 02/06/24 1048     Campylobacter Detected     Plesiomonas shigelloides Not Detected     Salmonella Not Detected     Vibrio Not Detected     Vibrio cholerae Not Detected     Yersinia enterocolitica Not Detected     Enteroaggregative E. coli (EAEC) Not Detected     Enteropathogenic E. coli (EPEC) Not Detected     Enterotoxigenic E. coli (ETEC) lt/st Not Detected     Shiga-like toxin-producing E. coli (STEC) stx1/stx2 Not Detected     Shigella/Enteroinvasive E. coli (EIEC) Not Detected     Cryptosporidium Not Detected     Cyclospora cayetanensis Not Detected     Entamoeba histolytica Not Detected     Giardia lamblia Not Detected     Adenovirus F40/41 Not Detected     Astrovirus Not Detected     Norovirus GI/GII Not Detected     Rotavirus A Not Detected     Sapovirus (I, II, IV or V) Not Detected            Assessment & Plan     Hypokalemia        Abdominal pain with nausea, vomiting and diarrhea  Lab Results   Component Value Date    WBC 5.20 02/07/2024    AST 16 02/07/2024    ALT 14 02/07/2024    ALKPHOS 87 02/07/2024    BILITOT 0.2 02/07/2024    LIPASE 87 (H) 02/05/2024   -Anion gap initially 16.0 with a serum CO2 of 20.0, trended to 12.0 and 22 respectively on the morning following admission  -Lactate: 1.1  -UA showed no bacteria with 6-20 WBCs, moderate leukocyte esterase, negative nitrites, 1+ blood and a specific gravity elevated at 1.066  -CT of abdomen and pelvis: Showed a fluid-filled colon and to a lesser degree small intestine suggesting diarrheal state and potentially early enterocolitis  -In the ED patient was given IV Dilaudid as well as a 1 L fluid bolus and Zofran  -GI panel ordered  -Clear  liquid diet  -Monitor BMP while admitted    Hypokalemia  Lab Results   Component Value Date    K 3.8 02/07/2024    MG 1.9 02/05/2024   -Potassium initially 2.4  -Electrolyte replacement protocol ordered  -Monitor potassium and magnesium    Hyperlipidemia  -Statin    Depression  -Fluoxetine    Hypothyroidism  -Levothyroxine      I discussed the patients findings and my recommendations with patient and nursing staff.     Discharge Diagnosis:      Hypokalemia      Hospital Course  Patient is a 63 y.o. female presented with abdominal pain with nausea, vomiting and diarrhea.  CBC and CMP were assessed with potassium initially decreased to 2.4 with magnesium of 1.9, anion gap of 16.0 with a serum CO2 of 20.0.  Lipase was slightly elevated at 87 with a lactate of 1.1.  UA showed no bacteria with some WBCs, moderate leukocyte esterase, 1+ blood and elevated specific gravity of 1.066.  CT of abdomen and pelvis showed a fluid-filled colon and to a lesser degree small intestine suggesting a diarrheal state potentially early enterocolitis.  Patient received IV Dilaudid as well as a 1 L fluid bolus and Zofran.  GI panel was ordered and found to be positive for Campylobacter.  Levaquin was subsequently started secondary to patient's erythromycin allergy.  GI consulted who also ordered C. difficile studies which were found to be negative and had considered endoscopies however given findings of Campylobacter infection and improvement in symptoms these will be deferred.  Antidiarrheals were also initiated clear liquid diet was initiated and advanced.  Anion gap improved to 12.0 with a serum CO2 of 22 on the morning following admission.  Potassium improved somewhat to 2.6 and replacement protocol was continued with improvement to 3.8 on 2/7/2024.  Levaquin as well as Bentyl and Zofran will be prescribed at discharge.  At this time patient is felt to be in good condition for discharge with close follow-up with her PCP on an outpatient  basis.  Her full testing/results and plan were discussed with patient along with concerning/alarm symptoms for which to call 911/return to the ED.  All questions were answered and she verbalizes her understanding and agreement.    Past Medical History:     Past Medical History:   Diagnosis Date    Arthritis     Colon perforation     Depression     GERD     Hypothyroidism     MAMMO     Neuropathy     Hands/ Feet    Osteoporosis     PAP     Rheumatoid aortitis     Scoliosis 2010    Stomach ulcer        Past Surgical History:     Past Surgical History:   Procedure Laterality Date    BACK SURGERY  [id number]    6 surgeries    COLON SURGERY      Colon Resection    COLONOSCOPY         Social History:   Social History     Socioeconomic History    Marital status:    Tobacco Use    Smoking status: Former     Packs/day: 0.50     Years: 25.00     Additional pack years: 0.00     Total pack years: 12.50     Types: Cigarettes     Quit date:      Years since quittin.1    Smokeless tobacco: Never   Vaping Use    Vaping Use: Never used   Substance and Sexual Activity    Alcohol use: Never    Drug use: Not Currently     Comment: Marijuana ---- occasionally    Sexual activity: Not Currently       Procedures Performed         Consults:   Consults       Date and Time Order Name Status Description    2024  8:09 AM Inpatient Gastroenterology Consult Completed             Condition on Discharge:     Stable    Discharge Disposition  Home or Self Care    Discharge Medications     Discharge Medications        New Medications        Instructions Start Date   dicyclomine 10 MG capsule  Commonly known as: BENTYL   20 mg, Oral, 4 Times Daily      levoFLOXacin 750 MG tablet  Commonly known as: LEVAQUIN   750 mg, Oral, Every 24 Hours   Start Date: 2024     ondansetron ODT 4 MG disintegrating tablet  Commonly known as: ZOFRAN-ODT   4 mg, Translingual, Every 8 Hours PRN             Continue These Medications         Instructions Start Date   atorvastatin 80 MG tablet  Commonly known as: LIPITOR   80 mg, Oral, Nightly      cyclobenzaprine 5 MG tablet  Commonly known as: FLEXERIL   5 mg, Oral, 2 Times Daily PRN      famotidine 20 MG tablet  Commonly known as: PEPCID   20 mg, Oral, 2 Times Daily      gabapentin 100 MG capsule  Commonly known as: NEURONTIN   100 mg, Oral, 2 Times Daily      levothyroxine 150 MCG tablet  Commonly known as: SYNTHROID, LEVOTHROID   150 mcg, Oral, Daily      oxyCODONE 10 MG tablet  Commonly known as: ROXICODONE   10 mg, Oral, Every 4 Hours PRN      PROzac 40 MG capsule  Generic drug: FLUoxetine   40 mg, Oral, Daily             Stop These Medications      ondansetron 4 MG tablet  Commonly known as: Zofran              Discharge Diet:   Diet Instructions       Diet: Regular/House Diet; Thin (IDDSI 0)      Discharge Diet: Regular/House Diet    Fluid Consistency: Thin (IDDSI 0)            Activity at Discharge:     Follow-up Appointments  No future appointments.  Additional Instructions for the Follow-ups that You Need to Schedule       Discharge Follow-up with PCP   As directed       Currently Documented PCP:    Provider, No Known    PCP Phone Number:    None     Follow Up Details: 5 to 7 days                Test Results Pending at Discharge         Risk for Readmission (LACE) Score: 2 (2/7/2024  6:00 AM)      Greater than 30 minutes spent in discharge activities for this patient    Signature:Electronically signed by Jong Estrada PA-C, 02/07/24, 2:46 PM EST.

## 2024-02-07 NOTE — CASE MANAGEMENT/SOCIAL WORK
Continued Stay Note  LUIS Manning     Patient Name: Kavita Garcia  MRN: [id number]  Today's Date: 2/7/2024    Admit Date: 2/5/2024    Plan: Return home   Discharge Plan       Row Name 02/07/24 1032       Plan    Plan Comments DC barriers: Patient conitnues with diarrhea and receiving IVF, pain and nausea medicine, GI following.                Carlos Gonzalez RN     Cell number [phone]  Office number [phone]

## 2024-02-07 NOTE — PLAN OF CARE
Problem: Adult Inpatient Plan of Care  Goal: Absence of Hospital-Acquired Illness or Injury  Outcome: Ongoing, Progressing  Intervention: Identify and Manage Fall Risk  Recent Flowsheet Documentation  Taken 2/7/2024 0400 by Lizbet Hackett RN  Safety Promotion/Fall Prevention:   safety round/check completed   assistive device/personal items within reach   clutter free environment maintained   nonskid shoes/slippers when out of bed  Taken 2/7/2024 0230 by Lizbet Hackett RN  Safety Promotion/Fall Prevention:   safety round/check completed   assistive device/personal items within reach   clutter free environment maintained   nonskid shoes/slippers when out of bed  Taken 2/7/2024 0000 by Lizbet Hackett RN  Safety Promotion/Fall Prevention:   safety round/check completed   assistive device/personal items within reach   clutter free environment maintained   nonskid shoes/slippers when out of bed  Taken 2/6/2024 2200 by Lizbet Hackett RN  Safety Promotion/Fall Prevention:   room organization consistent   assistive device/personal items within reach   clutter free environment maintained   safety round/check completed  Taken 2/6/2024 2002 by Lizbet Hackett RN  Safety Promotion/Fall Prevention:   safety round/check completed   assistive device/personal items within reach   clutter free environment maintained   nonskid shoes/slippers when out of bed  Intervention: Prevent Skin Injury  Recent Flowsheet Documentation  Taken 2/6/2024 2002 by Lizbet Hackett RN  Skin Protection: adhesive use limited  Intervention: Prevent and Manage VTE (Venous Thromboembolism) Risk  Recent Flowsheet Documentation  Taken 2/6/2024 2002 by Lizbet Hackett RN  Activity Management: up ad esteban  VTE Prevention/Management: patient refused intervention  Range of Motion: active ROM (range of motion) encouraged  Intervention: Prevent Infection  Recent Flowsheet Documentation  Taken 2/7/2024 0400 by Lizbet Hackett RN  Infection Prevention:   hand hygiene promoted   personal  protective equipment utilized   rest/sleep promoted   single patient room provided  Taken 2/7/2024 0230 by Lizbet Hackett RN  Infection Prevention:   hand hygiene promoted   personal protective equipment utilized   rest/sleep promoted   single patient room provided  Taken 2/7/2024 0000 by Lizbet Hackett RN  Infection Prevention:   hand hygiene promoted   personal protective equipment utilized   rest/sleep promoted   single patient room provided  Taken 2/6/2024 2200 by Lizbet Hackett RN  Infection Prevention:   hand hygiene promoted   personal protective equipment utilized   rest/sleep promoted   single patient room provided  Taken 2/6/2024 2002 by Lizbet Hackett RN  Infection Prevention:   hand hygiene promoted   personal protective equipment utilized   rest/sleep promoted   single patient room provided     Problem: Pain Chronic (Persistent) (Comorbidity Management)  Goal: Acceptable Pain Control and Functional Ability  Outcome: Ongoing, Progressing  Intervention: Manage Persistent Pain  Recent Flowsheet Documentation  Taken 2/6/2024 2002 by Lizbet Hackett RN  Sleep/Rest Enhancement:   awakenings minimized   noise level reduced   room darkened  Medication Review/Management: medications reviewed  Intervention: Optimize Psychosocial Wellbeing  Recent Flowsheet Documentation  Taken 2/6/2024 2002 by Lizbet Hackett RN  Supportive Measures: active listening utilized  Diversional Activities:   smartphone   television  Family/Support System Care: support provided     Problem: Nausea and Vomiting  Goal: Fluid and Electrolyte Balance  Outcome: Ongoing, Progressing  Intervention: Prevent and Manage Nausea and Vomiting  Recent Flowsheet Documentation  Taken 2/6/2024 2002 by Lizbet Hackett RN  Nausea/Vomiting Interventions: see MAR     Problem: Electrolyte Imbalance  Goal: Electrolyte Balance  Outcome: Ongoing, Progressing   Goal Outcome Evaluation:    Pt continues to receive IV fluids, pain/nausea medications, and continues to have loose  bowel movements. Will continue to monitor.

## 2024-02-08 NOTE — CASE MANAGEMENT/SOCIAL WORK
Case Management Discharge Note      Final Note: Routine home         Selected Continued Care - Discharged on 2/7/2024 Admission date: 2/5/2024 - Discharge disposition: Home or Self Care         Transportation Services  Private: Car    Final Discharge Disposition Code: 01 - home or self-care

## 2024-02-12 LAB
QT INTERVAL: 478 MS
QTC INTERVAL: 487 MS

## 2024-02-16 ENCOUNTER — APPOINTMENT (OUTPATIENT)
Dept: GENERAL RADIOLOGY | Facility: HOSPITAL | Age: 64
End: 2024-02-16
Payer: MEDICARE

## 2024-02-16 ENCOUNTER — APPOINTMENT (OUTPATIENT)
Dept: ULTRASOUND IMAGING | Facility: HOSPITAL | Age: 64
End: 2024-02-16
Payer: MEDICARE

## 2024-02-16 ENCOUNTER — HOSPITAL ENCOUNTER (EMERGENCY)
Facility: HOSPITAL | Age: 64
Discharge: HOME | End: 2024-02-16
Attending: EMERGENCY MEDICINE
Payer: MEDICARE

## 2024-02-16 VITALS
RESPIRATION RATE: 20 BRPM | WEIGHT: 145 LBS | SYSTOLIC BLOOD PRESSURE: 113 MMHG | HEART RATE: 76 BPM | OXYGEN SATURATION: 93 % | HEIGHT: 65 IN | TEMPERATURE: 98 F | DIASTOLIC BLOOD PRESSURE: 61 MMHG | BODY MASS INDEX: 24.16 KG/M2

## 2024-02-16 DIAGNOSIS — R60.9 EDEMA, UNSPECIFIED TYPE: Primary | ICD-10-CM

## 2024-02-16 LAB
ANION GAP SERPL CALCULATED.3IONS-SCNC: 10.1 MMOL/L (ref 5–15)
BASOPHILS # BLD AUTO: 0.05 10*3/MM3 (ref 0–0.2)
BASOPHILS NFR BLD AUTO: 0.6 % (ref 0–1.5)
BILIRUB UR QL STRIP: NEGATIVE
BUN SERPL-MCNC: 9 MG/DL (ref 8–23)
BUN/CREAT SERPL: 9.3 (ref 7–25)
CALCIUM SPEC-SCNC: 9.2 MG/DL (ref 8.6–10.5)
CHLORIDE SERPL-SCNC: 100 MMOL/L (ref 98–107)
CLARITY UR: ABNORMAL
CO2 SERPL-SCNC: 25.9 MMOL/L (ref 22–29)
COLOR UR: YELLOW
CREAT SERPL-MCNC: 0.97 MG/DL (ref 0.57–1)
DEPRECATED RDW RBC AUTO: 44.4 FL (ref 37–54)
EGFRCR SERPLBLD CKD-EPI 2021: 65 ML/MIN/1.73
EOSINOPHIL # BLD AUTO: 0.21 10*3/MM3 (ref 0–0.4)
EOSINOPHIL NFR BLD AUTO: 2.3 % (ref 0.3–6.2)
ERYTHROCYTE [DISTWIDTH] IN BLOOD BY AUTOMATED COUNT: 12.9 % (ref 12.3–15.4)
GLUCOSE SERPL-MCNC: 96 MG/DL (ref 65–99)
GLUCOSE UR STRIP-MCNC: NEGATIVE MG/DL
HCT VFR BLD AUTO: 35.9 % (ref 34–46.6)
HGB BLD-MCNC: 11.5 G/DL (ref 12–15.9)
HGB UR QL STRIP.AUTO: ABNORMAL
IMM GRANULOCYTES # BLD AUTO: 0.02 10*3/MM3 (ref 0–0.05)
IMM GRANULOCYTES NFR BLD AUTO: 0.2 % (ref 0–0.5)
KETONES UR QL STRIP: NEGATIVE
LEUKOCYTE ESTERASE UR QL STRIP.AUTO: NEGATIVE
LYMPHOCYTES # BLD AUTO: 1.99 10*3/MM3 (ref 0.7–3.1)
LYMPHOCYTES NFR BLD AUTO: 22 % (ref 19.6–45.3)
MCH RBC QN AUTO: 30.6 PG (ref 26.6–33)
MCHC RBC AUTO-ENTMCNC: 32 G/DL (ref 31.5–35.7)
MCV RBC AUTO: 95.5 FL (ref 79–97)
MONOCYTES # BLD AUTO: 0.64 10*3/MM3 (ref 0.1–0.9)
MONOCYTES NFR BLD AUTO: 7.1 % (ref 5–12)
NEUTROPHILS NFR BLD AUTO: 6.12 10*3/MM3 (ref 1.7–7)
NEUTROPHILS NFR BLD AUTO: 67.8 % (ref 42.7–76)
NITRITE UR QL STRIP: NEGATIVE
NT-PROBNP SERPL-MCNC: 528.4 PG/ML (ref 0–900)
PH UR STRIP.AUTO: 6.5 [PH] (ref 5–8)
PLATELET # BLD AUTO: 247 10*3/MM3 (ref 140–450)
PMV BLD AUTO: 11.3 FL (ref 6–12)
POTASSIUM SERPL-SCNC: 3.7 MMOL/L (ref 3.5–5.2)
PROT UR QL STRIP: NEGATIVE
QT INTERVAL: 418 MS
QTC INTERVAL: 458 MS
RBC # BLD AUTO: 3.76 10*6/MM3 (ref 3.77–5.28)
SODIUM SERPL-SCNC: 136 MMOL/L (ref 136–145)
SP GR UR STRIP: 1.02 (ref 1–1.03)
TROPONIN T SERPL HS-MCNC: 10 NG/L
UROBILINOGEN UR QL STRIP: ABNORMAL
WBC NRBC COR # BLD AUTO: 9.03 10*3/MM3 (ref 3.4–10.8)

## 2024-02-16 PROCEDURE — G0463 HOSPITAL OUTPT CLINIC VISIT: HCPCS | Performed by: EMERGENCY MEDICINE

## 2024-02-16 PROCEDURE — 81003 URINALYSIS AUTO W/O SCOPE: CPT | Performed by: EMERGENCY MEDICINE

## 2024-02-16 PROCEDURE — 93971 EXTREMITY STUDY: CPT

## 2024-02-16 PROCEDURE — 36415 COLL VENOUS BLD VENIPUNCTURE: CPT

## 2024-02-16 PROCEDURE — 84484 ASSAY OF TROPONIN QUANT: CPT | Performed by: EMERGENCY MEDICINE

## 2024-02-16 PROCEDURE — 99284 EMERGENCY DEPT VISIT MOD MDM: CPT

## 2024-02-16 PROCEDURE — 93005 ELECTROCARDIOGRAM TRACING: CPT | Performed by: EMERGENCY MEDICINE

## 2024-02-16 PROCEDURE — 85025 COMPLETE CBC W/AUTO DIFF WBC: CPT | Performed by: EMERGENCY MEDICINE

## 2024-02-16 PROCEDURE — 80048 BASIC METABOLIC PNL TOTAL CA: CPT | Performed by: EMERGENCY MEDICINE

## 2024-02-16 PROCEDURE — 83880 ASSAY OF NATRIURETIC PEPTIDE: CPT | Performed by: EMERGENCY MEDICINE

## 2024-02-16 PROCEDURE — 71045 X-RAY EXAM CHEST 1 VIEW: CPT

## 2024-02-16 RX ORDER — HYDROCODONE BITARTRATE AND ACETAMINOPHEN 5; 325 MG/1; MG/1
1 TABLET ORAL ONCE AS NEEDED
Status: DISCONTINUED | OUTPATIENT
Start: 2024-02-16 | End: 2024-02-16 | Stop reason: HOSPADM

## 2024-02-16 RX ADMIN — HYDROCODONE BITARTRATE AND ACETAMINOPHEN 1 TABLET: 5; 325 TABLET ORAL at 15:40

## 2024-02-16 NOTE — FSED PROVIDER NOTE
Subjective   History of Present Illness  Patient is a 63-year-old female who presents to the emergency room with bilateral lower extremity edema.  She denies any shortness of breath.  She states she has had this in the past but that was several years ago and it spontaneously resolved.  Review of Systems   All other systems reviewed and are negative.      Past Medical History:   Diagnosis Date    Arthritis     Colon perforation 2012    Depression     GERD     Hypothyroidism     MAMMO     Neuropathy     Hands/ Feet    Osteoporosis     PAP     Rheumatoid aortitis     Scoliosis 2010    Stomach ulcer 2012       Allergies   Allergen Reactions    Duloxetine Hcl Itching    Erythromycin Hives    Morphine Hives    Sulfa Antibiotics Hives    Melatonin Unknown - Low Severity       Past Surgical History:   Procedure Laterality Date    BACK SURGERY  [id number]    6 surgeries    COLON SURGERY      Colon Resection    COLONOSCOPY         Family History   Problem Relation Age of Onset    Arthritis Mother     Cancer Mother 58        Colon Cancer    Osteoporosis Mother     Hypertension Mother     Stroke Mother     Hyperlipidemia Mother     Thyroid disease Mother     Liver disease Father     Alcohol abuse Father     Thyroid disease Sister     Cancer Maternal Aunt         lung       Social History     Socioeconomic History    Marital status:    Tobacco Use    Smoking status: Former     Packs/day: 0.50     Years: 25.00     Additional pack years: 0.00     Total pack years: 12.50     Types: Cigarettes     Quit date:      Years since quittin.1    Smokeless tobacco: Never   Vaping Use    Vaping Use: Never used   Substance and Sexual Activity    Alcohol use: Never    Drug use: Not Currently     Comment: Marijuana ---- occasionally    Sexual activity: Not Currently           Objective   Physical Exam  Vitals and nursing note reviewed.   Constitutional:       Appearance: Normal appearance.   HENT:      Head: Normocephalic and  atraumatic.      Nose: Nose normal.   Eyes:      Pupils: Pupils are equal, round, and reactive to light.   Cardiovascular:      Rate and Rhythm: Normal rate and regular rhythm.   Pulmonary:      Effort: Pulmonary effort is normal.      Breath sounds: Normal breath sounds.   Musculoskeletal:         General: Swelling present.      Cervical back: Normal range of motion and neck supple.      Comments: Bilateral lower extremity edema slightly worse on the right.  Tenderness to palpation diffusely   Skin:     General: Skin is warm.   Neurological:      General: No focal deficit present.      Mental Status: She is alert and oriented to person, place, and time.         Procedures           ED Course                                           Medical Decision Making  Patient is a well-appearing 63-year-old female who presents to the emergency room with bilateral lower extremity swelling.  She states that she has had this in the past.  She has a history of rheumatoid arthritis and does get swollen at times.  She denies any shortness of breath.  Her workup here in the emergency room including bilateral lower extremity ultrasounds, chest x-ray, BNP and creatinine are all within normal limits.  Patient has stable vital signs and an otherwise unremarkable physical exam.  She was given instructions to keep legs elevated and use compression stockings as needed and to follow-up with her primary care physician.  She is encouraged to return to the ED for any worsening in symptoms    Problems Addressed:  Edema, unspecified type: complicated acute illness or injury    Amount and/or Complexity of Data Reviewed  Labs: ordered.  Radiology: ordered.  ECG/medicine tests: ordered.    Risk  Prescription drug management.        Final diagnoses:   Edema, unspecified type       ED Disposition  ED Disposition       ED Disposition   Discharge    Condition   Stable    Comment   --               Snehal Mccarty MD  [address]  [place] [address]  [phone]               Medication List      No changes were made to your prescriptions during this visit.

## 2024-06-30 ENCOUNTER — APPOINTMENT (OUTPATIENT)
Dept: CT IMAGING | Facility: HOSPITAL | Age: 64
End: 2024-06-30
Payer: MEDICARE

## 2024-06-30 ENCOUNTER — PREP FOR SURGERY (OUTPATIENT)
Dept: OTHER | Facility: HOSPITAL | Age: 64
End: 2024-06-30
Payer: MEDICARE

## 2024-06-30 ENCOUNTER — HOSPITAL ENCOUNTER (EMERGENCY)
Facility: HOSPITAL | Age: 64
Discharge: HOME | End: 2024-06-30
Attending: EMERGENCY MEDICINE | Admitting: EMERGENCY MEDICINE
Payer: MEDICARE

## 2024-06-30 VITALS
HEART RATE: 68 BPM | SYSTOLIC BLOOD PRESSURE: 125 MMHG | WEIGHT: 128.97 LBS | TEMPERATURE: 98.8 F | RESPIRATION RATE: 18 BRPM | OXYGEN SATURATION: 96 % | DIASTOLIC BLOOD PRESSURE: 72 MMHG | HEIGHT: 65 IN | BODY MASS INDEX: 21.49 KG/M2

## 2024-06-30 DIAGNOSIS — N20.1 RIGHT URETERAL STONE: Primary | ICD-10-CM

## 2024-06-30 DIAGNOSIS — N20.1 URETEROLITHIASIS: Primary | ICD-10-CM

## 2024-06-30 LAB
ALBUMIN SERPL-MCNC: 4.1 G/DL (ref 3.5–5.2)
ALBUMIN/GLOB SERPL: 1.6 G/DL
ALP SERPL-CCNC: 109 U/L (ref 39–117)
ALT SERPL W P-5'-P-CCNC: 20 U/L (ref 1–33)
ANION GAP SERPL CALCULATED.3IONS-SCNC: 12.5 MMOL/L (ref 5–15)
AST SERPL-CCNC: 18 U/L (ref 1–32)
BACTERIA UR QL AUTO: ABNORMAL /HPF
BASOPHILS # BLD AUTO: 0.04 10*3/MM3 (ref 0–0.2)
BASOPHILS NFR BLD AUTO: 0.7 % (ref 0–1.5)
BILIRUB SERPL-MCNC: 0.3 MG/DL (ref 0–1.2)
BILIRUB UR QL STRIP: NEGATIVE
BUN SERPL-MCNC: 10 MG/DL (ref 8–23)
BUN/CREAT SERPL: 13.3 (ref 7–25)
CALCIUM SPEC-SCNC: 9.3 MG/DL (ref 8.6–10.5)
CHLORIDE SERPL-SCNC: 100 MMOL/L (ref 98–107)
CLARITY UR: CLEAR
CO2 SERPL-SCNC: 24.5 MMOL/L (ref 22–29)
COLOR UR: ABNORMAL
CREAT SERPL-MCNC: 0.75 MG/DL (ref 0.57–1)
D-LACTATE SERPL-SCNC: 1.7 MMOL/L (ref 0.5–2)
DEPRECATED RDW RBC AUTO: 40.4 FL (ref 37–54)
EGFRCR SERPLBLD CKD-EPI 2021: 88.5 ML/MIN/1.73
EOSINOPHIL # BLD AUTO: 0.14 10*3/MM3 (ref 0–0.4)
EOSINOPHIL NFR BLD AUTO: 2.4 % (ref 0.3–6.2)
ERYTHROCYTE [DISTWIDTH] IN BLOOD BY AUTOMATED COUNT: 12.9 % (ref 12.3–15.4)
GLOBULIN UR ELPH-MCNC: 2.5 GM/DL
GLUCOSE SERPL-MCNC: 124 MG/DL (ref 65–99)
GLUCOSE UR STRIP-MCNC: NEGATIVE MG/DL
HCT VFR BLD AUTO: 37.3 % (ref 34–46.6)
HGB BLD-MCNC: 13.1 G/DL (ref 12–15.9)
HGB UR QL STRIP.AUTO: ABNORMAL
HOLD SPECIMEN: NORMAL
HOLD SPECIMEN: NORMAL
HYALINE CASTS UR QL AUTO: ABNORMAL /LPF
IMM GRANULOCYTES # BLD AUTO: 0.01 10*3/MM3 (ref 0–0.05)
IMM GRANULOCYTES NFR BLD AUTO: 0.2 % (ref 0–0.5)
KETONES UR QL STRIP: ABNORMAL
LEUKOCYTE ESTERASE UR QL STRIP.AUTO: ABNORMAL
LIPASE SERPL-CCNC: 14 U/L (ref 13–60)
LYMPHOCYTES # BLD AUTO: 1.96 10*3/MM3 (ref 0.7–3.1)
LYMPHOCYTES NFR BLD AUTO: 33.9 % (ref 19.6–45.3)
MCH RBC QN AUTO: 30.4 PG (ref 26.6–33)
MCHC RBC AUTO-ENTMCNC: 35.1 G/DL (ref 31.5–35.7)
MCV RBC AUTO: 86.5 FL (ref 79–97)
MONOCYTES # BLD AUTO: 0.48 10*3/MM3 (ref 0.1–0.9)
MONOCYTES NFR BLD AUTO: 8.3 % (ref 5–12)
NEUTROPHILS NFR BLD AUTO: 3.16 10*3/MM3 (ref 1.7–7)
NEUTROPHILS NFR BLD AUTO: 54.5 % (ref 42.7–76)
NITRITE UR QL STRIP: NEGATIVE
NRBC BLD AUTO-RTO: 0 /100 WBC (ref 0–0.2)
PH UR STRIP.AUTO: 6 [PH] (ref 5–8)
PLATELET # BLD AUTO: 274 10*3/MM3 (ref 140–450)
PMV BLD AUTO: 11.5 FL (ref 6–12)
POTASSIUM SERPL-SCNC: 3.2 MMOL/L (ref 3.5–5.2)
PROT SERPL-MCNC: 6.6 G/DL (ref 6–8.5)
PROT UR QL STRIP: ABNORMAL
RBC # BLD AUTO: 4.31 10*6/MM3 (ref 3.77–5.28)
RBC # UR STRIP: ABNORMAL /HPF
REF LAB TEST METHOD: ABNORMAL
SODIUM SERPL-SCNC: 137 MMOL/L (ref 136–145)
SP GR UR STRIP: 1.03 (ref 1–1.03)
SQUAMOUS #/AREA URNS HPF: ABNORMAL /HPF
UROBILINOGEN UR QL STRIP: ABNORMAL
WBC # UR STRIP: ABNORMAL /HPF
WBC NRBC COR # BLD AUTO: 5.79 10*3/MM3 (ref 3.4–10.8)
WHOLE BLOOD HOLD COAG: NORMAL
WHOLE BLOOD HOLD SPECIMEN: NORMAL

## 2024-06-30 PROCEDURE — 96365 THER/PROPH/DIAG IV INF INIT: CPT

## 2024-06-30 PROCEDURE — 25010000002 HYDROMORPHONE 1 MG/ML SOLUTION: Performed by: EMERGENCY MEDICINE

## 2024-06-30 PROCEDURE — 74177 CT ABD & PELVIS W/CONTRAST: CPT

## 2024-06-30 PROCEDURE — 96375 TX/PRO/DX INJ NEW DRUG ADDON: CPT

## 2024-06-30 PROCEDURE — 99285 EMERGENCY DEPT VISIT HI MDM: CPT

## 2024-06-30 PROCEDURE — 87086 URINE CULTURE/COLONY COUNT: CPT

## 2024-06-30 PROCEDURE — 83605 ASSAY OF LACTIC ACID: CPT | Performed by: EMERGENCY MEDICINE

## 2024-06-30 PROCEDURE — 85025 COMPLETE CBC W/AUTO DIFF WBC: CPT | Performed by: EMERGENCY MEDICINE

## 2024-06-30 PROCEDURE — 25510000001 IOPAMIDOL PER 1 ML: Performed by: EMERGENCY MEDICINE

## 2024-06-30 PROCEDURE — 25010000002 ONDANSETRON PER 1 MG: Performed by: EMERGENCY MEDICINE

## 2024-06-30 PROCEDURE — 83690 ASSAY OF LIPASE: CPT | Performed by: EMERGENCY MEDICINE

## 2024-06-30 PROCEDURE — 25010000002 CEFTRIAXONE PER 250 MG

## 2024-06-30 PROCEDURE — 81001 URINALYSIS AUTO W/SCOPE: CPT | Performed by: EMERGENCY MEDICINE

## 2024-06-30 PROCEDURE — 80053 COMPREHEN METABOLIC PANEL: CPT | Performed by: EMERGENCY MEDICINE

## 2024-06-30 RX ORDER — OXYCODONE HYDROCHLORIDE AND ACETAMINOPHEN 5; 325 MG/1; MG/1
2 TABLET ORAL ONCE
Status: DISCONTINUED | OUTPATIENT
Start: 2024-06-30 | End: 2024-06-30

## 2024-06-30 RX ORDER — TAMSULOSIN HYDROCHLORIDE 0.4 MG/1
1 CAPSULE ORAL DAILY
Qty: 10 CAPSULE | Refills: 0 | Status: ON HOLD | OUTPATIENT
Start: 2024-06-30 | End: 2024-07-01

## 2024-06-30 RX ORDER — ONDANSETRON 4 MG/1
4 TABLET, ORALLY DISINTEGRATING ORAL EVERY 8 HOURS PRN
Qty: 15 TABLET | Refills: 0 | Status: SHIPPED | OUTPATIENT
Start: 2024-06-30 | End: 2024-06-30

## 2024-06-30 RX ORDER — ONDANSETRON 2 MG/ML
4 INJECTION INTRAMUSCULAR; INTRAVENOUS ONCE
Status: COMPLETED | OUTPATIENT
Start: 2024-06-30 | End: 2024-06-30

## 2024-06-30 RX ORDER — TAMSULOSIN HYDROCHLORIDE 0.4 MG/1
1 CAPSULE ORAL DAILY
Qty: 10 CAPSULE | Refills: 0 | Status: SHIPPED | OUTPATIENT
Start: 2024-06-30 | End: 2024-06-30

## 2024-06-30 RX ORDER — ONDANSETRON 4 MG/1
4 TABLET, ORALLY DISINTEGRATING ORAL EVERY 8 HOURS PRN
Qty: 15 TABLET | Refills: 0 | Status: SHIPPED | OUTPATIENT
Start: 2024-06-30

## 2024-06-30 RX ORDER — OXYCODONE HYDROCHLORIDE AND ACETAMINOPHEN 5; 325 MG/1; MG/1
1 TABLET ORAL EVERY 6 HOURS PRN
Qty: 12 TABLET | Refills: 0 | Status: SHIPPED | OUTPATIENT
Start: 2024-06-30 | End: 2024-06-30 | Stop reason: SDUPTHER

## 2024-06-30 RX ORDER — CEPHALEXIN 500 MG/1
500 CAPSULE ORAL 2 TIMES DAILY
Qty: 14 CAPSULE | Refills: 0 | Status: SHIPPED | OUTPATIENT
Start: 2024-06-30 | End: 2024-07-07

## 2024-06-30 RX ORDER — SODIUM CHLORIDE 0.9 % (FLUSH) 0.9 %
10 SYRINGE (ML) INJECTION AS NEEDED
Status: DISCONTINUED | OUTPATIENT
Start: 2024-06-30 | End: 2024-06-30 | Stop reason: HOSPADM

## 2024-06-30 RX ORDER — OXYCODONE HYDROCHLORIDE AND ACETAMINOPHEN 5; 325 MG/1; MG/1
1 TABLET ORAL ONCE
Status: COMPLETED | OUTPATIENT
Start: 2024-06-30 | End: 2024-06-30

## 2024-06-30 RX ORDER — OXYCODONE HYDROCHLORIDE AND ACETAMINOPHEN 5; 325 MG/1; MG/1
1 TABLET ORAL EVERY 6 HOURS PRN
Qty: 12 TABLET | Refills: 0 | Status: ON HOLD | OUTPATIENT
Start: 2024-06-30 | End: 2024-07-01

## 2024-06-30 RX ORDER — CEPHALEXIN 500 MG/1
500 CAPSULE ORAL 2 TIMES DAILY
Qty: 14 CAPSULE | Refills: 0 | Status: SHIPPED | OUTPATIENT
Start: 2024-06-30 | End: 2024-06-30

## 2024-06-30 RX ADMIN — CEFTRIAXONE SODIUM 1000 MG: 1 INJECTION, POWDER, FOR SOLUTION INTRAMUSCULAR; INTRAVENOUS at 16:51

## 2024-06-30 RX ADMIN — IOPAMIDOL 100 ML: 755 INJECTION, SOLUTION INTRAVENOUS at 15:06

## 2024-06-30 RX ADMIN — ONDANSETRON 4 MG: 2 INJECTION INTRAMUSCULAR; INTRAVENOUS at 14:49

## 2024-06-30 RX ADMIN — HYDROMORPHONE HYDROCHLORIDE 1 MG: 1 INJECTION, SOLUTION INTRAMUSCULAR; INTRAVENOUS; SUBCUTANEOUS at 14:49

## 2024-06-30 RX ADMIN — OXYCODONE AND ACETAMINOPHEN 1 TABLET: 5; 325 TABLET ORAL at 16:51

## 2024-06-30 NOTE — ED PROVIDER NOTES
"SHARED VISIT NOTE:    Patient is 63 y.o. year old female that presents to the ED for evaluation of flank pain.     Physical Exam    ED Course:    /72   Pulse 69   Temp 98.8 °F (37.1 °C) (Oral)   Resp 18   Ht 165.1 cm (65\")   Wt 58.5 kg (128 lb 15.5 oz)   SpO2 96%   BMI 21.46 kg/m²   Results for orders placed or performed during the hospital encounter of 06/30/24   Comprehensive Metabolic Panel    Specimen: Blood   Result Value Ref Range    Glucose 124 (H) 65 - 99 mg/dL    BUN 10 8 - 23 mg/dL    Creatinine 0.75 0.57 - 1.00 mg/dL    Sodium 137 136 - 145 mmol/L    Potassium 3.2 (L) 3.5 - 5.2 mmol/L    Chloride 100 98 - 107 mmol/L    CO2 24.5 22.0 - 29.0 mmol/L    Calcium 9.3 8.6 - 10.5 mg/dL    Total Protein 6.6 6.0 - 8.5 g/dL    Albumin 4.1 3.5 - 5.2 g/dL    ALT (SGPT) 20 1 - 33 U/L    AST (SGOT) 18 1 - 32 U/L    Alkaline Phosphatase 109 39 - 117 U/L    Total Bilirubin 0.3 0.0 - 1.2 mg/dL    Globulin 2.5 gm/dL    A/G Ratio 1.6 g/dL    BUN/Creatinine Ratio 13.3 7.0 - 25.0    Anion Gap 12.5 5.0 - 15.0 mmol/L    eGFR 88.5 >60.0 mL/min/1.73   Lipase    Specimen: Blood   Result Value Ref Range    Lipase 14 13 - 60 U/L   Urinalysis With Microscopic If Indicated (No Culture) - Urine, Clean Catch    Specimen: Urine, Clean Catch   Result Value Ref Range    Color, UA Dark Yellow (A) Yellow, Straw    Appearance, UA Clear Clear    pH, UA 6.0 5.0 - 8.0    Specific Gravity, UA 1.026 1.005 - 1.030    Glucose, UA Negative Negative    Ketones, UA Trace (A) Negative    Bilirubin, UA Negative Negative    Blood, UA Small (1+) (A) Negative    Protein, UA 30 mg/dL (1+) (A) Negative    Leuk Esterase, UA Small (1+) (A) Negative    Nitrite, UA Negative Negative    Urobilinogen, UA 1.0 E.U./dL 0.2 - 1.0 E.U./dL   Lactic Acid, Plasma    Specimen: Blood   Result Value Ref Range    Lactate 1.7 0.5 - 2.0 mmol/L   CBC Auto Differential    Specimen: Blood   Result Value Ref Range    WBC 5.79 3.40 - 10.80 10*3/mm3    RBC 4.31 3.77 - 5.28 " 10*6/mm3    Hemoglobin 13.1 12.0 - 15.9 g/dL    Hematocrit 37.3 34.0 - 46.6 %    MCV 86.5 79.0 - 97.0 fL    MCH 30.4 26.6 - 33.0 pg    MCHC 35.1 31.5 - 35.7 g/dL    RDW 12.9 12.3 - 15.4 %    RDW-SD 40.4 37.0 - 54.0 fl    MPV 11.5 6.0 - 12.0 fL    Platelets 274 140 - 450 10*3/mm3    Neutrophil % 54.5 42.7 - 76.0 %    Lymphocyte % 33.9 19.6 - 45.3 %    Monocyte % 8.3 5.0 - 12.0 %    Eosinophil % 2.4 0.3 - 6.2 %    Basophil % 0.7 0.0 - 1.5 %    Immature Grans % 0.2 0.0 - 0.5 %    Neutrophils, Absolute 3.16 1.70 - 7.00 10*3/mm3    Lymphocytes, Absolute 1.96 0.70 - 3.10 10*3/mm3    Monocytes, Absolute 0.48 0.10 - 0.90 10*3/mm3    Eosinophils, Absolute 0.14 0.00 - 0.40 10*3/mm3    Basophils, Absolute 0.04 0.00 - 0.20 10*3/mm3    Immature Grans, Absolute 0.01 0.00 - 0.05 10*3/mm3    nRBC 0.0 0.0 - 0.2 /100 WBC   Urinalysis, Microscopic Only - Urine, Clean Catch    Specimen: Urine, Clean Catch   Result Value Ref Range    RBC, UA 11-20 (A) None Seen, 0-2 /HPF    WBC, UA 21-50 (A) None Seen, 0-2 /HPF    Bacteria, UA None Seen None Seen /HPF    Squamous Epithelial Cells, UA 3-6 (A) None Seen, 0-2 /HPF    Hyaline Casts, UA 21-30 None Seen /LPF    Methodology Automated Microscopy    Green Top (Gel)   Result Value Ref Range    Extra Tube Hold for add-ons.    Lavender Top   Result Value Ref Range    Extra Tube hold for add-on    Gold Top - SST   Result Value Ref Range    Extra Tube Hold for add-ons.    Light Blue Top   Result Value Ref Range    Extra Tube Hold for add-ons.      Medications   sodium chloride 0.9 % flush 10 mL (has no administration in time range)   cefTRIAXone (ROCEPHIN) 1,000 mg in sodium chloride 0.9 % 100 mL IVPB-VTB (has no administration in time range)   oxyCODONE-acetaminophen (PERCOCET) 5-325 MG per tablet 1 tablet (has no administration in time range)   ondansetron (ZOFRAN) injection 4 mg (4 mg Intravenous Given 6/30/24 1449)   HYDROmorphone (DILAUDID) injection 1 mg (1 mg Intravenous Given 6/30/24 1449)    iopamidol (ISOVUE-370) 76 % injection 100 mL (100 mL Intravenous Given 6/30/24 1506)     CT Abdomen Pelvis With Contrast    Result Date: 6/30/2024  Narrative: CT ABDOMEN PELVIS W CONTRAST Date of Exam: 6/30/2024 2:50 PM EDT Indication: gen abd pain/n/v/d. Comparison: February 5, 2024 Technique: Axial CT images were obtained of the abdomen and pelvis after the uneventful intravenous administration of iodinated contrast. Reconstructed coronal and sagittal images were also obtained. Automated exposure control and iterative construction methods were used. Findings: The lung bases are clear. The liver, adrenal glands, left kidney, spleen, and pancreas are unremarkable. The gallbladder is surgically absent. There is an 8 mm obstructing stone within the mid right ureter, with moderate right hydronephrosis. The stomach appears normal. The small bowel appears normal in caliber and configuration. There are changes from right hemicolectomy, and the residual colon is unremarkable. There is no ascites or loculated collection. No abnormally enlarged lymph nodes are identified. The rectum, uterus, and urinary bladder are unremarkable. No aggressive osseous lesions are identified. There is fusion hardware within the visualized spine and the right iliac bone. A left hip arthroplasty is present. A spinal stimulator device is noted. There is dextroscoliosis of the mid lumbar spine. There is a stable moderate chronic compression deformity at T10.     Impression: Impression: 8 mm obstructing stone within mid right ureter, with moderate right hydronephrosis. Electronically Signed: Artem Flores MD  6/30/2024 3:19 PM EDT  Workstation ID: [id number]     MDM:    Procedures          SHARED VISIT ATTESTATION:    This visit was performed by both myself and an APC.  I performed the substantive portion of the medical decision making. The management plan was made or approved by me, and I take responsibility for patient management.            Maximo Jenkins DO  16:21 EDT  06/30/24         Maximo Jenkins DO  06/30/24 2131

## 2024-06-30 NOTE — DISCHARGE INSTRUCTIONS
You have 8 mm kidney stone in your right mid ureter  Have consulted with urology who will call you first thing tomorrow morning to schedule surgery for tomorrow.  Please keep your phone nearby    Please do not eat or drink after midnight tonight    Please take Flomax tomorrow along with antibiotics, pain meds as needed and Zofran for nausea and vomiting

## 2024-06-30 NOTE — ED PROVIDER NOTES
Time: 2:27 PM EDT  Date of encounter:  6/30/2024  Independent Historian/Clinical History and Information was obtained by:   Patient and Family    History is limited by: N/A    Chief Complaint   Patient presents with    Abdominal Pain         History of Present Illness:  Patient is a 63 y.o. year old female who presents to the emergency department for evaluation of lysed abdominal pain that has worsened within the last week.  Patient states the pain is sharp and dull.  She has a history of back injuries and takes 10 mg oxycodone 6 times per day prescribed by pain management.  She has a history of diverticulitis.  Patient has had a colon resection in 2012 due to a perforation.  She admits to intermittent nausea and vomiting.  Admits to diarrhea that is not bloody.  Family member states that she has lost about 20 pounds in the last couple weeks.  A total of 40 pound weight loss in 5 months.  Patient admits to intermittent dysuria. Denies hematuria    Patient Care Team  Primary Care Provider: Provider, No Known    Past Medical History:     Allergies   Allergen Reactions    Duloxetine Hcl Itching    Erythromycin Hives    Morphine Hives    Sulfa Antibiotics Hives    Melatonin Unknown - Low Severity     Past Medical History:   Diagnosis Date    Arthritis     Colon perforation 2012    Depression     GERD     Hypothyroidism     MAMMO     Neuropathy     Hands/ Feet    Osteoporosis     PAP     Rheumatoid aortitis     Scoliosis 2010    Stomach ulcer 2012     Past Surgical History:   Procedure Laterality Date    BACK SURGERY  [id number]    6 surgeries    COLON SURGERY      Colon Resection    COLONOSCOPY       Family History   Problem Relation Age of Onset    Arthritis Mother     Cancer Mother 58        Colon Cancer    Osteoporosis Mother     Hypertension Mother     Stroke Mother     Hyperlipidemia Mother     Thyroid disease Mother     Liver disease Father     Alcohol abuse Father     Thyroid disease Sister     Cancer Maternal  Aunt         lung       Home Medications:  Prior to Admission medications    Medication Sig Start Date End Date Taking? Authorizing Provider   atorvastatin (LIPITOR) 80 MG tablet Take 1 tablet by mouth Every Night. 10/17/20   Thomas Pierre MD   cyclobenzaprine (FLEXERIL) 5 MG tablet Take 1 tablet by mouth 2 (Two) Times a Day As Needed for Muscle Spasms.    Thomas Pierre MD   famotidine (PEPCID) 20 MG tablet Take 1 tablet by mouth 2 (Two) Times a Day.    Thomas Pierre MD   FLUoxetine (PROzac) 40 MG capsule Take 1 capsule by mouth Daily.    Thomas Pierre MD   gabapentin (NEURONTIN) 100 MG capsule Take 1 capsule by mouth 2 (Two) Times a Day.    Thomas Pierre MD   levothyroxine (SYNTHROID, LEVOTHROID) 150 MCG tablet Take 1 tablet by mouth Daily.    Thomas Pierre MD   ondansetron ODT (ZOFRAN-ODT) 4 MG disintegrating tablet Place 1 tablet on the tongue Every 8 (Eight) Hours As Needed for Nausea or Vomiting. 24   Jong Estrada PA-C   oxyCODONE (ROXICODONE) 10 MG tablet Take 1 tablet by mouth Every 4 (Four) Hours As Needed. 23   Thomas Pierre MD        Social History:   Social History     Tobacco Use    Smoking status: Former     Current packs/day: 0.00     Average packs/day: 0.5 packs/day for 25.0 years (12.5 ttl pk-yrs)     Types: Cigarettes     Start date:      Quit date: 2010     Years since quittin.5    Smokeless tobacco: Never   Vaping Use    Vaping status: Never Used   Substance Use Topics    Alcohol use: Never    Drug use: Not Currently     Comment: Marijuana ---- occasionally         Review of Systems:  Review of Systems   Constitutional:  Positive for unexpected weight change.   HENT: Negative.     Eyes: Negative.    Respiratory: Negative.     Cardiovascular: Negative.    Gastrointestinal:  Positive for abdominal pain, diarrhea, nausea and vomiting.   Endocrine: Negative.    Genitourinary:  Positive for dysuria.   Musculoskeletal:  "Negative.    Skin: Negative.    Allergic/Immunologic: Negative.    Neurological: Negative.    Hematological: Negative.    Psychiatric/Behavioral: Negative.          Physical Exam:  /72   Pulse 69   Temp 98.8 °F (37.1 °C) (Oral)   Resp 18   Ht 165.1 cm (65\")   Wt 58.5 kg (128 lb 15.5 oz)   SpO2 96%   BMI 21.46 kg/m²         Physical Exam  Vitals and nursing note reviewed.   Constitutional:       Appearance: Normal appearance. She is normal weight.   HENT:      Head: Normocephalic and atraumatic.      Nose: Nose normal.      Mouth/Throat:      Mouth: Mucous membranes are moist.   Eyes:      Extraocular Movements: Extraocular movements intact.      Conjunctiva/sclera: Conjunctivae normal.      Pupils: Pupils are equal, round, and reactive to light.   Cardiovascular:      Rate and Rhythm: Normal rate and regular rhythm.      Heart sounds: Normal heart sounds.   Pulmonary:      Effort: Pulmonary effort is normal.      Breath sounds: Normal breath sounds.   Abdominal:      General: Abdomen is flat. Bowel sounds are normal.      Palpations: Abdomen is soft.      Tenderness: There is abdominal tenderness.   Musculoskeletal:         General: Normal range of motion.      Cervical back: Normal range of motion and neck supple.   Skin:     General: Skin is warm and dry.   Neurological:      General: No focal deficit present.      Mental Status: She is alert and oriented to person, place, and time.   Psychiatric:         Mood and Affect: Mood normal.         Behavior: Behavior normal.                  Procedures:  Procedures      Medical Decision Making:      Comorbidities that affect care:    Colon perforation    External Notes reviewed:    Previous ED Note: Outside ED visit 2/16/2024      The following orders were placed and all results were independently analyzed by me:  Orders Placed This Encounter   Procedures    CT Abdomen Pelvis With Contrast    Kersey Draw    Comprehensive Metabolic Panel    Lipase    " Urinalysis With Microscopic If Indicated (No Culture) - Urine, Clean Catch    Lactic Acid, Plasma    CBC Auto Differential    Urinalysis, Microscopic Only - Urine, Clean Catch    NPO Diet NPO Type: Strict NPO    Undress & Gown    Inpatient Urology Consult    Insert Peripheral IV    CBC & Differential    Green Top (Gel)    Lavender Top    Gold Top - SST    Light Blue Top       Medications Given in the Emergency Department:  Medications   sodium chloride 0.9 % flush 10 mL (has no administration in time range)   ondansetron (ZOFRAN) injection 4 mg (4 mg Intravenous Given 6/30/24 1449)   HYDROmorphone (DILAUDID) injection 1 mg (1 mg Intravenous Given 6/30/24 1449)   iopamidol (ISOVUE-370) 76 % injection 100 mL (100 mL Intravenous Given 6/30/24 1506)        ED Course:    The patient was initially evaluated in the triage area where orders were placed. The patient was later dispositioned by Christa Wise PA-C.      The patient was advised to stay for completion of workup which includes but is not limited to communication of labs and radiological results, reassessment and plan. The patient was advised that leaving prior to disposition by a provider could result in critical findings that are not communicated to the patient.          Labs:    Lab Results (last 24 hours)       Procedure Component Value Units Date/Time    CBC & Differential [[id number]]  (Normal) Collected: 06/30/24 1357    Specimen: Blood Updated: 06/30/24 1087    Narrative:      The following orders were created for panel order CBC & Differential.  Procedure                               Abnormality         Status                     ---------                               -----------         ------                     CBC Auto Differential[[id number]]        Normal              Final result                 Please view results for these tests on the individual orders.    Comprehensive Metabolic Panel [[id number]]  (Abnormal) Collected: 06/30/24 7077     Specimen: Blood Updated: 06/30/24 1427     Glucose 124 mg/dL      BUN 10 mg/dL      Creatinine 0.75 mg/dL      Sodium 137 mmol/L      Potassium 3.2 mmol/L      Chloride 100 mmol/L      CO2 24.5 mmol/L      Calcium 9.3 mg/dL      Total Protein 6.6 g/dL      Albumin 4.1 g/dL      ALT (SGPT) 20 U/L      AST (SGOT) 18 U/L      Alkaline Phosphatase 109 U/L      Total Bilirubin 0.3 mg/dL      Globulin 2.5 gm/dL      A/G Ratio 1.6 g/dL      BUN/Creatinine Ratio 13.3     Anion Gap 12.5 mmol/L      eGFR 88.5 mL/min/1.73     Narrative:      GFR Normal >60  Chronic Kidney Disease <60  Kidney Failure <15      Lipase [[id number]]  (Normal) Collected: 06/30/24 1357    Specimen: Blood Updated: 06/30/24 1427     Lipase 14 U/L     Lactic Acid, Plasma [[id number]]  (Normal) Collected: 06/30/24 1357    Specimen: Blood Updated: 06/30/24 1424     Lactate 1.7 mmol/L     CBC Auto Differential [[id number]]  (Normal) Collected: 06/30/24 1357    Specimen: Blood Updated: 06/30/24 1407     WBC 5.79 10*3/mm3      RBC 4.31 10*6/mm3      Hemoglobin 13.1 g/dL      Hematocrit 37.3 %      MCV 86.5 fL      MCH 30.4 pg      MCHC 35.1 g/dL      RDW 12.9 %      RDW-SD 40.4 fl      MPV 11.5 fL      Platelets 274 10*3/mm3      Neutrophil % 54.5 %      Lymphocyte % 33.9 %      Monocyte % 8.3 %      Eosinophil % 2.4 %      Basophil % 0.7 %      Immature Grans % 0.2 %      Neutrophils, Absolute 3.16 10*3/mm3      Lymphocytes, Absolute 1.96 10*3/mm3      Monocytes, Absolute 0.48 10*3/mm3      Eosinophils, Absolute 0.14 10*3/mm3      Basophils, Absolute 0.04 10*3/mm3      Immature Grans, Absolute 0.01 10*3/mm3      nRBC 0.0 /100 WBC     Urinalysis With Microscopic If Indicated (No Culture) - Urine, Clean Catch [[id number]]  (Abnormal) Collected: 06/30/24 1413    Specimen: Urine, Clean Catch Updated: 06/30/24 1432     Color, UA Dark Yellow     Appearance, UA Clear     pH, UA 6.0     Specific Gravity, UA 1.026     Glucose, UA Negative     Ketones, UA Trace      Bilirubin, UA Negative     Blood, UA Small (1+)     Protein, UA 30 mg/dL (1+)     Leuk Esterase, UA Small (1+)     Nitrite, UA Negative     Urobilinogen, UA 1.0 E.U./dL    Urinalysis, Microscopic Only - Urine, Clean Catch [[id number]]  (Abnormal) Collected: 06/30/24 1413    Specimen: Urine, Clean Catch Updated: 06/30/24 1432     RBC, UA 11-20 /HPF      WBC, UA 21-50 /HPF      Bacteria, UA None Seen /HPF      Squamous Epithelial Cells, UA 3-6 /HPF      Hyaline Casts, UA 21-30 /LPF      Methodology Automated Microscopy             Imaging:    CT Abdomen Pelvis With Contrast    Result Date: 6/30/2024  CT ABDOMEN PELVIS W CONTRAST Date of Exam: 6/30/2024 2:50 PM EDT Indication: gen abd pain/n/v/d. Comparison: February 5, 2024 Technique: Axial CT images were obtained of the abdomen and pelvis after the uneventful intravenous administration of iodinated contrast. Reconstructed coronal and sagittal images were also obtained. Automated exposure control and iterative construction methods were used. Findings: The lung bases are clear. The liver, adrenal glands, left kidney, spleen, and pancreas are unremarkable. The gallbladder is surgically absent. There is an 8 mm obstructing stone within the mid right ureter, with moderate right hydronephrosis. The stomach appears normal. The small bowel appears normal in caliber and configuration. There are changes from right hemicolectomy, and the residual colon is unremarkable. There is no ascites or loculated collection. No abnormally enlarged lymph nodes are identified. The rectum, uterus, and urinary bladder are unremarkable. No aggressive osseous lesions are identified. There is fusion hardware within the visualized spine and the right iliac bone. A left hip arthroplasty is present. A spinal stimulator device is noted. There is dextroscoliosis of the mid lumbar spine. There is a stable moderate chronic compression deformity at T10.     Impression: 8 mm obstructing stone within mid  right ureter, with moderate right hydronephrosis. Electronically Signed: Artem Flores MD  6/30/2024 3:19 PM EDT  Workstation ID: [id number]       Differential Diagnosis and Discussion:      Abdominal Pain: Based on the patient's signs and symptoms, I considered abdominal aortic aneurysm, small bowel obstruction, pancreatitis, acute cholecystitis, acute appendecitis, peptic ulcer disease, gastritis, colitis, endocrine disorders, irritable bowel syndrome and other differential diagnosis an etiology of the patient's abdominal pain.  Diarrhea: Differential diagnosis includes but is not limited to malabsorption syndrome, bacterial infection, carcinoid syndrome, pancreatic hypersecretion, viral infection, celiac sprue, Crohn's disease, ulcerative colitis, ischemic colitis, colitis, hypermotility, and irritable bowel syndrome.  Dysuria: Differential diagnosis includes but is not limited to urethritis, cystitis, pyelonephritis, ureteral calculi, neoplasm, chemical irritant, urethral stricture, and trauma  Vomiting: Differential diagnosis includes but is not limited to migraine, labyrinthine disorders, psychogenic, metabolic and endocrine causes, peptic ulcer, gastric outlet obstruction, gastritis, gastroenteritis, appendicitis, intestinal obstruction, paralytic ileus, food poisoning, cholecystitis, acute hepatitis, acute pancreatitis, acute febrile illness, and myocardial infarction.    All labs were reviewed and interpreted by me.  CT scan radiology impression was interpreted by me.    MDM               Patient Care Considerations:    SEPSIS was considered but is NOT present in the emergency department as SIRS criteria is not present.      Consultants/Shared Management Plan:    SHARED VISIT: I have discussed the case with my supervising physician, Dr. Jenkins who stateswill send pain meds . The substantive portion of the medical decision was made by the attesting physician who made or approve the management plan and will  take responsibility for the patient.  Clinical findings were discussed and ultimate disposition was made in consult with supervising physician.  Consultant: I have discussed the case with Dr. La, urologist who states patient can be discharged home n.p.o. after midnight with analgesics and Flomax.  Her office will call patient first thing in the morning to schedule OR for tomorrow.    Social Determinants of Health:    Patient has presented with family members who are responsible, reliable and will ensure follow up care.      Disposition and Care Coordination:    Discharged: I considered escalation of care by admitting this patient to the hospital, however urologist said patient can be discharged home if pain is controlled and go to OR tomorrow    I have explained the patient´s condition, diagnoses and treatment plan based on the information available to me at this time. I have answered questions and addressed any concerns. The patient has a good  understanding of the patient´s diagnosis, condition, and treatment plan as can be expected at this point. The vital signs have been stable. The patient´s condition is stable and appropriate for discharge from the emergency department.      The patient will pursue further outpatient evaluation with the primary care physician or other designated or consulting physician as outlined in the discharge instructions. They are agreeable to this plan of care and follow-up instructions have been explained in detail. The patient has received these instructions in written format and has expressed an understanding of the discharge instructions. The patient is aware that any significant change in condition or worsening of symptoms should prompt an immediate return to this or the closest emergency department or call to 911.  I have explained discharge medications and the need for follow up with the patient/caretakers. This was also printed in the discharge instructions. Patient was  discharged with the following medications and follow up:      Medication List        New Prescriptions      cephalexin 500 MG capsule  Commonly known as: KEFLEX  Take 1 capsule by mouth 2 (Two) Times a Day for 7 days.     ondansetron ODT 4 MG disintegrating tablet  Commonly known as: ZOFRAN-ODT  Place 1 tablet on the tongue Every 8 (Eight) Hours As Needed for Nausea or Vomiting.     tamsulosin 0.4 MG capsule 24 hr capsule  Commonly known as: FLOMAX  Take 1 capsule by mouth Daily.               Where to Get Your Medications        These medications were sent to [id number] - [place], [place] - [address] AT [facility] [phone]  - [phone] FX  [address] [place] [address]      Phone: [phone]   cephalexin 500 MG capsule  ondansetron ODT 4 MG disintegrating tablet  tamsulosin 0.4 MG capsule 24 hr capsule      Ruma La MD  [address] [place] [address]  [phone]          Ruma La MD  [address] [place] [address]  [phone]             Final diagnoses:   Ureterolithiasis        ED Disposition       None            This medical record created using voice recognition software.             Christa Wise PA-C  06/30/24 8014

## 2024-07-01 ENCOUNTER — ANESTHESIA (OUTPATIENT)
Dept: PERIOP | Facility: HOSPITAL | Age: 64
End: 2024-07-01
Payer: MEDICARE

## 2024-07-01 ENCOUNTER — APPOINTMENT (OUTPATIENT)
Dept: GENERAL RADIOLOGY | Facility: HOSPITAL | Age: 64
End: 2024-07-01
Payer: MEDICARE

## 2024-07-01 ENCOUNTER — ANESTHESIA EVENT (OUTPATIENT)
Dept: PERIOP | Facility: HOSPITAL | Age: 64
End: 2024-07-01
Payer: MEDICARE

## 2024-07-01 ENCOUNTER — HOSPITAL ENCOUNTER (OUTPATIENT)
Facility: HOSPITAL | Age: 64
Setting detail: HOSPITAL OUTPATIENT SURGERY
Discharge: HOME | End: 2024-07-01
Attending: UROLOGY | Admitting: UROLOGY
Payer: MEDICARE

## 2024-07-01 VITALS
TEMPERATURE: 97.5 F | WEIGHT: 128.09 LBS | SYSTOLIC BLOOD PRESSURE: 135 MMHG | HEIGHT: 65 IN | OXYGEN SATURATION: 98 % | BODY MASS INDEX: 21.34 KG/M2 | HEART RATE: 70 BPM | DIASTOLIC BLOOD PRESSURE: 62 MMHG | RESPIRATION RATE: 18 BRPM

## 2024-07-01 DIAGNOSIS — N20.1 RIGHT URETERAL STONE: ICD-10-CM

## 2024-07-01 DIAGNOSIS — N20.1 URETEROLITHIASIS: ICD-10-CM

## 2024-07-01 PROCEDURE — 25010000002 DEXAMETHASONE PER 1 MG

## 2024-07-01 PROCEDURE — 25010000002 FENTANYL CITRATE (PF) 50 MCG/ML SOLUTION

## 2024-07-01 PROCEDURE — 25010000002 HYDROMORPHONE 1 MG/ML SOLUTION: Performed by: ANESTHESIOLOGY

## 2024-07-01 PROCEDURE — 25810000003 LACTATED RINGERS PER 1000 ML: Performed by: ANESTHESIOLOGY

## 2024-07-01 PROCEDURE — C1894 INTRO/SHEATH, NON-LASER: HCPCS | Performed by: UROLOGY

## 2024-07-01 PROCEDURE — 82365 CALCULUS SPECTROSCOPY: CPT | Performed by: UROLOGY

## 2024-07-01 PROCEDURE — 25010000002 ONDANSETRON PER 1 MG

## 2024-07-01 PROCEDURE — C1769 GUIDE WIRE: HCPCS | Performed by: UROLOGY

## 2024-07-01 PROCEDURE — 25010000002 PROPOFOL 200 MG/20ML EMULSION

## 2024-07-01 PROCEDURE — C1758 CATHETER, URETERAL: HCPCS | Performed by: UROLOGY

## 2024-07-01 PROCEDURE — 88300 SURGICAL PATH GROSS: CPT | Performed by: UROLOGY

## 2024-07-01 PROCEDURE — 76000 FLUOROSCOPY <1 HR PHYS/QHP: CPT

## 2024-07-01 PROCEDURE — 25510000001 IOPAMIDOL PER 1 ML: Performed by: UROLOGY

## 2024-07-01 PROCEDURE — 25010000002 CEFAZOLIN PER 500 MG: Performed by: UROLOGY

## 2024-07-01 PROCEDURE — 25010000002 MEPERIDINE PER 100 MG

## 2024-07-01 PROCEDURE — C2617 STENT, NON-COR, TEM W/O DEL: HCPCS | Performed by: UROLOGY

## 2024-07-01 DEVICE — URETERAL STENT
Type: IMPLANTABLE DEVICE | Site: URETER | Status: FUNCTIONAL
Brand: ASCERTA™

## 2024-07-01 RX ORDER — ONDANSETRON 2 MG/ML
4 INJECTION INTRAMUSCULAR; INTRAVENOUS ONCE AS NEEDED
Status: DISCONTINUED | OUTPATIENT
Start: 2024-07-01 | End: 2024-07-01 | Stop reason: HOSPADM

## 2024-07-01 RX ORDER — MEPERIDINE HYDROCHLORIDE 25 MG/ML
12.5 INJECTION INTRAMUSCULAR; INTRAVENOUS; SUBCUTANEOUS
Status: COMPLETED | OUTPATIENT
Start: 2024-07-01 | End: 2024-07-01

## 2024-07-01 RX ORDER — NALOXONE HYDROCHLORIDE 4 MG/.1ML
SPRAY NASAL
Qty: 2 EACH | Refills: 0 | Status: SHIPPED | OUTPATIENT
Start: 2024-07-01

## 2024-07-01 RX ORDER — ACETAMINOPHEN 325 MG/1
650 TABLET ORAL ONCE
Status: DISCONTINUED | OUTPATIENT
Start: 2024-07-01 | End: 2024-07-01 | Stop reason: HOSPADM

## 2024-07-01 RX ORDER — MAGNESIUM HYDROXIDE 1200 MG/15ML
LIQUID ORAL AS NEEDED
Status: DISCONTINUED | OUTPATIENT
Start: 2024-07-01 | End: 2024-07-01 | Stop reason: HOSPADM

## 2024-07-01 RX ORDER — PROMETHAZINE HYDROCHLORIDE 12.5 MG/1
25 TABLET ORAL ONCE AS NEEDED
Status: DISCONTINUED | OUTPATIENT
Start: 2024-07-01 | End: 2024-07-01 | Stop reason: HOSPADM

## 2024-07-01 RX ORDER — ONDANSETRON 2 MG/ML
4 INJECTION INTRAMUSCULAR; INTRAVENOUS ONCE AS NEEDED
Status: DISCONTINUED | OUTPATIENT
Start: 2024-07-01 | End: 2024-07-01 | Stop reason: SDUPTHER

## 2024-07-01 RX ORDER — ONDANSETRON 4 MG/1
4 TABLET, ORALLY DISINTEGRATING ORAL ONCE AS NEEDED
Status: DISCONTINUED | OUTPATIENT
Start: 2024-07-01 | End: 2024-07-01 | Stop reason: HOSPADM

## 2024-07-01 RX ORDER — DEXAMETHASONE SODIUM PHOSPHATE 4 MG/ML
INJECTION, SOLUTION INTRA-ARTICULAR; INTRALESIONAL; INTRAMUSCULAR; INTRAVENOUS; SOFT TISSUE AS NEEDED
Status: DISCONTINUED | OUTPATIENT
Start: 2024-07-01 | End: 2024-07-01 | Stop reason: SURG

## 2024-07-01 RX ORDER — ONDANSETRON 2 MG/ML
INJECTION INTRAMUSCULAR; INTRAVENOUS AS NEEDED
Status: DISCONTINUED | OUTPATIENT
Start: 2024-07-01 | End: 2024-07-01 | Stop reason: SURG

## 2024-07-01 RX ORDER — SODIUM CHLORIDE, SODIUM LACTATE, POTASSIUM CHLORIDE, CALCIUM CHLORIDE 600; 310; 30; 20 MG/100ML; MG/100ML; MG/100ML; MG/100ML
9 INJECTION, SOLUTION INTRAVENOUS CONTINUOUS PRN
Status: DISCONTINUED | OUTPATIENT
Start: 2024-07-01 | End: 2024-07-01 | Stop reason: HOSPADM

## 2024-07-01 RX ORDER — PROMETHAZINE HYDROCHLORIDE 25 MG/1
25 SUPPOSITORY RECTAL ONCE AS NEEDED
Status: DISCONTINUED | OUTPATIENT
Start: 2024-07-01 | End: 2024-07-01 | Stop reason: HOSPADM

## 2024-07-01 RX ORDER — PHENAZOPYRIDINE HYDROCHLORIDE 200 MG/1
200 TABLET, FILM COATED ORAL 3 TIMES DAILY PRN
Qty: 20 TABLET | Refills: 0 | Status: SHIPPED | OUTPATIENT
Start: 2024-07-01

## 2024-07-01 RX ORDER — OXYBUTYNIN CHLORIDE 5 MG/1
5 TABLET ORAL 3 TIMES DAILY PRN
Qty: 12 TABLET | Refills: 0 | Status: SHIPPED | OUTPATIENT
Start: 2024-07-01

## 2024-07-01 RX ORDER — MEPERIDINE HYDROCHLORIDE 25 MG/ML
12.5 INJECTION INTRAMUSCULAR; INTRAVENOUS; SUBCUTANEOUS
Status: DISCONTINUED | OUTPATIENT
Start: 2024-07-01 | End: 2024-07-01 | Stop reason: HOSPADM

## 2024-07-01 RX ORDER — PROMETHAZINE HYDROCHLORIDE 12.5 MG/1
25 TABLET ORAL ONCE AS NEEDED
Status: DISCONTINUED | OUTPATIENT
Start: 2024-07-01 | End: 2024-07-01 | Stop reason: SDUPTHER

## 2024-07-01 RX ORDER — TAMSULOSIN HYDROCHLORIDE 0.4 MG/1
1 CAPSULE ORAL DAILY
Qty: 12 CAPSULE | Refills: 0 | Status: SHIPPED | OUTPATIENT
Start: 2024-07-01

## 2024-07-01 RX ORDER — LIDOCAINE HYDROCHLORIDE 20 MG/ML
INJECTION, SOLUTION EPIDURAL; INFILTRATION; INTRACAUDAL; PERINEURAL AS NEEDED
Status: DISCONTINUED | OUTPATIENT
Start: 2024-07-01 | End: 2024-07-01 | Stop reason: SURG

## 2024-07-01 RX ORDER — PROMETHAZINE HYDROCHLORIDE 12.5 MG/1
12.5 TABLET ORAL ONCE AS NEEDED
Status: DISCONTINUED | OUTPATIENT
Start: 2024-07-01 | End: 2024-07-01 | Stop reason: SDUPTHER

## 2024-07-01 RX ORDER — IBUPROFEN 600 MG/1
600 TABLET ORAL EVERY 6 HOURS PRN
Status: DISCONTINUED | OUTPATIENT
Start: 2024-07-01 | End: 2024-07-01 | Stop reason: HOSPADM

## 2024-07-01 RX ORDER — PROMETHAZINE HYDROCHLORIDE 25 MG/1
25 SUPPOSITORY RECTAL ONCE AS NEEDED
Status: DISCONTINUED | OUTPATIENT
Start: 2024-07-01 | End: 2024-07-01 | Stop reason: SDUPTHER

## 2024-07-01 RX ORDER — OXYCODONE HYDROCHLORIDE AND ACETAMINOPHEN 5; 325 MG/1; MG/1
1-2 TABLET ORAL EVERY 6 HOURS PRN
Qty: 16 TABLET | Refills: 0 | Status: SHIPPED | OUTPATIENT
Start: 2024-07-01

## 2024-07-01 RX ORDER — FENTANYL CITRATE 50 UG/ML
INJECTION, SOLUTION INTRAMUSCULAR; INTRAVENOUS AS NEEDED
Status: DISCONTINUED | OUTPATIENT
Start: 2024-07-01 | End: 2024-07-01 | Stop reason: SURG

## 2024-07-01 RX ORDER — PROPOFOL 10 MG/ML
INJECTION, EMULSION INTRAVENOUS AS NEEDED
Status: DISCONTINUED | OUTPATIENT
Start: 2024-07-01 | End: 2024-07-01 | Stop reason: SURG

## 2024-07-01 RX ORDER — EPHEDRINE SULFATE 50 MG/ML
INJECTION INTRAVENOUS AS NEEDED
Status: DISCONTINUED | OUTPATIENT
Start: 2024-07-01 | End: 2024-07-01 | Stop reason: SURG

## 2024-07-01 RX ORDER — OXYCODONE HYDROCHLORIDE 5 MG/1
5 TABLET ORAL
Status: DISCONTINUED | OUTPATIENT
Start: 2024-07-01 | End: 2024-07-01 | Stop reason: HOSPADM

## 2024-07-01 RX ORDER — ACETAMINOPHEN 500 MG
1000 TABLET ORAL ONCE
Status: COMPLETED | OUTPATIENT
Start: 2024-07-01 | End: 2024-07-01

## 2024-07-01 RX ADMIN — ONDANSETRON 4 MG: 2 INJECTION INTRAMUSCULAR; INTRAVENOUS at 13:55

## 2024-07-01 RX ADMIN — HYDROMORPHONE HYDROCHLORIDE 0.5 MG: 1 INJECTION, SOLUTION INTRAMUSCULAR; INTRAVENOUS; SUBCUTANEOUS at 12:15

## 2024-07-01 RX ADMIN — LIDOCAINE HYDROCHLORIDE 60 MG: 20 INJECTION, SOLUTION EPIDURAL; INFILTRATION; INTRACAUDAL; PERINEURAL at 13:05

## 2024-07-01 RX ADMIN — IBUPROFEN 600 MG: 600 TABLET, FILM COATED ORAL at 14:39

## 2024-07-01 RX ADMIN — EPHEDRINE SULFATE 10 MG: 50 INJECTION INTRAVENOUS at 13:24

## 2024-07-01 RX ADMIN — HYDROMORPHONE HYDROCHLORIDE 0.25 MG: 1 INJECTION, SOLUTION INTRAMUSCULAR; INTRAVENOUS; SUBCUTANEOUS at 14:41

## 2024-07-01 RX ADMIN — ACETAMINOPHEN 1000 MG: 500 TABLET ORAL at 12:14

## 2024-07-01 RX ADMIN — MEPERIDINE HYDROCHLORIDE 12.5 MG: 25 INJECTION INTRAMUSCULAR; INTRAVENOUS; SUBCUTANEOUS at 14:11

## 2024-07-01 RX ADMIN — SODIUM CHLORIDE 2000 MG: 9 INJECTION, SOLUTION INTRAVENOUS at 13:12

## 2024-07-01 RX ADMIN — PROPOFOL 120 MG: 10 INJECTION, EMULSION INTRAVENOUS at 13:05

## 2024-07-01 RX ADMIN — ONDANSETRON HYDROCHLORIDE 4 MG: 2 SOLUTION INTRAMUSCULAR; INTRAVENOUS at 14:46

## 2024-07-01 RX ADMIN — FENTANYL CITRATE 100 MCG: 50 INJECTION, SOLUTION INTRAMUSCULAR; INTRAVENOUS at 13:05

## 2024-07-01 RX ADMIN — EPHEDRINE SULFATE 10 MG: 50 INJECTION INTRAVENOUS at 13:12

## 2024-07-01 RX ADMIN — SODIUM CHLORIDE, POTASSIUM CHLORIDE, SODIUM LACTATE AND CALCIUM CHLORIDE 9 ML/HR: 600; 310; 30; 20 INJECTION, SOLUTION INTRAVENOUS at 12:14

## 2024-07-01 RX ADMIN — MEPERIDINE HYDROCHLORIDE 12.5 MG: 25 INJECTION INTRAMUSCULAR; INTRAVENOUS; SUBCUTANEOUS at 14:36

## 2024-07-01 RX ADMIN — DEXAMETHASONE SODIUM PHOSPHATE 4 MG: 4 INJECTION, SOLUTION INTRAMUSCULAR; INTRAVENOUS at 13:12

## 2024-07-01 NOTE — ANESTHESIA PREPROCEDURE EVALUATION
Anesthesia Evaluation     Patient summary reviewed and Nursing notes reviewed   no history of anesthetic complications:   NPO Solid Status: > 8 hours  NPO Liquid Status: > 2 hours           Airway   Mallampati: I  TM distance: >3 FB  Neck ROM: full  Dental    (+) upper dentures    Pulmonary - normal exam   (+) a smoker Former,  Cardiovascular - normal exam  Exercise tolerance: good (4-7 METS)    (+) hypertension, hyperlipidemia      Neuro/Psych  (+) numbness, psychiatric history Anxiety and Depression  GI/Hepatic/Renal/Endo    (+) GERD, PUD, thyroid problem hypothyroidism    Musculoskeletal     (+) myalgias  Abdominal  - normal exam   Substance History - negative use     OB/GYN negative ob/gyn ROS         Other   arthritis,     ROS/Med Hx Other: PAT Nursing Notes unavailable.                Anesthesia Plan    ASA 3     general     intravenous induction     Anesthetic plan, risks, benefits, and alternatives have been provided, discussed and informed consent has been obtained with: patient.    Plan discussed with CRNA.    CODE STATUS:

## 2024-07-01 NOTE — ANESTHESIA POSTPROCEDURE EVALUATION
Patient: Kavita Garcia    Procedure Summary       Date: 07/01/24 Room / Location: [facility] OR 08 / [facility] MAIN OR    Anesthesia Start: 1259 Anesthesia Stop: 1408    Procedure: CYSTOSCOPY URETEROSCOPY RETROGRADE PYELOGRAM HOLMIUM LASER STENT INSERTION, right (Right) Diagnosis:       Right ureteral stone      (Right ureteral stone [N20.1])    Surgeons: Ruma La MD Provider: Tor Martinez MD    Anesthesia Type: general ASA Status: 3            Anesthesia Type: general    Vitals  Vitals Value Taken Time   /60 07/01/24 1455   Temp 36.5 °C (97.7 °F) 07/01/24 1455   Pulse 71 07/01/24 1455   Resp 18 07/01/24 1455   SpO2 97 % 07/01/24 1455           Post Anesthesia Care and Evaluation    Patient location during evaluation: bedside  Patient participation: complete - patient participated  Level of consciousness: awake  Pain management: adequate    Airway patency: patent  PONV Status: none  Cardiovascular status: acceptable and stable  Respiratory status: acceptable  Hydration status: acceptable

## 2024-07-01 NOTE — OP NOTE
Preoperative diagnosis  Right ureteral stone      Postoperative diagnosis  Right ureteral stone    Procedure performed  Cystoscopy, right retrograde pyelogram, ureteroscopy, laser lithotripsy, ureteral stent insertion     Attending surgeon  Ruma La MD     Anesthesia  General    EBL  0 mL    Complications  None    Specimen  Right ureteral Stone    Findings  Cystoscopy without abnormality, bilateral orthotopic ureters  Treatment of large right ureteral stone; no significant stone burden at conclusion of case  6 x 24 ureteral stent   String    Indications  63 y.o. female agreed to undergo the above named procedure after discussion of the alternatives, risks and benefits.   Informed consent was obtained.      Procedure  After informed consent was obtained, the patient was taken back to the  operating suite where general anesthesia was administered. Once the patient  was adequately anesthetized, they were placed in the dorsal lithotomy position.  The genitals were prepped and draped in a normal sterile fashion.  Rigid  cystoscope was inserted gently in the patient's urethra meatus, which passed  atraumatically into the bladder; pan cystoscopy was performed in a typical 360-degree manner, no mucosal abnormalities were noted.  Bilateral orthotopic ureters.  Pollack catheter was then placed in the distal right ureter and wire threaded through it into the renal pelvis.  Second wire was placed, Pollick reduced.  X-ray revealed the large ureteral stone in the proximal ureter.  One of the wires was secured to the drape as a safety wire for the remainder of the case.  The  access sheath was passed level of the proximal ureter.  The ureteroscope was then assembled and ureteroscopy  proceeded in a systematic manner until the stone was  encountered.  Once the stone was encountered, laser  lithotripsy was then initiated.  Laser lithotripsy was utilized to dust and  fragment the calculus into pieces.  Any sizable  fragments  were then basketed out. After adequate treatment of the stone, nephroscopy then proceeded in a systematic manner.  Contrast dye was injected ensuring thorough inspection of all calyces.  Retrograde pyelogram revealed moderate hydronephrosis, no filling defect.  Once there were no further stone fragments, only sediment and dust remained, ureteroscopy proceeded down the length of the ureter  with retrieval of the access sheath, leaving the safety wire in place. There were no ureteral calculi  or fragments.   After withdrawing the  ureteroscope, as well as the access sheath, the wire was back loaded through  the cystoscope and a 6 x 24 double J stent was placed over the wire under  direct visualization.  Upon retrieval of the wire, there was good proximal  coil noted on x-ray, as well as distal coil within the bladder. At this  point, the procedure was deemed terminated.  The patient's bladder was then emptied and the cystoscope removed.  The patient was then placed back in the supine position and awakened from general  anesthesia and transferred to the PACU in good condition.       Signed:  Ruma La MD  07/01/24  14:15 EDT

## 2024-07-01 NOTE — DISCHARGE INSTRUCTIONS
DISCHARGE INSTRUCTIONS  Ureteroscopy Lasertripsy      For your surgery you had:  General anesthesia (you may have a sore throat for the first 24 hours)    You may experience dizziness, drowsiness, or lightheadedness for several hours following surgery.  Do not stay alone today or tonight.  Limit your activity for 24 hours.  You should not drive or operate machinery, drink alcohol, or sign legally binding documents for 24 hours or while you are taking pain medication.  Resume your diet slowly.  Follow any special dietary instructions you may have been given by your doctor.     NOTIFY YOUR DOCTOR IF YOU EXPERIENCE ANY OF THE FOLLOWING:  Temperature greater than 101 degrees Fahrenheit  Shaking Chills  Redness or excessive drainage from incision  Nausea, vomiting and/or pain that is not controlled by prescribed medications  Increase in bleeding or bleeding that is excessive  Unable to urinate in 6 hours after surgery  If unable to reach your doctor, please go to the closest Emergency Room  Strain urine if instructed by physician.  Collect any fragments and take with you on your scheduled appointment. You may pass stone pieces or small blood clots.  Blood in your urine is normal.  It could be light pink to cherry color.  Drink 6-8 glasses of fluid each day to assist with passing of stone fragments.  Back pain is common.  It may feel like a dull ache or back spasm.  Urine will be bloody for several days.  Slight redness or bruising may be noticed on treated side.  If you have difficulty urinating, try sitting in a bathtub of warm water.    If you have a stent, it must be managed by your urologist.  Do NOT forget.  Medications per physician instructions as indicated on Discharge Medication Information Sheet.    Last dose of pain medication was given at:   .    SPECIAL INSTRUCTIONS:

## 2024-07-01 NOTE — H&P
[facility]   Urology Preop H&P Note    Patient Name: Kavita Garcia  : 1960  MRN: [id number]  Primary Care Physician:  Provider, No Known  Referring Physician: Ruma La MD  Date of admission: 2024    Subjective   Subjective     Reason for Consult/ Chief Complaint: Right ureteral stone [N20.1]    HPI:  Kavita Garcia is a 63 y.o. female history ofRight ureteral stone [N20.1] who presents for further management OR.  Presents for planned Procedure(s):  CYSTOSCOPY URETEROSCOPY RETROGRADE PYELOGRAM HOLMIUM LASER STENT INSERTION, right;  .  Procedure to be preformed on the right.  Risk, benefits, and alternatives discussed with patient prior to today.All questions were addressed after providing time for discussion.  Patient denies significant changes since last visit.  No new complaints today.    Review of Systems   All systems were reviewed and negative except for the above  Personal History     Past Medical History:   Diagnosis Date    Arthritis     Colon perforation     Depression     GERD     Hypothyroidism     MAMMO     Neuropathy     Hands/ Feet    Osteoporosis     PAP     Rheumatoid aortitis     Scoliosis 2010    Stomach ulcer        Past Surgical History:   Procedure Laterality Date    BACK SURGERY  [id number]    6 surgeries    COLON SURGERY      Colon Resection    COLONOSCOPY         Family History: family history includes Alcohol abuse in her father; Arthritis in her mother; Cancer in her maternal aunt; Cancer (age of onset: 58) in her mother; Hyperlipidemia in her mother; Hypertension in her mother; Liver disease in her father; Osteoporosis in her mother; Stroke in her mother; Thyroid disease in her mother and sister. Otherwise pertinent FHx was reviewed and not pertinent to current issue.    Social History:  reports that she quit smoking about 14 years ago. Her smoking use included cigarettes. She started smoking about 39 years ago. She has a 12.5 pack-year smoking history.  She has never used smokeless tobacco. She reports that she does not currently use drugs. She reports that she does not drink alcohol.    Home Medications:  FLUoxetine, atorvastatin, cephalexin, cyclobenzaprine, famotidine, gabapentin, levothyroxine, ondansetron ODT, oxyCODONE, oxyCODONE-acetaminophen, and tamsulosin    Allergies:  Allergies   Allergen Reactions    Duloxetine Hcl Itching    Erythromycin Hives    Morphine Hives    Sulfa Antibiotics Hives    Melatonin Unknown - Low Severity       Objective    Objective     Vitals:   Temp:  [97.4 °F (36.3 °C)-98.8 °F (37.1 °C)] 97.4 °F (36.3 °C)  Heart Rate:  [68-97] 85  Resp:  [18-20] 20  BP: (118-142)/(72-89) 142/75    Physical Exam:   Constitutional: Awake, alert   Respiratory: Clear, nonlabored respirations    Cardiovascular: Regular rate, no chest retractions   gastrointestinal: Appears soft, nontender     Results:    Assessment & Plan   Assessment / Plan     Brief Patient Summary:  Kavita Garcia is a 63 y.o. female who     Active Hospital Problems:  Active Hospital Problems    Diagnosis     **Right ureteral stone        Plan:   Proceed to the OR for planned procedure, Procedure(s):  CYSTOSCOPY URETEROSCOPY RETROGRADE PYELOGRAM HOLMIUM LASER STENT INSERTION, right,  ,     Risks, benefits and alternatives were discussed with patient including bleeding, infection, damage to surrounding structures, stone recurrence, stricture pain, further procedures or management, and risks of anesthesia including up to death.  Patient also notes understanding that if unable to reach the level of the stone or if significant purulent discharge noted upon initiation of procedure that stent will be placed in definitive stone management will be deferred until the collecting system is optimized.      Risk, benefits, and alternatives discussed with patient at length she is agreeable to proceed  Laterality identified, right  All questions addressed      Electronically signed by Ruma  MD Abhinav, 07/01/24, 11:15 AM EDT.

## 2024-07-02 LAB
BACTERIA SPEC AEROBE CULT: NORMAL
LAB AP CASE REPORT: NORMAL
LAB AP CLINICAL INFORMATION: NORMAL
PATH REPORT.FINAL DX SPEC: NORMAL
PATH REPORT.GROSS SPEC: NORMAL

## 2024-07-10 ENCOUNTER — PROCEDURE VISIT (OUTPATIENT)
Dept: UROLOGY | Facility: CLINIC | Age: 64
End: 2024-07-10
Payer: MEDICARE

## 2024-07-10 VITALS — BODY MASS INDEX: 20.83 KG/M2 | RESPIRATION RATE: 16 BRPM | HEIGHT: 66 IN | WEIGHT: 129.6 LBS

## 2024-07-10 DIAGNOSIS — N13.30 HYDRONEPHROSIS, UNSPECIFIED HYDRONEPHROSIS TYPE: ICD-10-CM

## 2024-07-10 DIAGNOSIS — T19.9XXA FOREIGN BODY IN GENITOURINARY TRACT, INITIAL ENCOUNTER: Primary | ICD-10-CM

## 2024-07-10 RX ORDER — GABAPENTIN 300 MG/1
CAPSULE ORAL
COMMUNITY
Start: 2024-07-06

## 2024-07-10 RX ORDER — LISINOPRIL 10 MG/1
TABLET ORAL
COMMUNITY
Start: 2024-07-06

## 2024-07-10 NOTE — PROGRESS NOTES
Preoperative diagnosis  Foreign body in genitourinary tract; hydronephrosis    Postoperative diagnosis  Same as above    Procedure  Flexible cystourethroscopy with stent removal    Attending surgeon  Ruma La MD     Anesthesia  2% lidocaine jelly intraurethrally    Complications  None    Specimen  None    Estimated blood loss  0cc    Indications  63 y.o. female who is status post right ureteroscopy with stone treatment who presents for stent removal.    Procedure  The patient was appropriately identified and brought to the cytoscopy suite. A timeout was undertaken documenting the correct patient, site, and procedure. The patient was prepped in a normal sterile fashion. A flexible cytoscope was then introduced into the bladder. The stent was identified and grasped with endoscopic graspers. The entire stent was removed and passed off the field. Patient tolerated the procedure well. There were no intraprocedural complications.     Plan  Tolerated procedure well.  Instructions provided.  Patient follow-up in 4 to 6 weeks with renal ultrasound prior or earlier as needed  All question addressed

## 2024-07-13 LAB
CALCIUM OXALATE DIHYDRATE MFR STONE IR: 30 %
COLOR STONE: NORMAL
COM MFR STONE: 60 %
COMPN STONE: NORMAL
HYDROXYAPATITE: 10 %
LABORATORY COMMENT REPORT: NORMAL
Lab: NORMAL
Lab: NORMAL
PHOTO: NORMAL
SIZE STONE: NORMAL MM
SPEC SOURCE SUBJ: NORMAL
STONE ANALYSIS-IMP: NORMAL
WT STONE: 157 MG

## 2024-07-16 ENCOUNTER — TELEPHONE (OUTPATIENT)
Dept: INTERNAL MEDICINE | Facility: CLINIC | Age: 64
End: 2024-07-16
Payer: MEDICAID

## 2024-07-16 NOTE — TELEPHONE ENCOUNTER
Caller: Kavita Garcia    Relationship to patient: Self    Best call back number: [phone]    Chief complaint: NEW PATIENT, PHYSICAL     Type of visit: NEW PATIENT     Requested date: ANY     Additional notes: PATIENTS DAUGHTER JAMAICA SANDHU IS A PATIENT OF [facility] AND SO IS THE REST OF THEIR FAMILY. DAUGHTER RECOMMENDED [facility] FOR A PROVIDER. HUB UNABLE TO SCHEDULE NEW PATIENT APPOINTMENTS AT THIS TIME WITH [facility]. DAUGHTER REQUESTED A NOTE BE SENT BACK TO [facility] TO SEE IF SHE WOULD BE WILLING TO TAKE HER MOTHER ON AS A NEW PATIENT.

## 2024-07-18 ENCOUNTER — TELEPHONE (OUTPATIENT)
Dept: INTERNAL MEDICINE | Facility: CLINIC | Age: 64
End: 2024-07-18
Payer: MEDICAID

## 2024-07-22 NOTE — TELEPHONE ENCOUNTER
Attempted to contact patient. Left message to call the office back.    HUB OK TO TRANSFER TO OFFICE.     OFFICE -- PLEASE SCHEDULE PATIENT IN 30 MINUTE SPOT FOR NEW PATIENT APPOINTMENT

## 2024-08-16 ENCOUNTER — TELEPHONE (OUTPATIENT)
Dept: UROLOGY | Facility: CLINIC | Age: 64
End: 2024-08-16
Payer: MEDICAID

## 2024-08-16 NOTE — TELEPHONE ENCOUNTER
Called patient to let her know that her mom's insurance is out of network [facility].  Did not schedule as of yet until I speak with daughter about [facility] not taken her insurance that she would be responsible financially can give the number for estimate line to see how much this would cost.

## 2024-08-21 ENCOUNTER — OFFICE VISIT (OUTPATIENT)
Dept: INTERNAL MEDICINE | Facility: CLINIC | Age: 64
End: 2024-08-21
Payer: MEDICARE

## 2024-08-21 VITALS
TEMPERATURE: 98.2 F | WEIGHT: 130 LBS | OXYGEN SATURATION: 95 % | HEIGHT: 66 IN | SYSTOLIC BLOOD PRESSURE: 158 MMHG | BODY MASS INDEX: 20.89 KG/M2 | HEART RATE: 85 BPM | DIASTOLIC BLOOD PRESSURE: 82 MMHG

## 2024-08-21 DIAGNOSIS — J30.9 ALLERGIC RHINITIS, UNSPECIFIED SEASONALITY, UNSPECIFIED TRIGGER: Chronic | ICD-10-CM

## 2024-08-21 DIAGNOSIS — Z11.59 NEED FOR HEPATITIS C SCREENING TEST: ICD-10-CM

## 2024-08-21 DIAGNOSIS — M06.9 RHEUMATOID ARTHRITIS, INVOLVING UNSPECIFIED SITE, UNSPECIFIED WHETHER RHEUMATOID FACTOR PRESENT: Chronic | ICD-10-CM

## 2024-08-21 DIAGNOSIS — G47.00 INSOMNIA, UNSPECIFIED TYPE: ICD-10-CM

## 2024-08-21 DIAGNOSIS — I10 HYPERTENSION, UNSPECIFIED TYPE: ICD-10-CM

## 2024-08-21 DIAGNOSIS — Z00.00 ENCOUNTER FOR MEDICAL EXAMINATION TO ESTABLISH CARE: Primary | ICD-10-CM

## 2024-08-21 DIAGNOSIS — G89.29 OTHER CHRONIC PAIN: ICD-10-CM

## 2024-08-21 DIAGNOSIS — Z12.11 SCREENING FOR COLON CANCER: ICD-10-CM

## 2024-08-21 DIAGNOSIS — F33.0 MILD EPISODE OF RECURRENT MAJOR DEPRESSIVE DISORDER: ICD-10-CM

## 2024-08-21 DIAGNOSIS — Z13.220 SCREENING FOR LIPID DISORDERS: ICD-10-CM

## 2024-08-21 DIAGNOSIS — E03.9 ACQUIRED HYPOTHYROIDISM: ICD-10-CM

## 2024-08-21 DIAGNOSIS — L98.9 SKIN LESION: ICD-10-CM

## 2024-08-21 DIAGNOSIS — M81.0 OSTEOPOROSIS, UNSPECIFIED OSTEOPOROSIS TYPE, UNSPECIFIED PATHOLOGICAL FRACTURE PRESENCE: ICD-10-CM

## 2024-08-21 DIAGNOSIS — Z87.19 HISTORY OF DIVERTICULITIS: ICD-10-CM

## 2024-08-21 DIAGNOSIS — Z23 NEED FOR VACCINATION: ICD-10-CM

## 2024-08-21 LAB
ALBUMIN SERPL-MCNC: 4.3 G/DL (ref 3.5–5.2)
ALBUMIN/GLOB SERPL: 1.6 G/DL
ALP SERPL-CCNC: 104 U/L (ref 39–117)
ALT SERPL W P-5'-P-CCNC: 16 U/L (ref 1–33)
ANION GAP SERPL CALCULATED.3IONS-SCNC: 11 MMOL/L (ref 5–15)
AST SERPL-CCNC: 21 U/L (ref 1–32)
BASOPHILS # BLD AUTO: 0.04 10*3/MM3 (ref 0–0.2)
BASOPHILS NFR BLD AUTO: 0.5 % (ref 0–1.5)
BILIRUB SERPL-MCNC: 0.2 MG/DL (ref 0–1.2)
BUN SERPL-MCNC: 8 MG/DL (ref 8–23)
BUN/CREAT SERPL: 7.2 (ref 7–25)
CALCIUM SPEC-SCNC: 9.7 MG/DL (ref 8.6–10.5)
CHLORIDE SERPL-SCNC: 100 MMOL/L (ref 98–107)
CHOLEST SERPL-MCNC: 229 MG/DL (ref 0–200)
CO2 SERPL-SCNC: 27 MMOL/L (ref 22–29)
CREAT SERPL-MCNC: 1.11 MG/DL (ref 0.57–1)
DEPRECATED RDW RBC AUTO: 40.6 FL (ref 37–54)
EGFRCR SERPLBLD CKD-EPI 2021: 55.3 ML/MIN/1.73
EOSINOPHIL # BLD AUTO: 0.19 10*3/MM3 (ref 0–0.4)
EOSINOPHIL NFR BLD AUTO: 2.4 % (ref 0.3–6.2)
ERYTHROCYTE [DISTWIDTH] IN BLOOD BY AUTOMATED COUNT: 12.5 % (ref 12.3–15.4)
GLOBULIN UR ELPH-MCNC: 2.7 GM/DL
GLUCOSE SERPL-MCNC: 86 MG/DL (ref 65–99)
HCT VFR BLD AUTO: 40.2 % (ref 34–46.6)
HDLC SERPL-MCNC: 43 MG/DL (ref 40–60)
HGB BLD-MCNC: 13.4 G/DL (ref 12–15.9)
IMM GRANULOCYTES # BLD AUTO: 0.03 10*3/MM3 (ref 0–0.05)
IMM GRANULOCYTES NFR BLD AUTO: 0.4 % (ref 0–0.5)
LDLC SERPL CALC-MCNC: 142 MG/DL (ref 0–100)
LDLC/HDLC SERPL: 3.2 {RATIO}
LYMPHOCYTES # BLD AUTO: 2.76 10*3/MM3 (ref 0.7–3.1)
LYMPHOCYTES NFR BLD AUTO: 35.3 % (ref 19.6–45.3)
MCH RBC QN AUTO: 30.3 PG (ref 26.6–33)
MCHC RBC AUTO-ENTMCNC: 33.3 G/DL (ref 31.5–35.7)
MCV RBC AUTO: 91 FL (ref 79–97)
MONOCYTES # BLD AUTO: 0.49 10*3/MM3 (ref 0.1–0.9)
MONOCYTES NFR BLD AUTO: 6.3 % (ref 5–12)
NEUTROPHILS NFR BLD AUTO: 4.31 10*3/MM3 (ref 1.7–7)
NEUTROPHILS NFR BLD AUTO: 55.1 % (ref 42.7–76)
NRBC BLD AUTO-RTO: 0 /100 WBC (ref 0–0.2)
PLATELET # BLD AUTO: 235 10*3/MM3 (ref 140–450)
PMV BLD AUTO: 12.2 FL (ref 6–12)
POTASSIUM SERPL-SCNC: 4.4 MMOL/L (ref 3.5–5.2)
PROT SERPL-MCNC: 7 G/DL (ref 6–8.5)
RBC # BLD AUTO: 4.42 10*6/MM3 (ref 3.77–5.28)
SODIUM SERPL-SCNC: 138 MMOL/L (ref 136–145)
T4 FREE SERPL-MCNC: 0.69 NG/DL (ref 0.92–1.68)
TRIGL SERPL-MCNC: 243 MG/DL (ref 0–150)
TSH SERPL DL<=0.05 MIU/L-ACNC: 10.6 UIU/ML (ref 0.27–4.2)
VLDLC SERPL-MCNC: 44 MG/DL (ref 5–40)
WBC NRBC COR # BLD AUTO: 7.82 10*3/MM3 (ref 3.4–10.8)

## 2024-08-21 PROCEDURE — 3079F DIAST BP 80-89 MM HG: CPT | Performed by: NURSE PRACTITIONER

## 2024-08-21 PROCEDURE — 3077F SYST BP >= 140 MM HG: CPT | Performed by: NURSE PRACTITIONER

## 2024-08-21 PROCEDURE — 99215 OFFICE O/P EST HI 40 MIN: CPT | Performed by: NURSE PRACTITIONER

## 2024-08-21 PROCEDURE — 1125F AMNT PAIN NOTED PAIN PRSNT: CPT | Performed by: NURSE PRACTITIONER

## 2024-08-21 PROCEDURE — 85025 COMPLETE CBC W/AUTO DIFF WBC: CPT | Performed by: NURSE PRACTITIONER

## 2024-08-21 PROCEDURE — 1159F MED LIST DOCD IN RCRD: CPT | Performed by: NURSE PRACTITIONER

## 2024-08-21 PROCEDURE — G0009 ADMIN PNEUMOCOCCAL VACCINE: HCPCS | Performed by: NURSE PRACTITIONER

## 2024-08-21 PROCEDURE — 1160F RVW MEDS BY RX/DR IN RCRD: CPT | Performed by: NURSE PRACTITIONER

## 2024-08-21 PROCEDURE — 90677 PCV20 VACCINE IM: CPT | Performed by: NURSE PRACTITIONER

## 2024-08-21 PROCEDURE — 80061 LIPID PANEL: CPT | Performed by: NURSE PRACTITIONER

## 2024-08-21 PROCEDURE — 86803 HEPATITIS C AB TEST: CPT | Performed by: NURSE PRACTITIONER

## 2024-08-21 PROCEDURE — 84439 ASSAY OF FREE THYROXINE: CPT | Performed by: NURSE PRACTITIONER

## 2024-08-21 PROCEDURE — 84443 ASSAY THYROID STIM HORMONE: CPT | Performed by: NURSE PRACTITIONER

## 2024-08-21 PROCEDURE — 80053 COMPREHEN METABOLIC PANEL: CPT | Performed by: NURSE PRACTITIONER

## 2024-08-21 RX ORDER — FLUTICASONE PROPIONATE 50 MCG
2 SPRAY, SUSPENSION (ML) NASAL DAILY
Qty: 16 G | Refills: 3 | Status: SHIPPED | OUTPATIENT
Start: 2024-08-21 | End: 2024-08-22 | Stop reason: SDUPTHER

## 2024-08-21 RX ORDER — TRAZODONE HYDROCHLORIDE 50 MG/1
50 TABLET, FILM COATED ORAL NIGHTLY
Qty: 30 TABLET | Refills: 1 | Status: SHIPPED | OUTPATIENT
Start: 2024-08-21 | End: 2024-08-22 | Stop reason: SDUPTHER

## 2024-08-21 RX ORDER — CETIRIZINE HYDROCHLORIDE 10 MG/1
10 TABLET ORAL DAILY
Qty: 90 TABLET | Refills: 1 | Status: SHIPPED | OUTPATIENT
Start: 2024-08-21 | End: 2024-08-22 | Stop reason: SDUPTHER

## 2024-08-21 RX ORDER — LEVOTHYROXINE SODIUM 150 UG/1
150 TABLET ORAL DAILY
Qty: 90 TABLET | Refills: 1 | Status: SHIPPED | OUTPATIENT
Start: 2024-08-21 | End: 2024-08-22 | Stop reason: SDUPTHER

## 2024-08-21 NOTE — LETTER
[facility]  Vaccine Consent Form    Patient Name:  Kavita Garcia  Patient :  1960     Vaccine(s) Ordered    Pneumococcal Conjugate Vaccine 20-Valent (PCV20)        Screening Checklist  The following questions should be completed prior to vaccination. If you answer “yes” to any question, it does not necessarily mean you should not be vaccinated. It just means we may need to clarify or ask more questions. If a question is unclear, please ask your healthcare provider to explain it.    Yes No   Any fever or moderate to severe illness today (mild illness and/or antibiotic treatment are not contraindications)?     Do you have a history of a serious reaction to any previous vaccinations, such as anaphylaxis, encephalopathy within 7 days, Guillain-De Graff syndrome within 6 weeks, seizure?     Have you received any live vaccine(s) (e.g MMR, YOSEF) or any other vaccines in the last month (to ensure duplicate doses aren't given)?     Do you have an anaphylactic allergy to latex (DTaP, DTaP-IPV, Hep A, Hep B, MenB, RV, Td, Tdap), baker’s yeast (Hep B, HPV), polysorbates (RSV, nirsevimab, PCV 20, Rotavirrus, Tdap, Shingrix), or gelatin (YOSEF, MMR)?     Do you have an anaphylactic allergy to neomycin (Rabies, YOSEF, MMR, IPV, Hep A), polymyxin B (IPV), or streptomycin (IPV)?      Any cancer, leukemia, AIDS, or other immune system disorder? (YOSEF, MMR, RV)     Do you have a parent, brother, or sister with an immune system problem (if immune competence of vaccine recipient clinically verified, can proceed)? (MMR, YOSEF)     Any recent steroid treatments for >2 weeks, chemotherapy, or radiation treatment? (YOSEF, MMR)     Have you received antibody-containing blood transfusions or IVIG in the past 11 months (recommended interval is dependent on product)? (MMR, YOSEF)     Have you taken antiviral drugs (acyclovir, famciclovir, valacyclovir for YOSEF) in the last 24 or 48 hours, respectively?      Are you pregnant or planning to become  "pregnant within 1 month? (YOSEF, MMR, HPV, IPV, MenB, Abrexvy; For Hep B- refer to Engerix-B; For RSV - Abrysvo is indicated for 32-36 weeks of pregnancy from September to January)     For infants, have you ever been told your child has had intussusception or a medical emergency involving obstruction of the intestine (Rotavirus)? If not for an infant, can skip this question.         *Ordering Physicians/APC should be consulted if \"yes\" is checked by the patient or guardian above.  I have received, read, and understand the Vaccine Information Statement (VIS) for each vaccine ordered.  I have considered my or my child's health status as well as the health status of my close contacts.  I have taken the opportunity to discuss my vaccine questions with my or my child's health care provider.   I have requested that the ordered vaccine(s) be given to me or my child.  I understand the benefits and risks of the vaccines.  I understand that I should remain in the clinic for 15 minutes after receiving the vaccine(s).  _________________________________________________________  Signature of Patient or Parent/Legal Guardian ____________________  Date     "

## 2024-08-21 NOTE — PROGRESS NOTES
Chief Complaint  Establish Care, Hypothyroidism (Patient has been out of her thyroid medication. Patient believes she was on 150 mcg recently. ), Skin Problem (Patient has a mole she would like looked at. ), and Allergies (Patient would like to discuss. She reports she has bad allergies. )    Subjective          Kavita Garcia presents to [facility]  Hypertension  This is a chronic problem. Episode onset: has been off of medications x several years. Pertinent negatives include no anxiety, blurred vision, chest pain, headaches, malaise/fatigue, neck pain, orthopnea, palpitations, peripheral edema, PND, shortness of breath or sweats. Current antihypertension treatment includes nothing. There is no history of angina, kidney disease, CAD/MI, CVA, heart failure, left ventricular hypertrophy, PVD or retinopathy. There is no history of chronic renal disease, coarctation of the aorta, hyperaldosteronism, hypercortisolism, hyperparathyroidism, a hypertension causing med, pheochromocytoma, renovascular disease, sleep apnea or a thyroid problem.   Hypothyroidism  This is a chronic problem. Associated symptoms include swollen glands. Pertinent negatives include no abdominal pain, anorexia, arthralgias, change in bowel habit, chest pain, chills, congestion, coughing, diaphoresis, fatigue, fever, headaches, joint swelling, myalgias, nausea, neck pain, numbness, rash, sore throat, urinary symptoms, vertigo, visual change, vomiting or weakness.   Depression  Presents for initial visit. Symptoms include decreased concentration, depressed mood, excessive worry, feelings of hopelessness, insomnia, irritability, nervousness/anxiety, difficulty staying asleep and difficulty falling asleep. Patient is not experiencing: compulsions, confusion, dry mouth, feelings of worthlessness, hypersomnia, hyperventilation, impotence, muscle tension, obsessions, palpitations, panic, psychomotor  agitation, psychomotor retardation, shortness of breath, suicidal ideas, suicidal planning, thoughts of death, weight gain, weight loss, chest pain, feeling of choking, dizziness, malaise/fatigue, nausea and difficulty controlling mood.  Her past medical history is significant for depression.  Risk factors: lost her , moved. Risk factors: lost her , moved.   Insomnia  This is a new problem. Associated symptoms include swollen glands. Pertinent negatives include no abdominal pain, anorexia, arthralgias, change in bowel habit, chest pain, chills, congestion, coughing, diaphoresis, fatigue, fever, headaches, joint swelling, myalgias, nausea, neck pain, numbness, rash, sore throat, urinary symptoms, vertigo, visual change, vomiting or weakness.       Previous PCP: [facility] - Patient knows the physicians name was Ledy, but unsure of last name.  Specialist(s): Patient does not currently see any specialist. Patient has been wanting to see someone for her arthritis.   COVID vaccine: Product not available today. Patient has never had it before.  Pneumonia vaccine: Patient is agreeable to complete today.  Shingles vaccine: Patient can not get in our clinic due to insurance. Patient has never had it before.   Colon cancer screenin2014 -- patient is agreeable to complete.  Mammogram: Patient has not done a mammogram in over 10 years. Patient refused orders being placed due to the pain it causes her to get it done.  Pap Smear: never had abnormal   DEXA/Bone Density: Patient has never had one, but is agreeable to complete.     Allergic Rhinitis:     Not currently on any medications.     Chronic Pain; sees pain management.     Skin lesion:     left anterior chest. States that it has been present for several years but recently became painful and pain is radiating outward.     Current Outpatient Medications   Medication Instructions    atorvastatin (LIPITOR) 40 mg, Oral, Daily    cetirizine  "(ZYRTEC) 10 mg, Oral, Daily    fluticasone (FLONASE) 50 MCG/ACT nasal spray 2 sprays, Nasal, Daily    gabapentin (NEURONTIN) 300 MG capsule Take 1 capsule by mouth 2 (Two) Times a Day.    levothyroxine (SYNTHROID) 175 mcg, Oral, Daily    lisinopril (PRINIVIL,ZESTRIL) 10 MG tablet     naloxone (NARCAN) 4 MG/0.1ML nasal spray Call 911. Don't prime. [place] in 1 nostril for overdose. Repeat in 2-3 minutes in other nostril if no or minimal breathing/responsiveness.    ondansetron ODT (ZOFRAN-ODT) 4 mg, Translingual, Every 8 Hours PRN    oxyCODONE (ROXICODONE) 10 mg, Oral, Every 4 Hours PRN    traZODone (DESYREL) 50 mg, Oral, Nightly       The following portions of the patient's history were reviewed and updated as appropriate: allergies, current medications, past family history, past medical history, past social history, past surgical history, and problem list.    Objective   Vital Signs:   /82 (BP Location: Left arm, Patient Position: Sitting, Cuff Size: Adult)   Pulse 85   Temp 98.2 °F (36.8 °C) (Temporal)   Ht 166.4 cm (65.5\")   Wt 59 kg (130 lb)   SpO2 95%   BMI 21.30 kg/m²     BP Readings from Last 3 Encounters:   08/21/24 158/82   07/01/24 135/62   06/30/24 125/72     Wt Readings from Last 3 Encounters:   08/21/24 59 kg (130 lb)   07/10/24 58.8 kg (129 lb 9.6 oz)   07/01/24 58.1 kg (128 lb 1.4 oz)     PHQ-9 Depression Screening  Little interest or pleasure in doing things? 1-->several days   Feeling down, depressed, or hopeless? 3-->nearly every day   Trouble falling or staying asleep, or sleeping too much? 3-->nearly every day (Trouble sleeping)   Feeling tired or having little energy? 2-->more than half the days   Poor appetite or overeating? 3-->nearly every day (Overeating)   Feeling bad about yourself - or that you are a failure or have let yourself or your family down? 3-->nearly every day (Feeling bad about herself)   Trouble concentrating on things, such as reading the newspaper or watching " television? 3-->nearly every day   Moving or speaking so slowly that other people could have noticed? Or the opposite - being so fidgety or restless that you have been moving around a lot more than usual? 0-->not at all   Thoughts that you would be better off dead, or of hurting yourself in some way? 0-->not at all   PHQ-9 Total Score 18   If you checked off any problems, how difficult have these problems made it for you to do your work, take care of things at home, or get along with other people? somewhat difficult (Gets along with others but a lot of time she shuts down and wants to be alone.)       BMI is within normal parameters. No other follow-up for BMI required.     Physical Exam     Appearance: No acute distress, well-nourished  Head: normocephalic, atraumatic  Eyes: extraocular movements intact, no scleral icterus, no conjunctival injection  Ears, Nose, and Throat: external ears normal, nares patent, moist mucous membranes  Cardiovascular: regular rate and rhythm. no murmurs, rubs, or gallops. no edema  Respiratory: breathing comfortably, symmetric chest rise, clear to auscultation bilaterally. No wheezes, rales, or rhonchi.  Neuro: alert and oriented to time, place, and person. Normal gait  Psych: normal mood and affect     Result Review :   The following data was reviewed by: LAMINE Weiss on 08/21/2024:  Common labs          2/16/2024    15:48 6/30/2024    13:57 8/21/2024    16:28   Common Labs   Glucose 96  124  86    BUN 9  10  8    Creatinine 0.97  0.75  1.11    Sodium 136  137  138    Potassium 3.7  3.2  4.4    Chloride 100  100  100    Calcium 9.2  9.3  9.7    Albumin  4.1  4.3    Total Bilirubin  0.3  0.2    Alkaline Phosphatase  109  104    AST (SGOT)  18  21    ALT (SGPT)  20  16    WBC 9.03  5.79  7.82    Hemoglobin 11.5  13.1  13.4    Hematocrit 35.9  37.3  40.2    Platelets 247  274  235    Total Cholesterol   229    Triglycerides   243    HDL Cholesterol   43    LDL Cholesterol     142             Urinalysis, Microscopic Only - Urine, Clean Catch (06/30/2024 14:13)  Urinalysis With Microscopic If Indicated (No Culture) - Urine, Clean Catch (06/30/2024 14:13)  Lactic Acid, Plasma (06/30/2024 13:57)  Lipase (06/30/2024 13:57)  Comprehensive Metabolic Panel (06/30/2024 13:57)  CBC & Differential (06/30/2024 13:57)      Lab Results   Component Value Date    INR 1.0 08/14/2020    BILIRUBINUR Negative 06/30/2024            Assessment and Plan    Diagnoses and all orders for this visit:    1. Encounter for medical examination to establish care (Primary)    2. Need for hepatitis C screening test  -     HCV Antibody Rfx To Qnt PCR  -     Hepatitis C Virus Interpretation    3. Screening for lipid disorders  -     Lipid Panel    4. Acquired hypothyroidism  Comments:  will recheck TSH today; patient has been off medications x2 weeks  Orders:  -     TSH Rfx On Abnormal To Free T4  -     Discontinue: levothyroxine (SYNTHROID, LEVOTHROID) 150 MCG tablet; Take 1 tablet by mouth Daily.  Dispense: 90 tablet; Refill: 1  -     T4, Free    5. Osteoporosis, unspecified osteoporosis type, unspecified pathological fracture presence  Comments:  DEXA ordered  Orders:  -     DEXA Bone Density Axial    6. Need for vaccination  -     Pneumococcal Conjugate Vaccine 20-Valent (PCV20)    7. Screening for colon cancer  -     Ambulatory Referral For Screening Colonoscopy    8. Hypertension, unspecified type  Comments:  reports white coat syndrome; will recheck at f/u appt; ambulatory BP monitoring  Orders:  -     CBC & Differential  -     Comprehensive Metabolic Panel  -     Lipid Panel    9. Allergic rhinitis, unspecified seasonality, unspecified trigger  Comments:  adding flonase and cetirizine today  Orders:  -     Discontinue: fluticasone (FLONASE) 50 MCG/ACT nasal spray; 2 sprays into the nostril(s) as directed by provider Daily.  Dispense: 16 g; Refill: 3  -     Discontinue: cetirizine (zyrTEC) 10 MG tablet; Take 1  tablet by mouth Daily.  Dispense: 90 tablet; Refill: 1    10. History of diverticulitis    11. Rheumatoid arthritis, involving unspecified site, unspecified whether rheumatoid factor present  Comments:  rheumatology referral placed today  Orders:  -     Ambulatory Referral to Rheumatology    12. Other chronic pain    13. Skin lesion  -     Ambulatory Referral to Dermatology    14. Mild episode of recurrent major depressive disorder  -     [facility] - Swab,; Future    15. Insomnia, unspecified type  Comments:  new dx today; start trazodone; sleep hygeine  Orders:  -     Discontinue: traZODone (DESYREL) 50 MG tablet; Take 1 tablet by mouth Every Night.  Dispense: 30 tablet; Refill: 1      - will start antidepressant based on genesight results.        Reviewed:     Admission (Discharged) with Ruma La MD (07/01/2024)   Procedure visit with Ruma La MD (07/10/2024)   ED with Maximo Jenkins DO (06/30/2024)   ED with Vivienne Cagle MD (02/16/2024)   ED to Hosp-Admission (Discharged) with Jasiel Diop MD (02/05/2024)     Medications Discontinued During This Encounter   Medication Reason    oxybutynin (DITROPAN) 5 MG tablet *Therapy completed    phenazopyridine (Pyridium) 200 MG tablet *Therapy completed    tamsulosin (FLOMAX) 0.4 MG capsule 24 hr capsule *Therapy completed    famotidine (PEPCID) 20 MG tablet Historical Med - Therapy completed    cyclobenzaprine (FLEXERIL) 5 MG tablet Historical Med - Therapy completed    levothyroxine (SYNTHROID, LEVOTHROID) 150 MCG tablet Reorder        I spent 40 minutes caring for Kavita on this date of service. This time includes time spent by me in the following activities:preparing for the visit, reviewing tests, obtaining and/or reviewing a separately obtained history, performing a medically appropriate examination and/or evaluation , counseling and educating the patient/family/caregiver, ordering medications, tests, or procedures, referring and  communicating with other health care professionals , documenting information in the medical record, independently interpreting results and communicating that information with the patient/family/caregiver, and care coordination  Follow Up   Return in about 6 weeks (around 10/2/2024) for Hypertension, Thyroid, Medicare Wellness.  Patient was given instructions and counseling regarding her condition or for health maintenance advice. Please see specific information pulled into the AVS if appropriate.       LAMINE Weiss  08/29/24  10:25 EDT

## 2024-08-22 ENCOUNTER — TELEPHONE (OUTPATIENT)
Dept: INTERNAL MEDICINE | Facility: CLINIC | Age: 64
End: 2024-08-22
Payer: MEDICAID

## 2024-08-22 DIAGNOSIS — J30.9 ALLERGIC RHINITIS, UNSPECIFIED SEASONALITY, UNSPECIFIED TRIGGER: Chronic | ICD-10-CM

## 2024-08-22 DIAGNOSIS — G47.00 INSOMNIA, UNSPECIFIED TYPE: ICD-10-CM

## 2024-08-22 DIAGNOSIS — E03.9 ACQUIRED HYPOTHYROIDISM: ICD-10-CM

## 2024-08-22 RX ORDER — LEVOTHYROXINE SODIUM 0.15 MG/1
150 TABLET ORAL DAILY
Qty: 90 TABLET | Refills: 1 | Status: SHIPPED | OUTPATIENT
Start: 2024-08-22

## 2024-08-22 RX ORDER — CETIRIZINE HYDROCHLORIDE 10 MG/1
10 TABLET ORAL DAILY
Qty: 90 TABLET | Refills: 1 | Status: SHIPPED | OUTPATIENT
Start: 2024-08-22

## 2024-08-22 RX ORDER — TRAZODONE HYDROCHLORIDE 50 MG/1
50 TABLET ORAL NIGHTLY
Qty: 30 TABLET | Refills: 1 | Status: SHIPPED | OUTPATIENT
Start: 2024-08-22

## 2024-08-22 RX ORDER — FLUTICASONE PROPIONATE 50 MCG
2 SPRAY, SUSPENSION (ML) NASAL DAILY
Qty: 16 G | Refills: 3 | Status: SHIPPED | OUTPATIENT
Start: 2024-08-22

## 2024-08-22 NOTE — TELEPHONE ENCOUNTER
Caller: Kavita Garcia    Relationship: Self    Best call back number: [phone]    Requested Prescriptions:   ALL MEDICATIONS PRESCRIBED YESTERDAY, 08/21/2024    Pharmacy where request should be sent: [facility]/PHARMACY #[id number] - [place], [place] - [address] - [phone]  - [phone] FX     Last office visit with prescribing clinician: 8/21/2024   Last telemedicine visit with prescribing clinician: Visit date not found   Next office visit with prescribing clinician: 10/2/2024     Additional details provided by patient: [facility] TO [facility]STEAD     Does the patient have less than a 3 day supply:  [x] Yes  [] No    Would you like a call back once the refill request has been completed: [] Yes [x] No    If the office needs to give you a call back, can they leave a voicemail: [] Yes [x] No    Elva Burkett   08/22/24 09:06 EDT

## 2024-08-23 LAB
HCV AB SERPL QL IA: NORMAL
HCV IGG SERPL QL IA: NON REACTIVE

## 2024-08-27 ENCOUNTER — TELEPHONE (OUTPATIENT)
Dept: INTERNAL MEDICINE | Facility: CLINIC | Age: 64
End: 2024-08-27
Payer: MEDICARE

## 2024-08-27 RX ORDER — ATORVASTATIN CALCIUM 40 MG/1
40 TABLET, FILM COATED ORAL DAILY
Qty: 90 TABLET | Refills: 1 | Status: SHIPPED | OUTPATIENT
Start: 2024-08-27

## 2024-08-27 RX ORDER — LEVOTHYROXINE SODIUM 175 UG/1
175 TABLET ORAL DAILY
Qty: 45 TABLET | Refills: 1 | Status: SHIPPED | OUTPATIENT
Start: 2024-08-27

## 2024-08-27 NOTE — TELEPHONE ENCOUNTER
Spoke with daughter, OK per verbal release, informed of results. OK with Lipitor being sent to pharmacy.

## 2024-08-27 NOTE — TELEPHONE ENCOUNTER
----- Message from Georgette Schwartz sent at 8/27/2024 11:21 AM EDT -----  Tsh elevated. Need to increase synthroid. Sending to the pharmacy     GFR decreased. Increase water intake and we will recheck     The 10-year ASCVD risk score (Delfina GARCIA, et al., 2019) is: 13.2%  It is recommended that patient be on cholesterol lowering medication is she agreeable? If so 40 mg Lipitor.

## 2024-08-30 ENCOUNTER — TELEPHONE (OUTPATIENT)
Dept: INTERNAL MEDICINE | Facility: CLINIC | Age: 64
End: 2024-08-30
Payer: MEDICARE

## 2024-08-30 DIAGNOSIS — F33.0 MILD EPISODE OF RECURRENT MAJOR DEPRESSIVE DISORDER: Primary | ICD-10-CM

## 2024-08-30 RX ORDER — DESVENLAFAXINE 50 MG/1
50 TABLET, FILM COATED, EXTENDED RELEASE ORAL DAILY
Qty: 30 TABLET | Refills: 1 | Status: SHIPPED | OUTPATIENT
Start: 2024-08-30

## 2024-08-30 NOTE — TELEPHONE ENCOUNTER
Spoke with patient's daughter. Verified .   Made aware of results of [facility].  Verbalized understanding.   Patient is agreeable to start Pristiq.

## 2024-09-06 ENCOUNTER — TELEPHONE (OUTPATIENT)
Dept: INTERNAL MEDICINE | Facility: CLINIC | Age: 64
End: 2024-09-06
Payer: MEDICARE

## 2024-09-06 DIAGNOSIS — F32.A ANXIETY AND DEPRESSION: ICD-10-CM

## 2024-09-06 DIAGNOSIS — M06.9 RHEUMATOID ARTHRITIS, INVOLVING UNSPECIFIED SITE, UNSPECIFIED WHETHER RHEUMATOID FACTOR PRESENT: ICD-10-CM

## 2024-09-06 DIAGNOSIS — E03.9 ACQUIRED HYPOTHYROIDISM: ICD-10-CM

## 2024-09-06 DIAGNOSIS — G47.00 INSOMNIA, UNSPECIFIED TYPE: ICD-10-CM

## 2024-09-06 DIAGNOSIS — I10 HYPERTENSION, UNSPECIFIED TYPE: Primary | ICD-10-CM

## 2024-09-06 DIAGNOSIS — F41.9 ANXIETY AND DEPRESSION: ICD-10-CM

## 2024-09-06 DIAGNOSIS — F33.0 MILD EPISODE OF RECURRENT MAJOR DEPRESSIVE DISORDER: ICD-10-CM

## 2024-09-06 NOTE — TELEPHONE ENCOUNTER
Caller: hanny salazar    Relationship to patient: Emergency Contact    Best call back number: [phone]    Patient is needing: PATIENT'S DAUGHTER CALLED IN (WITH PATIENT ON THE LINE) AND WOULD LIKE TO SEE IF PATIENT COULD RECEIVE SOME ASSISTANCE POSSIBLY FROM HOME HEALTH. PATIENT'S DAUGHTER SAID SHE MIGHT NEED ASSISTANCE WITH GETTING TO APPOINTMENTS AND HAVING HELP WITH TASKS AT HOME. PATIENT'S DAUGHTER IS REQUESTING A CALL BACK PLEASE

## 2024-09-09 ENCOUNTER — REFERRAL TRIAGE (OUTPATIENT)
Dept: CASE MANAGEMENT | Facility: OTHER | Age: 64
End: 2024-09-09
Payer: MEDICARE

## 2024-09-10 ENCOUNTER — TELEPHONE (OUTPATIENT)
Dept: CASE MANAGEMENT | Facility: OTHER | Age: 64
End: 2024-09-10
Payer: MEDICARE

## 2024-09-10 ENCOUNTER — PATIENT OUTREACH (OUTPATIENT)
Dept: CASE MANAGEMENT | Facility: OTHER | Age: 64
End: 2024-09-10
Payer: MEDICARE

## 2024-09-10 DIAGNOSIS — H93.13 TINNITUS OF BOTH EARS: Primary | ICD-10-CM

## 2024-09-10 DIAGNOSIS — E78.2 MIXED HYPERLIPIDEMIA: ICD-10-CM

## 2024-09-10 DIAGNOSIS — I15.9 SECONDARY HYPERTENSION: ICD-10-CM

## 2024-09-10 DIAGNOSIS — G89.29 OTHER CHRONIC PAIN: ICD-10-CM

## 2024-09-10 DIAGNOSIS — H91.90 HEARING LOSS, UNSPECIFIED HEARING LOSS TYPE, UNSPECIFIED LATERALITY: ICD-10-CM

## 2024-09-10 DIAGNOSIS — E03.9 ACQUIRED HYPOTHYROIDISM: Primary | ICD-10-CM

## 2024-09-10 NOTE — OUTREACH NOTE
AMBULATORY CASE MANAGEMENT NOTE    Names and Relationships of Patient/Support Persons: Contact: francojosehanny; Relationship: Emergency Contact -     [facility] Interim Update    I spoke with patient's daughter at length today. Patient recently moved here from [place] to live with her daughter. Patient recently had an appointment with this PCP and referrals were made for Rheumatology, Dermatology, DEXA scan, Renal US, Colonoscopy. Appointments have been made for DEXA and US. I gave Toña, the daughter, the name and numbers for [facility], Rheum and Derm to follow up on those referrals.    Patient currently has a pain pump in her back that was placed by a provider in [place]. This pain pump no longer works and does not contain pain medication at this time. It also reportedly alarms every couple of hours. They would like to find a provider who will remove this from her body.     Patient is established with palliative with [facility] for her pain management needs. I have encouraged Toña to contact them to see if they can give any direction as to who could remove the pain pump. I have also reached out to PCP regarding this.    Toña and patient are wanting to find resources who could drive patient to appointments,etc, could perform light housekeeping and provide some companionship/caregiving. I have placed a  referral regarding this.    Please see full assessment below:  Adult Patient Profile  Questions/Answers      Flowsheet Row Most Recent Value   Symptoms/Conditions Managed at Home chronic pain, endocrine, gastrointestinal, genitourinary, HEENT (head, eyes, ears, nose, throat), musculoskeletal, peripheral/neurovascular, skin, immunological, behavioral health, cardiovascular   Barriers to Managing Health understanding health advice, stress of chronic illness   Cardiovascular Symptoms/Conditions high blood cholesterol, hypertension   Cardiovascular Management Strategies medication therapy, routine screening   Cardiovascular  Self-Management Outcome 3 (uncertain)   Cardiovascular Comment recent bps charted 158/82, 135/62, 125/72   Endocrine Symptoms/Conditions thyroid disorder   Endocrine Management Strategies routine screening, medication therapy   Endocrine Self-Management Outcome 2 (bad)   Endocrine Comment TSH was elevated with last blood draw. Synthroid dose had to be increased   Gastrointestinal Symptoms/Conditions reflux/heartburn   Gastrointestinal Management Strategies medication therapy, diet modification   Gastrointestinal Self-Management Outcome 4 (good)   Genitourinary Symptoms/Conditions other (see comments)  [kidney stones]   Genitourinary Management Strategies fluid modification   Genitourinary Self-Management Outcome 3 (uncertain)   HEENT Symptoms/Conditions ear problem(s)  [ringing in both ears]   HEENT Self-Management Outcome 2 (bad)   HEENT Comment new symptom-informed PCP   Immunological Symptoms/Conditions rheumatoid arthritis   Immunological Management Strategies routine screening   Immunological Self-Management Outcome 2 (bad)   Immunological Comment referred to Rheumatolgy by PCP on 8/21/2024   Musculoskeletal Symptoms/Conditions back pain, joint pain, osteoarthritis, osteoporosis, scoliosis   Musculoskeletal Management Strategies medication therapy, medical device, routine screening   Musculoskeletal Self-Management Outcome 3 (uncertain)   Musculoskeletal Comment Palliative care through [facility] manages patient's pain   Peripheral Neurovascular Symptoms/Conditions neuropathy   Peripheral Neurovascular Management Strategies medication therapy, routine screening   Peripheral Neurovascular Self-Management Outcome 3 (uncertain)   Peripheral Neurovascular Comment Palliative care through [facility] manages patient's pain   Skin Symptoms/Conditions other (see comments)  [mole]   Skin Self-Management Outcome 3 (uncertain)   Skin Comment referred to Dermatology by PCP on 8/21/2024   Behavioral Health Symptoms/Conditions  depression   Behavioral Management Strategies medication therapy   Behavioral Health Self-Management Outcome 3 (uncertain)   Identifying Health Goals keep illness under control, managing stress, anxiety or depression   Importance of Change 10 - extremely important   Confidence to Make Change 10 - extremely confident   Readiness to Change 10 - extremely ready   Taking Medications Not Prescribed no   Missed Doses of Prescribed Medications During Past Week no   Taken Prescribed Medications at Different Time or Schedule During Past Week no   Taken More or Less Medication Than Prescribed no   Barriers to Taking Medication as Prescribed none   Current Living Arrangements home        SDOH updated and reviewed with the patient during this program:  Financial Resource Strain: Low Risk  (9/10/2024)    Overall Financial Resource Strain (CARDIA)     Difficulty of Paying Living Expenses: Not hard at all      --     Food Insecurity: No Food Insecurity (9/10/2024)    Hunger Vital Sign     Worried About Running Out of Food in the Last Year: Never true     Ran Out of Food in the Last Year: Never true      --     Housing Stability: Unknown (9/10/2024)    Housing Stability Vital Sign     Unable to Pay for Housing in the Last Year: No     Homeless in the Last Year: No      --     Transportation Needs: No Transportation Needs (9/10/2024)    PRAPARE - Transportation     Lack of Transportation (Medical): No     Lack of Transportation (Non-Medical): No     Care Coordination    Electronic communication with PCP.    Referral to SW.      Education Documentation  Unresolved/Worsening Symptoms, taught by Saima Gaona, RN at 9/10/2024  4:29 PM.  Learner: Family  Readiness: Acceptance  Method: Explanation, Teach Back  Response: Verbalizes Understanding, Needs Reinforcement    Self-Care, taught by Saima Gaona RN at 9/10/2024  4:29 PM.  Learner: Family  Readiness: Acceptance  Method: Explanation, Teach Back  Response: Verbalizes  Understanding, Needs Reinforcement    Provider Follow-Up, taught by Saima Gaona, RN at 9/10/2024  4:29 PM.  Learner: Family  Readiness: Acceptance  Method: Explanation, Teach Back  Response: Verbalizes Understanding, Needs Reinforcement    Medication Management, taught by Saima Gaona, RN at 9/10/2024  4:29 PM.  Learner: Family  Readiness: Acceptance  Method: Explanation, Teach Back  Response: Verbalizes Understanding, Needs Reinforcement    Blood Pressure Monitoring, taught by Saima Gaona, RN at 9/10/2024  4:29 PM.  Learner: Family  Readiness: Acceptance  Method: Explanation, Teach Back  Response: Verbalizes Understanding, Needs Reinforcement    medication management, taught by Saima Gaona, RN at 9/10/2024  4:29 PM.  Learner: Family  Readiness: Acceptance  Method: Explanation, Teach Back  Response: Verbalizes Understanding, Needs Reinforcement    pain management, taught by Saima Gaona, RN at 9/10/2024  4:29 PM.  Learner: Family  Readiness: Acceptance  Method: Explanation, Teach Back  Response: Verbalizes Understanding, Needs Reinforcement          Saima VOGEL  Ambulatory Case Management    9/10/2024, 16:36 EDT

## 2024-09-12 ENCOUNTER — PATIENT OUTREACH (OUTPATIENT)
Age: 64
End: 2024-09-12
Payer: MEDICARE

## 2024-09-16 ENCOUNTER — PATIENT OUTREACH (OUTPATIENT)
Dept: CASE MANAGEMENT | Facility: OTHER | Age: 64
End: 2024-09-16
Payer: MEDICARE

## 2024-09-16 DIAGNOSIS — I15.9 SECONDARY HYPERTENSION: Primary | ICD-10-CM

## 2024-09-16 DIAGNOSIS — G89.29 OTHER CHRONIC PAIN: ICD-10-CM

## 2024-09-19 ENCOUNTER — PATIENT OUTREACH (OUTPATIENT)
Age: 64
End: 2024-09-19
Payer: MEDICARE

## 2024-09-19 DIAGNOSIS — H91.90 HEARING LOSS, UNSPECIFIED HEARING LOSS TYPE, UNSPECIFIED LATERALITY: Primary | ICD-10-CM

## 2024-09-25 ENCOUNTER — TELEPHONE (OUTPATIENT)
Dept: GASTROENTEROLOGY | Facility: CLINIC | Age: 64
End: 2024-09-25
Payer: MEDICARE

## 2024-09-27 ENCOUNTER — PATIENT OUTREACH (OUTPATIENT)
Dept: CASE MANAGEMENT | Facility: OTHER | Age: 64
End: 2024-09-27
Payer: MEDICARE

## 2024-09-27 DIAGNOSIS — M06.9 RHEUMATOID ARTHRITIS, INVOLVING UNSPECIFIED SITE, UNSPECIFIED WHETHER RHEUMATOID FACTOR PRESENT: ICD-10-CM

## 2024-09-27 DIAGNOSIS — I15.9 SECONDARY HYPERTENSION: Primary | ICD-10-CM

## 2024-09-30 NOTE — PROGRESS NOTES
Subjective   The ABCs of the Annual Wellness Visit  Medicare Wellness Visit      Kavita Garcia is a 63 y.o. patient who presents for a Medicare Wellness Visit.    The following portions of the patient's history were reviewed and   updated as appropriate: allergies, current medications, past family history, past medical history, past social history, past surgical history, and problem list.    Compared to one year ago, the patient's physical   health is worse.  Compared to one year ago, the patient's mental   health is worse.    Recent Hospitalizations:  This patient has had a [facility] admission record on file within the last 365 days.  Current Medical Providers:  Patient Care Team:  Georgette Schwartz APRN as PCP - General (Internal Medicine & Pediatrics)  Ruma La MD as Consulting Physician (Urology)  Saima Gaona RN as Ambulatory  ([facility])  Celeste Peck MSW as  (Amb Case Mgmt) ([facility])    Outpatient Medications Prior to Visit   Medication Sig Dispense Refill   • atorvastatin (Lipitor) 40 MG tablet Take 1 tablet by mouth Daily. 90 tablet 1   • cetirizine (zyrTEC) 10 MG tablet Take 1 tablet by mouth Daily. 90 tablet 1   • fluticasone (FLONASE) 50 MCG/ACT nasal spray 2 sprays into the nostril(s) as directed by provider Daily. 16 g 3   • gabapentin (NEURONTIN) 300 MG capsule Take 1 capsule by mouth 2 (Two) Times a Day.     • levothyroxine (Synthroid) 175 MCG tablet Take 1 tablet by mouth Daily. 45 tablet 1   • naloxone (NARCAN) 4 MG/0.1ML nasal spray Call 911. Don't prime. [place] in 1 nostril for overdose. Repeat in 2-3 minutes in other nostril if no or minimal breathing/responsiveness. 2 each 0   • oxyCODONE-acetaminophen (PERCOCET)  MG per tablet Take 1 tablet by mouth Every 4 (Four) Hours As Needed. for pain     • traZODone (DESYREL) 50 MG tablet Take 1 tablet by mouth Every Night. 30 tablet 1   • desvenlafaxine (Pristiq) 50 MG 24 hr  tablet Take 1 tablet by mouth Daily. 30 tablet 1   • oxyCODONE (ROXICODONE) 10 MG tablet Take 1 tablet by mouth Every 4 (Four) Hours As Needed for Moderate Pain or Severe Pain.     • lisinopril (PRINIVIL,ZESTRIL) 10 MG tablet  (Patient not taking: Reported on 8/21/2024)     • ondansetron ODT (ZOFRAN-ODT) 4 MG disintegrating tablet Place 1 tablet on the tongue Every 8 (Eight) Hours As Needed for Nausea or Vomiting. (Patient not taking: Reported on 10/2/2024) 15 tablet 0     No facility-administered medications prior to visit.     Opioid medication/s are on active medication list.  and I have evaluated her active treatment plan and pain score trends (see table).  Vitals:    10/02/24 1045   PainSc:   5     I have reviewed the chart for potential of high risk medication and harmful drug interactions in the elderly.        Aspirin is not on active medication list.  Aspirin use is not indicated based on review of current medical condition/s. Risk of harm outweighs potential benefits.  .    Patient Active Problem List   Diagnosis   • Rheumatoid arthritis   • Chronic pain   • Degeneration of intervertebral disc of lumbar region   • Degeneration of intervertebral disc of thoracic region   • Depression   • Fibromyositis   • Hyperlipidemia   • Hypertension   • Hypothyroidism   • Other long term (current) drug therapy   • Postlaminectomy syndrome, not elsewhere classified   • Abdominal pain   • Breakdown (mechanical) of int fix of vertebrae, init   • Cervical spinal stenosis   • Lumbar stenosis   • Closed wedge compression fracture of T10 vertebra   • Displacement of internal fixation device of vertebrae   • Ileus, postoperative   • Kyphosis of thoracolumbar region   • Lumbar radiculopathy   • Lumbar scoliosis   • Neuropathy   • Osteoporosis   • Ankylosis of spine   • Pseudarthrosis following spinal fusion   • Pseudoarthrosis of spine   • S/P lumbar fusion   • Acquired postural kyphosis   • Secondary kyphosis deformity of spine  "  • Status post left hip replacement   • Tobacco use   • DDD (degenerative disc disease)   • Anxiety and depression   • Nausea and vomiting, unspecified vomiting type   • Hypokalemia   • Right ureteral stone   • History of diverticulitis   • Allergic rhinitis     Advance Care Planning Advance Directive is not on file.  ACP discussion was held with the patient during this visit. Patient has an advance directive (not in EMR), copy requested.            Objective   Vitals:    10/02/24 1045   BP: 160/98   BP Location: Right arm   Patient Position: Sitting   Cuff Size: Adult   Pulse: (!) 127   Temp: 98.2 °F (36.8 °C)   TempSrc: Temporal   SpO2: 98%   Weight: 55.8 kg (123 lb)   Height: 166.4 cm (65.5\")   PainSc:   5       Estimated body mass index is 20.16 kg/m² as calculated from the following:    Height as of this encounter: 166.4 cm (65.5\").    Weight as of this encounter: 55.8 kg (123 lb).    BMI is within normal parameters. No other follow-up for BMI required.    Vision Screening (10/02/2024)  Edited by: Stephanie Wood   Right eye Left eye Both eyes   Without correction 20/70 20/200 20/70        Does the patient have evidence of cognitive impairment? No  Lab Results   Component Value Date    TRIG 243 (H) 2024    HDL 43 2024     (H) 2024    VLDL 44 (H) 2024                                                                                                Health  Risk Assessment    Smoking Status:  Social History     Tobacco Use   Smoking Status Former   • Current packs/day: 0.00   • Average packs/day: 0.5 packs/day for 25.0 years (12.5 ttl pk-yrs)   • Types: Cigarettes   • Start date:    • Quit date:    • Years since quittin.7   Smokeless Tobacco Never     Alcohol Consumption:  Social History     Substance and Sexual Activity   Alcohol Use Never       Fall Risk Screen  STEADI Fall Risk Assessment was completed, and patient is at MODERATE risk for falls. Assessment completed " on:10/2/2024    Depression Screening:      10/2/2024    10:58 AM   PHQ-2/PHQ-9 Depression Screening   Little Interest or Pleasure in Doing Things 1-->several days   Feeling Down, Depressed or Hopeless 3-->nearly every day   Trouble Falling or Staying Asleep, or Sleeping Too Much 0-->not at all   Feeling Tired or Having Little Energy 3-->nearly every day   Poor Appetite or Overeating 3-->nearly every day   Feeling Bad about Yourself - or that You are a Failure or Have Let Yourself or Your Family Down 0-->not at all   Trouble Concentrating on Things, Such as Reading the Newspaper or Watching Television 3-->nearly every day   Moving or Speaking So Slowly that Other People Could Have Noticed? Or the Opposite - Being So Fidgety 1-->several days   Thoughts that You Would be Better Off Dead or of Hurting Yourself in Some Way 0-->not at all   PHQ-9: Brief Depression Severity Measure Score 14   If You Checked Off Any Problems, How Difficult Have These Problems Made It For You to Do Your Work, Take Care of Things at Home, or Get Along with Other People? somewhat difficult     Health Habits and Functional and Cognitive Screening:      10/2/2024    10:56 AM   Functional & Cognitive Status   Do you have difficulty preparing food and eating? Yes   Do you have difficulty bathing yourself, getting dressed or grooming yourself? No   Do you have difficulty using the toilet? No   Do you have difficulty moving around from place to place? Yes   Do you have trouble with steps or getting out of a bed or a chair? No   Current Diet Well Balanced Diet   Dental Exam Up to date   Eye Exam Not up to date   Exercise (times per week) 0 times per week   Current Exercises Include No Regular Exercise   Do you need help using the phone?  No   Are you deaf or do you have serious difficulty hearing?  No   Do you need help to go to places out of walking distance? No   Do you need help shopping? Yes   Do you need help preparing meals?  No   Do you need  help with housework?  No   Do you need help with laundry? No   Do you need help taking your medications? No   Do you need help managing money? No   Do you ever drive or ride in a car without wearing a seat belt? No   Have you felt unusual stress, anger or loneliness in the last month? Yes   Who do you live with? Child   If you need help, do you have trouble finding someone available to you? No   Have you been bothered in the last four weeks by sexual problems? No   Do you have difficulty concentrating, remembering or making decisions? Yes           Visual Acuity:  Vision Screening    Right eye Left eye Both eyes   Without correction 20/70 20/200 20/70   With correction          Age-appropriate Screening Schedule:  Refer to the list below for future screening recommendations based on patient's age, sex and/or medical conditions. Orders for these recommended tests are listed in the plan section. The patient has been provided with a written plan.    Health Maintenance List  Health Maintenance   Topic Date Due   • DXA SCAN  Never done   • PAP SMEAR  Never done   • COLORECTAL CANCER SCREENING  05/13/2024   • COVID-19 Vaccine (1 - 2023-24 season) 10/04/2024 (Originally 9/1/2024)   • TDAP/TD VACCINES (1 - Tdap) 12/30/2024 (Originally 12/15/1979)   • ZOSTER VACCINE (1 of 2) 12/30/2024 (Originally 12/15/2008)   • MAMMOGRAM  08/21/2025 (Originally 1960)   • LIPID PANEL  08/21/2025   • ANNUAL WELLNESS VISIT  10/02/2025   • HEPATITIS C SCREENING  Completed   • INFLUENZA VACCINE  Completed   • Pneumococcal Vaccine 65+  Completed                                                                                                                                                CMS Preventative Services Quick Reference  Risk Factors Identified During Encounter  Chronic Pain: OTC analgesics as needed. Proper dosing schedule discussed.   Depression/Dysphoria: Current medication adjusted.  Follow up visit planned.  Fall Risk-High or  "Moderate: Discussed Fall Prevention in the home  Immunizations Discussed/Encouraged: Td, Influenza, Prevnar 20 (Pneumococcal 20-valent conjugate), Shingrix, COVID19, and RSV (Respiratory Syncytial Virus)  Inactivity/Sedentary: Patient was advised to exercise at least 150 minutes a week per CDC recommendations.  Polypharmacy: Medication List reviewed  Dental Screening Recommended  Vision Screening Recommended    The above risks/problems have been discussed with the patient.  Pertinent information has been shared with the patient in the After Visit Summary.  An After Visit Summary and PPPS were made available to the patient.    Follow Up:   Next Medicare Wellness visit to be scheduled in 1 year.          Additional E&M Note during same encounter follows:  Patient has multiple medical problems which are significant and separately identifiable that require additional work above and beyond the Medicare Wellness Visit.      Chief Complaint  Welcome To Medicare, Hypertension (6 week follow-up), Hypothyroidism (6 week follow-up/), and Depression (Patient thinks maybe she should seek counseling. )    Kavita Garcia is a 63 y.o. female who [facility]       Objective   Vital Signs:   Vitals:    10/02/24 1045   BP: 160/98   BP Location: Right arm   Patient Position: Sitting   Cuff Size: Adult   Pulse: (!) 127   Temp: 98.2 °F (36.8 °C)   TempSrc: Temporal   SpO2: 98%   Weight: 55.8 kg (123 lb)   Height: 166.4 cm (65.5\")   PainSc:   5       Wt Readings from Last 3 Encounters:   10/02/24 55.8 kg (123 lb)   08/21/24 59 kg (130 lb)   07/10/24 58.8 kg (129 lb 9.6 oz)     BP Readings from Last 3 Encounters:   10/02/24 160/98   08/21/24 158/82   07/01/24 135/62       Physical Exam    The following data was reviewed by LAMINE Weiss on 10/02/2024        Assessment & Plan   Diagnoses and all orders for this visit:    1. Hypertension, unspecified type (Primary)  -   "   Discontinue: lisinopril (PRINIVIL,ZESTRIL) 10 MG tablet; Take 2 tablets by mouth Daily.  Dispense: 30 tablet; Refill: 1  -     Blood Pressure Monitoring (Blood Pressure Cuff) misc; Use 1 Device Daily.  Dispense: 1 each; Refill: 0  -     lisinopril (PRINIVIL,ZESTRIL) 5 MG tablet; Take 1 tablet by mouth Daily.  Dispense: 30 tablet; Refill: 1    2. Acquired hypothyroidism  -     TSH    3. Welcome to Medicare preventive visit    4. Need for influenza vaccination  -     Cancel: Fluzone >6mos  -     Fluzone High-Dose 65+yrs ([id number])    5. Nausea  -     ondansetron ODT (ZOFRAN-ODT) 4 MG disintegrating tablet; Place 1 tablet on the tongue Every 8 (Eight) Hours As Needed for Nausea or Vomiting.  Dispense: 15 tablet; Refill: 0    6. Mild episode of recurrent major depressive disorder  -     Ambulatory Referral to Psychiatry  -     Ambulatory Referral to Psychology  -     desvenlafaxine (Pristiq) 100 MG 24 hr tablet; Take 1 tablet by mouth Daily.  Dispense: 90 tablet; Refill: 1    7. JOSE A (generalized anxiety disorder)  -     Ambulatory Referral to Psychiatry  -     Ambulatory Referral to Psychology  -     desvenlafaxine (Pristiq) 100 MG 24 hr tablet; Take 1 tablet by mouth Daily.  Dispense: 90 tablet; Refill: 1    8. Diarrhea, unspecified type          BMI is within normal parameters. No other follow-up for BMI required.       FOLLOW UP  Return in about 4 weeks (around 10/30/2024) for Hypertension, Anxiety, Depression.  Patient was given instructions and counseling regarding her condition or for health maintenance advice. Please see specific information pulled into the AVS if appropriate.     Georgette Schwartz, APRN  10/04/24  08:46 EDT

## 2024-09-30 NOTE — PROGRESS NOTES
Chief Complaint  Welcome To Medicare, Hypertension (6 week follow-up), Hypothyroidism (6 week follow-up/), and Depression (Patient thinks maybe she should seek counseling. )    Subjective          Kavita Garcia presents to [facility]  History of Present Illness    Hypertension  This is a chronic problem. Episode onset: has been off of medications x several years. Pertinent negatives include no anxiety, blurred vision, chest pain, headaches, malaise/fatigue, neck pain, orthopnea, palpitations, peripheral edema, PND, shortness of breath or sweats. Current antihypertension treatment includes nothing. There is no history of angina, kidney disease, CAD/MI, CVA, heart failure, left ventricular hypertrophy, PVD or retinopathy. There is no history of chronic renal disease, coarctation of the aorta, hyperaldosteronism, hypercortisolism, hyperparathyroidism, a hypertension causing med, pheochromocytoma, renovascular disease, sleep apnea or a thyroid problem.   Hypothyroidism  This is a chronic problem. Associated symptoms include swollen glands. Pertinent negatives include no abdominal pain, anorexia, arthralgias, change in bowel habit, chest pain, chills, congestion, coughing, diaphoresis, fatigue, fever, headaches, joint swelling, myalgias, nausea, neck pain, numbness, rash, sore throat, urinary symptoms, vertigo, visual change, vomiting or weakness.   Depression  Presents for initial visit. Symptoms include decreased concentration, depressed mood, excessive worry, feelings of hopelessness, insomnia, irritability, nervousness/anxiety, difficulty staying asleep and difficulty falling asleep. Patient is not experiencing: compulsions, confusion, dry mouth, feelings of worthlessness, hypersomnia, hyperventilation, impotence, muscle tension, obsessions, palpitations, panic, psychomotor agitation, psychomotor retardation, shortness of breath, suicidal ideas, suicidal planning,  thoughts of death, weight gain, weight loss, chest pain, feeling of choking, dizziness, malaise/fatigue, nausea and difficulty controlling mood.  Her past medical history is significant for depression.  Risk factors: lost her , moved. Risk factors: lost her , moved.   Insomnia  This is a new problem. Associated symptoms include swollen glands. Pertinent negatives include no abdominal pain, anorexia, arthralgias, change in bowel habit, chest pain, chills, congestion, coughing, diaphoresis, fatigue, fever, headaches, joint swelling, myalgias, nausea, neck pain, numbness, rash, sore throat, urinary symptoms, vertigo, visual change, vomiting or weakness.     Current Outpatient Medications   Medication Instructions    atorvastatin (LIPITOR) 40 mg, Oral, Daily    Blood Pressure Monitoring (Blood Pressure Cuff) misc 1 Device, Does not apply, Daily    cetirizine (ZYRTEC) 10 mg, Oral, Daily    desvenlafaxine (PRISTIQ) 100 mg, Oral, Daily    fluticasone (FLONASE) 50 MCG/ACT nasal spray 2 sprays, Nasal, Daily    gabapentin (NEURONTIN) 300 MG capsule Take 1 capsule by mouth 2 (Two) Times a Day.    levothyroxine (SYNTHROID) 175 mcg, Oral, Daily    lisinopril (PRINIVIL,ZESTRIL) 5 mg, Oral, Daily    naloxone (NARCAN) 4 MG/0.1ML nasal spray Call 911. Don't prime. [place] in 1 nostril for overdose. Repeat in 2-3 minutes in other nostril if no or minimal breathing/responsiveness.    ondansetron ODT (ZOFRAN-ODT) 4 mg, Translingual, Every 8 Hours PRN    oxyCODONE-acetaminophen (PERCOCET)  MG per tablet 1 tablet, Oral, Every 4 Hours PRN, for pain    traZODone (DESYREL) 50 mg, Oral, Nightly       The following portions of the patient's history were reviewed and updated as appropriate: allergies, current medications, past family history, past medical history, past social history, past surgical history, and problem list.    Objective   Vital Signs:   /98 (BP Location: Right arm, Patient Position: Sitting, Cuff Size:  "Adult)   Pulse (!) 127   Temp 98.2 °F (36.8 °C) (Temporal)   Ht 166.4 cm (65.5\")   Wt 55.8 kg (123 lb)   SpO2 98%   BMI 20.16 kg/m²     BP Readings from Last 3 Encounters:   10/02/24 160/98   08/21/24 158/82   07/01/24 135/62     Wt Readings from Last 3 Encounters:   10/02/24 55.8 kg (123 lb)   08/21/24 59 kg (130 lb)   07/10/24 58.8 kg (129 lb 9.6 oz)         Vision Screening (10/02/2024)  Edited by: Stephanie Wood   Right eye Left eye Both eyes   Without correction 20/70 20/200 20/70       BMI is within normal parameters. No other follow-up for BMI required.     Physical Exam     Appearance: No acute distress, well-nourished  Head: normocephalic, atraumatic  Eyes: extraocular movements intact, no scleral icterus, no conjunctival injection  Ears, Nose, and Throat: external ears normal, nares patent, moist mucous membranes  Cardiovascular: regular rate and rhythm. no murmurs, rubs, or gallops. no edema  Respiratory: breathing comfortably, symmetric chest rise, clear to auscultation bilaterally. No wheezes, rales, or rhonchi.  Neuro: alert and oriented to time, place, and person. Normal gait  Psych: normal mood and affect     Result Review :   The following data was reviewed by: LAMINE Weiss on 10/02/2024:  Common labs          2/16/2024    15:48 6/30/2024    13:57 8/21/2024    16:28   Common Labs   Glucose 96  124  86    BUN 9  10  8    Creatinine 0.97  0.75  1.11    Sodium 136  137  138    Potassium 3.7  3.2  4.4    Chloride 100  100  100    Calcium 9.2  9.3  9.7    Albumin  4.1  4.3    Total Bilirubin  0.3  0.2    Alkaline Phosphatase  109  104    AST (SGOT)  18  21    ALT (SGPT)  20  16    WBC 9.03  5.79  7.82    Hemoglobin 11.5  13.1  13.4    Hematocrit 35.9  37.3  40.2    Platelets 247  274  235    Total Cholesterol   229    Triglycerides   243    HDL Cholesterol   43    LDL Cholesterol    142             Lab Results   Component Value Date    INR 1.0 08/14/2020    BILIRUBINUR Negative " 06/30/2024            Assessment and Plan    Diagnoses and all orders for this visit:    1. Hypertension, unspecified type (Primary)  -     Discontinue: lisinopril (PRINIVIL,ZESTRIL) 10 MG tablet; Take 2 tablets by mouth Daily.  Dispense: 30 tablet; Refill: 1  -     Blood Pressure Monitoring (Blood Pressure Cuff) misc; Use 1 Device Daily.  Dispense: 1 each; Refill: 0  -     lisinopril (PRINIVIL,ZESTRIL) 5 MG tablet; Take 1 tablet by mouth Daily.  Dispense: 30 tablet; Refill: 1    2. Acquired hypothyroidism  -     TSH    3. Welcome to Medicare preventive visit    4. Need for influenza vaccination  -     Cancel: Fluzone >6mos  -     Fluzone High-Dose 65+yrs (1446-1543)    5. Nausea  -     ondansetron ODT (ZOFRAN-ODT) 4 MG disintegrating tablet; Place 1 tablet on the tongue Every 8 (Eight) Hours As Needed for Nausea or Vomiting.  Dispense: 15 tablet; Refill: 0    6. Mild episode of recurrent major depressive disorder  -     Ambulatory Referral to Psychiatry  -     Ambulatory Referral to Psychology  -     desvenlafaxine (Pristiq) 100 MG 24 hr tablet; Take 1 tablet by mouth Daily.  Dispense: 90 tablet; Refill: 1    7. JOSE A (generalized anxiety disorder)  -     Ambulatory Referral to Psychiatry  -     Ambulatory Referral to Psychology  -     desvenlafaxine (Pristiq) 100 MG 24 hr tablet; Take 1 tablet by mouth Daily.  Dispense: 90 tablet; Refill: 1    8. Diarrhea, unspecified type      - increasing pristiq to 100 mg.     - psych and ocunseling referral placed.     - adding 5 mg lisinopril to regimen         Medications Discontinued During This Encounter   Medication Reason    oxyCODONE (ROXICODONE) 10 MG tablet *Therapy completed    ondansetron ODT (ZOFRAN-ODT) 4 MG disintegrating tablet Reorder    lisinopril (PRINIVIL,ZESTRIL) 10 MG tablet Reorder    lisinopril (PRINIVIL,ZESTRIL) 10 MG tablet     desvenlafaxine (Pristiq) 50 MG 24 hr tablet           Follow Up   Return in about 4 weeks (around 10/30/2024) for Hypertension,  Anxiety, Depression.  Patient was given instructions and counseling regarding her condition or for health maintenance advice. Please see specific information pulled into the AVS if appropriate.       Georgette Schwartz, LAMINE  10/04/24  08:45 EDT

## 2024-10-02 ENCOUNTER — OFFICE VISIT (OUTPATIENT)
Dept: INTERNAL MEDICINE | Facility: CLINIC | Age: 64
End: 2024-10-02
Payer: MEDICARE

## 2024-10-02 VITALS
TEMPERATURE: 98.2 F | DIASTOLIC BLOOD PRESSURE: 98 MMHG | BODY MASS INDEX: 19.77 KG/M2 | SYSTOLIC BLOOD PRESSURE: 160 MMHG | WEIGHT: 123 LBS | HEIGHT: 66 IN | HEART RATE: 127 BPM | OXYGEN SATURATION: 98 %

## 2024-10-02 DIAGNOSIS — E03.9 ACQUIRED HYPOTHYROIDISM: ICD-10-CM

## 2024-10-02 DIAGNOSIS — Z23 NEED FOR INFLUENZA VACCINATION: ICD-10-CM

## 2024-10-02 DIAGNOSIS — F33.0 MILD EPISODE OF RECURRENT MAJOR DEPRESSIVE DISORDER: ICD-10-CM

## 2024-10-02 DIAGNOSIS — R19.7 DIARRHEA, UNSPECIFIED TYPE: ICD-10-CM

## 2024-10-02 DIAGNOSIS — F41.1 GAD (GENERALIZED ANXIETY DISORDER): ICD-10-CM

## 2024-10-02 DIAGNOSIS — Z00.00 WELCOME TO MEDICARE PREVENTIVE VISIT: ICD-10-CM

## 2024-10-02 DIAGNOSIS — I10 HYPERTENSION, UNSPECIFIED TYPE: Primary | ICD-10-CM

## 2024-10-02 DIAGNOSIS — R11.0 NAUSEA: ICD-10-CM

## 2024-10-02 LAB — TSH SERPL DL<=0.05 MIU/L-ACNC: 0.03 UIU/ML (ref 0.27–4.2)

## 2024-10-02 PROCEDURE — 84443 ASSAY THYROID STIM HORMONE: CPT | Performed by: NURSE PRACTITIONER

## 2024-10-02 RX ORDER — ADHESIVE BANDAGE 3/4"
1 BANDAGE TOPICAL DAILY
Qty: 1 EACH | Refills: 0 | Status: SHIPPED | OUTPATIENT
Start: 2024-10-02

## 2024-10-02 RX ORDER — LISINOPRIL 10 MG/1
20 TABLET ORAL DAILY
Qty: 30 TABLET | Refills: 1 | Status: SHIPPED | OUTPATIENT
Start: 2024-10-02 | End: 2024-10-02

## 2024-10-02 RX ORDER — OXYCODONE AND ACETAMINOPHEN 10; 325 MG/1; MG/1
1 TABLET ORAL EVERY 4 HOURS PRN
COMMUNITY

## 2024-10-02 RX ORDER — ONDANSETRON 4 MG/1
4 TABLET, ORALLY DISINTEGRATING ORAL EVERY 8 HOURS PRN
Qty: 15 TABLET | Refills: 0 | Status: SHIPPED | OUTPATIENT
Start: 2024-10-02

## 2024-10-02 RX ORDER — LISINOPRIL 5 MG/1
5 TABLET ORAL DAILY
Qty: 30 TABLET | Refills: 1 | Status: SHIPPED | OUTPATIENT
Start: 2024-10-02

## 2024-10-02 RX ORDER — DESVENLAFAXINE 100 MG/1
100 TABLET, EXTENDED RELEASE ORAL DAILY
Qty: 90 TABLET | Refills: 1 | Status: SHIPPED | OUTPATIENT
Start: 2024-10-02

## 2024-10-04 ENCOUNTER — TELEPHONE (OUTPATIENT)
Dept: INTERNAL MEDICINE | Facility: CLINIC | Age: 64
End: 2024-10-04
Payer: MEDICARE

## 2024-10-04 DIAGNOSIS — E03.9 ACQUIRED HYPOTHYROIDISM: Primary | ICD-10-CM

## 2024-10-04 RX ORDER — LEVOTHYROXINE SODIUM 150 UG/1
150 TABLET ORAL DAILY
Qty: 45 TABLET | Refills: 0 | Status: SHIPPED | OUTPATIENT
Start: 2024-10-04

## 2024-10-04 NOTE — TELEPHONE ENCOUNTER
Spoke with patient's daughter. Verified   Made aware of results and medication change.   Verbalized understanding

## 2024-10-09 ENCOUNTER — PATIENT OUTREACH (OUTPATIENT)
Dept: CASE MANAGEMENT | Facility: OTHER | Age: 64
End: 2024-10-09
Payer: MEDICARE

## 2024-10-09 DIAGNOSIS — M06.9 RHEUMATOID ARTHRITIS, INVOLVING UNSPECIFIED SITE, UNSPECIFIED WHETHER RHEUMATOID FACTOR PRESENT: ICD-10-CM

## 2024-10-09 DIAGNOSIS — I15.9 SECONDARY HYPERTENSION: Primary | ICD-10-CM

## 2024-10-09 NOTE — OUTREACH NOTE
AMBULATORY CASE MANAGEMENT NOTE    Names and Relationships of Patient/Support Persons: Contact: hanny salazar; Relationship: Emergency Contact -     [facility] Interim Update    I spoke with patient's daughter, Toña, regarding several referrals that were placed by PCP. Toña states that patient has a pain management appointment with [facility] set up. She states that someone called about the psychology and psychiatry referral but would not speak with her so patient will need to call back. She states she had not heard from anyone concerning the audiology referral.    Care Coordination    I reviewed the referral section in the patient's chart. Audiology and the hub attempted to call daughter's phone multiple times and left messages. I called Toña back, relayed this information and gave her the number to call and make an appointment with audiology. She is agreeable to this.    Saima VOGEL  Ambulatory Case Management    10/9/2024, 13:25 EDT

## 2024-10-24 ENCOUNTER — TELEPHONE (OUTPATIENT)
Dept: INTERNAL MEDICINE | Facility: CLINIC | Age: 64
End: 2024-10-24
Payer: MEDICARE

## 2024-10-24 NOTE — TELEPHONE ENCOUNTER
Person answering the phone stated Ms Garcia wasn't home.  LM asking them to have the pt conctact our office.    Concerning referrals

## 2024-10-31 DIAGNOSIS — I10 HYPERTENSION, UNSPECIFIED TYPE: ICD-10-CM

## 2024-10-31 RX ORDER — LISINOPRIL 5 MG/1
5 TABLET ORAL DAILY
Qty: 90 TABLET | Refills: 1 | Status: SHIPPED | OUTPATIENT
Start: 2024-10-31

## 2024-11-26 ENCOUNTER — TELEPHONE (OUTPATIENT)
Dept: CASE MANAGEMENT | Facility: OTHER | Age: 64
End: 2024-11-26
Payer: MEDICARE

## 2024-11-26 NOTE — TELEPHONE ENCOUNTER
Attempted to call patient's daughter, Toña. She is currently driving to go out of town. She states she will call me back at a more convenient time.

## 2024-12-23 ENCOUNTER — PATIENT OUTREACH (OUTPATIENT)
Dept: CASE MANAGEMENT | Facility: OTHER | Age: 64
End: 2024-12-23
Payer: MEDICARE

## 2024-12-23 DIAGNOSIS — E03.9 ACQUIRED HYPOTHYROIDISM: ICD-10-CM

## 2024-12-23 DIAGNOSIS — H91.90 HEARING LOSS, UNSPECIFIED HEARING LOSS TYPE, UNSPECIFIED LATERALITY: ICD-10-CM

## 2024-12-23 DIAGNOSIS — M06.9 RHEUMATOID ARTHRITIS, INVOLVING UNSPECIFIED SITE, UNSPECIFIED WHETHER RHEUMATOID FACTOR PRESENT: Primary | ICD-10-CM

## 2024-12-23 DIAGNOSIS — H93.13 TINNITUS OF BOTH EARS: ICD-10-CM

## 2024-12-23 RX ORDER — LEVOTHYROXINE SODIUM 150 UG/1
150 TABLET ORAL DAILY
Qty: 45 TABLET | Refills: 0 | Status: SHIPPED | OUTPATIENT
Start: 2024-12-23

## 2024-12-23 NOTE — TELEPHONE ENCOUNTER
Patient is currently out of levothyroxine. I have pended a refill. Please sign, if agreeable.    Thank you,  Saima   Rifampin Counseling: I discussed with the patient the risks of rifampin including but not limited to liver damage, kidney damage, red-orange body fluids, nausea/vomiting and severe allergy.

## 2024-12-31 ENCOUNTER — PATIENT OUTREACH (OUTPATIENT)
Dept: CASE MANAGEMENT | Facility: OTHER | Age: 64
End: 2024-12-31
Payer: MEDICARE

## 2024-12-31 DIAGNOSIS — I15.9 SECONDARY HYPERTENSION: ICD-10-CM

## 2024-12-31 DIAGNOSIS — E78.2 MIXED HYPERLIPIDEMIA: ICD-10-CM

## 2024-12-31 DIAGNOSIS — M06.9 RHEUMATOID ARTHRITIS, INVOLVING UNSPECIFIED SITE, UNSPECIFIED WHETHER RHEUMATOID FACTOR PRESENT: Primary | ICD-10-CM

## 2024-12-31 NOTE — OUTREACH NOTE
AMBULATORY CASE MANAGEMENT NOTE    Names and Relationships of Patient/Support Persons:  -     [facility] [place] End of Month Documentation    This Chronic Medical Management Care Plan for Kavita Garcia, 64 y.o. female, has been monitored and managed; reviewed; revised and a new plan of care implemented for the month of December.  A cumulative time of 42  minutes was spent on this patient record this month, including phone call with patient; electronic communication with primary care provider; chart review, discussing existing and needed future appointments, transportation, medication refills.    Regarding the patient's problems: has Rheumatoid arthritis; Chronic pain; Degeneration of intervertebral disc of lumbar region; Degeneration of intervertebral disc of thoracic region; Depression; Fibromyositis; Hyperlipidemia; Hypertension; Hypothyroidism; Other long term (current) drug therapy; Postlaminectomy syndrome, not elsewhere classified; Abdominal pain; Breakdown (mechanical) of int fix of vertebrae, init; Cervical spinal stenosis; Lumbar stenosis; Closed wedge compression fracture of T10 vertebra; Displacement of internal fixation device of vertebrae; Ileus, postoperative; Kyphosis of thoracolumbar region; Lumbar radiculopathy; Lumbar scoliosis; Neuropathy; Osteoporosis; Ankylosis of spine; Pseudarthrosis following spinal fusion; Pseudoarthrosis of spine; S/P lumbar fusion; Acquired postural kyphosis; Secondary kyphosis deformity of spine; Status post left hip replacement; Tobacco use; DDD (degenerative disc disease); Anxiety and depression; Nausea and vomiting, unspecified vomiting type; Hypokalemia; Right ureteral stone; History of diverticulitis; and Allergic rhinitis on their problem list., the following items were addressed: medical records; medications; referrals to community service providers and any changes can be found within the plan section of the note.  A detailed listing of time spent for chronic care management  is tracked within each outreach encounter.  Current medications include:  has a current medication list which includes the following prescription(s): atorvastatin, blood pressure cuff, cetirizine, desvenlafaxine, fluticasone, gabapentin, levothyroxine, lisinopril, naloxone, ondansetron odt, oxycodone-acetaminophen, and trazodone. and the patient is reported to be patient is noncompliant with medication protocol, patient has ran out of her levothyroxine,  Medications are reported to be non-effective in controlling symptoms and changes have been made to the medication protocol.  Regarding these diagnoses, referrals were made to the following provider(s): Rheumatology, DEXA scan, Ultrasound, Colonoscopy, Audiology, Pain Management, Behavioral Health.  All notes on chart for PCP to review.    The patient was monitored remotely for blood pressure; activity level; pain; medications; mood & behavior.    The patient's physical needs include:  help taking medications as prescribed; physical healthcare; medication education; hearing care; physician referral; resources for disability needs, Rheumatology, DEXA scan, US, Colonoscopy, Audiology, Pain Management, BH.     The patient's mental support needs include:  increased support; mental health referral; coordination of community providers    The patient's cognitive support needs include:  medication; increased support; coordination of community providers; emotional care; health care; household care    The patient's psychosocial support needs include:  coordination of community providers; medication management or adherence; need for increased support; no access to community activities; no opportunity for leisure activities    The patient's functional needs include: medication education; physical healthcare; hearing care; physician referral, Rheumatology, DEXA scan, US, Colonoscopy, Audiology, Pain Management, BH, Transportation    The patient's environmental needs include:   resources for disability needs    Care Plan overall comments:  Revised and Ongoing    Refer to previous outreach notes for more information on the areas listed above.    Monthly Billing Diagnoses  (M06.9) Rheumatoid arthritis, involving unspecified site, unspecified whether rheumatoid factor present    (I15.9) Secondary hypertension    (E78.2) Mixed hyperlipidemia    Medications   Medications have been reconciled    Care Plan progress this month:      Recently Modified Care Plans Updates made since 11/30/2024 12:00 AM      No recently modified care plans.               Current Specialty Plan of Care Status signed by both patient and provider    Instructions   Patient was provided an electronic copy of care plan  CCM services were explained and offered and patient has accepted these services.  Patient has given their written consent to receive CCM services and understands that this includes the authorization of electronic communication of medical information with the other treating providers.  Patient understands that they may stop CCM services at any time and these changes will be effective at the end of the calendar month and will effectively revocate the agreement of CCM services.  Patient understands that only one practitioner can furnish and be paid for CCM services during one calendar month.  Patient also understands that there may be co-payment or deductible fees in association with CCM services.  Patient will continue with at least monthly follow-up calls with the Ambulatory .    Saima VOGEL  Ambulatory Case Management    12/31/2024, 10:36 EST    Education Documentation  No documentation found.        Saima VOGEL  Ambulatory Case Management    12/31/2024, 10:36 EST

## 2025-01-07 ENCOUNTER — OFFICE VISIT (OUTPATIENT)
Dept: INTERNAL MEDICINE | Facility: CLINIC | Age: 65
End: 2025-01-07
Payer: MEDICARE

## 2025-01-07 VITALS
HEART RATE: 76 BPM | DIASTOLIC BLOOD PRESSURE: 82 MMHG | TEMPERATURE: 98.6 F | SYSTOLIC BLOOD PRESSURE: 152 MMHG | WEIGHT: 123 LBS | BODY MASS INDEX: 19.77 KG/M2 | HEIGHT: 66 IN | OXYGEN SATURATION: 97 %

## 2025-01-07 DIAGNOSIS — G47.00 INSOMNIA, UNSPECIFIED TYPE: ICD-10-CM

## 2025-01-07 DIAGNOSIS — E03.9 ACQUIRED HYPOTHYROIDISM: ICD-10-CM

## 2025-01-07 DIAGNOSIS — Z12.11 SCREENING FOR COLON CANCER: ICD-10-CM

## 2025-01-07 DIAGNOSIS — F41.1 GAD (GENERALIZED ANXIETY DISORDER): ICD-10-CM

## 2025-01-07 DIAGNOSIS — Z79.899 OTHER LONG TERM (CURRENT) DRUG THERAPY: ICD-10-CM

## 2025-01-07 DIAGNOSIS — F17.219 CIGARETTE NICOTINE DEPENDENCE WITH NICOTINE-INDUCED DISORDER: ICD-10-CM

## 2025-01-07 DIAGNOSIS — M79.644 PAIN OF FINGER OF RIGHT HAND: ICD-10-CM

## 2025-01-07 DIAGNOSIS — I10 HYPERTENSION, UNSPECIFIED TYPE: Primary | ICD-10-CM

## 2025-01-07 DIAGNOSIS — E78.2 MIXED HYPERLIPIDEMIA: ICD-10-CM

## 2025-01-07 DIAGNOSIS — F33.0 MILD EPISODE OF RECURRENT MAJOR DEPRESSIVE DISORDER: ICD-10-CM

## 2025-01-07 DIAGNOSIS — M06.9 RHEUMATOID ARTHRITIS, INVOLVING UNSPECIFIED SITE, UNSPECIFIED WHETHER RHEUMATOID FACTOR PRESENT: ICD-10-CM

## 2025-01-07 LAB
ALBUMIN SERPL-MCNC: 4.2 G/DL (ref 3.5–5.2)
ALBUMIN/GLOB SERPL: 1.4 G/DL
ALP SERPL-CCNC: 132 U/L (ref 39–117)
ALT SERPL W P-5'-P-CCNC: 13 U/L (ref 1–33)
ANION GAP SERPL CALCULATED.3IONS-SCNC: 11 MMOL/L (ref 5–15)
AST SERPL-CCNC: 21 U/L (ref 1–32)
BASOPHILS # BLD AUTO: 0.06 10*3/MM3 (ref 0–0.2)
BASOPHILS NFR BLD AUTO: 0.8 % (ref 0–1.5)
BILIRUB SERPL-MCNC: 0.4 MG/DL (ref 0–1.2)
BUN SERPL-MCNC: 8 MG/DL (ref 8–23)
BUN/CREAT SERPL: 8.4 (ref 7–25)
CALCIUM SPEC-SCNC: 9.4 MG/DL (ref 8.6–10.5)
CHLORIDE SERPL-SCNC: 101 MMOL/L (ref 98–107)
CHOLEST SERPL-MCNC: 146 MG/DL (ref 0–200)
CHROMATIN AB SERPL-ACNC: <10 IU/ML (ref 0–14)
CK SERPL-CCNC: 108 U/L (ref 20–180)
CO2 SERPL-SCNC: 25 MMOL/L (ref 22–29)
CREAT SERPL-MCNC: 0.95 MG/DL (ref 0.57–1)
CRP SERPL-MCNC: <0.3 MG/DL (ref 0–0.5)
DEPRECATED RDW RBC AUTO: 39.3 FL (ref 37–54)
EGFRCR SERPLBLD CKD-EPI 2021: 66.2 ML/MIN/1.73
EOSINOPHIL # BLD AUTO: 0.21 10*3/MM3 (ref 0–0.4)
EOSINOPHIL NFR BLD AUTO: 2.9 % (ref 0.3–6.2)
ERYTHROCYTE [DISTWIDTH] IN BLOOD BY AUTOMATED COUNT: 11.9 % (ref 12.3–15.4)
ERYTHROCYTE [SEDIMENTATION RATE] IN BLOOD: 6 MM/HR (ref 0–30)
GLOBULIN UR ELPH-MCNC: 2.9 GM/DL
GLUCOSE SERPL-MCNC: 103 MG/DL (ref 65–99)
HCT VFR BLD AUTO: 41.2 % (ref 34–46.6)
HDLC SERPL-MCNC: 54 MG/DL (ref 40–60)
HGB BLD-MCNC: 13.9 G/DL (ref 12–15.9)
IMM GRANULOCYTES # BLD AUTO: 0.02 10*3/MM3 (ref 0–0.05)
IMM GRANULOCYTES NFR BLD AUTO: 0.3 % (ref 0–0.5)
LDLC SERPL CALC-MCNC: 67 MG/DL (ref 0–100)
LDLC/HDLC SERPL: 1.17 {RATIO}
LYMPHOCYTES # BLD AUTO: 2.38 10*3/MM3 (ref 0.7–3.1)
LYMPHOCYTES NFR BLD AUTO: 32.7 % (ref 19.6–45.3)
MCH RBC QN AUTO: 30.6 PG (ref 26.6–33)
MCHC RBC AUTO-ENTMCNC: 33.7 G/DL (ref 31.5–35.7)
MCV RBC AUTO: 90.7 FL (ref 79–97)
MONOCYTES # BLD AUTO: 0.48 10*3/MM3 (ref 0.1–0.9)
MONOCYTES NFR BLD AUTO: 6.6 % (ref 5–12)
NEUTROPHILS NFR BLD AUTO: 4.13 10*3/MM3 (ref 1.7–7)
NEUTROPHILS NFR BLD AUTO: 56.7 % (ref 42.7–76)
NRBC BLD AUTO-RTO: 0 /100 WBC (ref 0–0.2)
PLATELET # BLD AUTO: 247 10*3/MM3 (ref 140–450)
PMV BLD AUTO: 12.4 FL (ref 6–12)
POTASSIUM SERPL-SCNC: 4.1 MMOL/L (ref 3.5–5.2)
PROT SERPL-MCNC: 7.1 G/DL (ref 6–8.5)
RBC # BLD AUTO: 4.54 10*6/MM3 (ref 3.77–5.28)
SODIUM SERPL-SCNC: 137 MMOL/L (ref 136–145)
TRIGL SERPL-MCNC: 144 MG/DL (ref 0–150)
TSH SERPL DL<=0.05 MIU/L-ACNC: 0.53 UIU/ML (ref 0.27–4.2)
VLDLC SERPL-MCNC: 25 MG/DL (ref 5–40)
WBC NRBC COR # BLD AUTO: 7.28 10*3/MM3 (ref 3.4–10.8)

## 2025-01-07 PROCEDURE — 84443 ASSAY THYROID STIM HORMONE: CPT | Performed by: NURSE PRACTITIONER

## 2025-01-07 PROCEDURE — 99406 BEHAV CHNG SMOKING 3-10 MIN: CPT | Performed by: NURSE PRACTITIONER

## 2025-01-07 PROCEDURE — 86038 ANTINUCLEAR ANTIBODIES: CPT | Performed by: NURSE PRACTITIONER

## 2025-01-07 PROCEDURE — 82550 ASSAY OF CK (CPK): CPT | Performed by: NURSE PRACTITIONER

## 2025-01-07 PROCEDURE — 80061 LIPID PANEL: CPT | Performed by: NURSE PRACTITIONER

## 2025-01-07 PROCEDURE — 86200 CCP ANTIBODY: CPT | Performed by: NURSE PRACTITIONER

## 2025-01-07 PROCEDURE — 85025 COMPLETE CBC W/AUTO DIFF WBC: CPT | Performed by: NURSE PRACTITIONER

## 2025-01-07 PROCEDURE — 86140 C-REACTIVE PROTEIN: CPT | Performed by: NURSE PRACTITIONER

## 2025-01-07 PROCEDURE — 80053 COMPREHEN METABOLIC PANEL: CPT | Performed by: NURSE PRACTITIONER

## 2025-01-07 PROCEDURE — 86431 RHEUMATOID FACTOR QUANT: CPT | Performed by: NURSE PRACTITIONER

## 2025-01-07 PROCEDURE — 1160F RVW MEDS BY RX/DR IN RCRD: CPT | Performed by: NURSE PRACTITIONER

## 2025-01-07 PROCEDURE — 1125F AMNT PAIN NOTED PAIN PRSNT: CPT | Performed by: NURSE PRACTITIONER

## 2025-01-07 PROCEDURE — 3079F DIAST BP 80-89 MM HG: CPT | Performed by: NURSE PRACTITIONER

## 2025-01-07 PROCEDURE — 1159F MED LIST DOCD IN RCRD: CPT | Performed by: NURSE PRACTITIONER

## 2025-01-07 PROCEDURE — 3077F SYST BP >= 140 MM HG: CPT | Performed by: NURSE PRACTITIONER

## 2025-01-07 PROCEDURE — 99214 OFFICE O/P EST MOD 30 MIN: CPT | Performed by: NURSE PRACTITIONER

## 2025-01-07 PROCEDURE — 85652 RBC SED RATE AUTOMATED: CPT | Performed by: NURSE PRACTITIONER

## 2025-01-07 RX ORDER — BUSPIRONE HYDROCHLORIDE 5 MG/1
5 TABLET ORAL 3 TIMES DAILY
Qty: 90 TABLET | Refills: 1 | Status: SHIPPED | OUTPATIENT
Start: 2025-01-07

## 2025-01-07 RX ORDER — VARENICLINE TARTRATE 0.5 (11)-1
KIT ORAL
Qty: 1 EACH | Refills: 0 | Status: SHIPPED | OUTPATIENT
Start: 2025-01-07 | End: 2025-02-04

## 2025-01-07 RX ORDER — TRAZODONE HYDROCHLORIDE 50 MG/1
50 TABLET, FILM COATED ORAL NIGHTLY
Qty: 90 TABLET | Refills: 1 | Status: SHIPPED | OUTPATIENT
Start: 2025-01-07

## 2025-01-07 RX ORDER — CYCLOBENZAPRINE HCL 5 MG
5 TABLET ORAL 2 TIMES DAILY PRN
COMMUNITY
Start: 2024-12-03

## 2025-01-07 RX ORDER — VARENICLINE TARTRATE 1 MG/1
1 TABLET, FILM COATED ORAL 2 TIMES DAILY
Qty: 56 TABLET | Refills: 1 | Status: SHIPPED | OUTPATIENT
Start: 2025-02-04 | End: 2025-04-01

## 2025-01-07 NOTE — PROGRESS NOTES
Chief Complaint  Hypertension (4 week follow-up. Patient is taking her Lisinopril as needed. ), Anxiety (4 week follow-up/), Depression (4 week follow-up/), Hand Injury (Right hand injury, pain management told her she needs a referral to hand specialist. ), and Nicotine Dependence (Patient would like to discuss quitting. )    Subjective          Kavita Garcia presents to [facility]  Nicotine Dependence  Presents for initial visit. Symptoms include cravings, decreased concentration and irritability. Symptoms are negative for fatigue, headache, insomnia and sore throat. Preferred tobacco types include cigarettes. Her urge triggers include stress. Number of cigarettes per day: 1/2 ppd. Past treatments include nothing. Kavita is ready to quit. There is no history of alcohol abuse and drug use.       History of Present Illness    1/2pack per day       Hypertension  This is a chronic problem. Episode onset: has been off of medications x several years. Pertinent negatives include no anxiety, blurred vision, chest pain, headaches, malaise/fatigue, neck pain, orthopnea, palpitations, peripheral edema, PND, shortness of breath or sweats. Current antihypertension treatment includes nothing. There is no history of angina, kidney disease, CAD/MI, CVA, heart failure, left ventricular hypertrophy, PVD or retinopathy. There is no history of chronic renal disease, coarctation of the aorta, hyperaldosteronism, hypercortisolism, hyperparathyroidism, a hypertension causing med, pheochromocytoma, renovascular disease, sleep apnea or a thyroid problem.   Hypothyroidism  This is a chronic problem. Associated symptoms include swollen glands. Pertinent negatives include no abdominal pain, anorexia, arthralgias, change in bowel habit, chest pain, chills, congestion, coughing, diaphoresis, fatigue, fever, headaches, joint swelling, myalgias, nausea, neck pain, numbness, rash, sore throat,  urinary symptoms, vertigo, visual change, vomiting or weakness.   Depression  Presents for f/u visit. Symptoms include decreased concentration, depressed mood, excessive worry, feelings of hopelessness, insomnia, irritability, nervousness/anxiety, difficulty staying asleep and difficulty falling asleep. Patient is not experiencing: compulsions, confusion, dry mouth, feelings of worthlessness, hypersomnia, hyperventilation, impotence, muscle tension, obsessions, palpitations, panic, psychomotor agitation, psychomotor retardation, shortness of breath, suicidal ideas, suicidal planning, thoughts of death, weight gain, weight loss, chest pain, feeling of choking, dizziness, malaise/fatigue, nausea and difficulty controlling mood.  Her past medical history is significant for depression.  Risk factors: lost her , moved. Risk factors: lost her , moved.   Insomnia  This is a new problem. Associated symptoms include swollen glands. Pertinent negatives include no abdominal pain, anorexia, arthralgias, change in bowel habit, chest pain, chills, congestion, coughing, diaphoresis, fatigue, fever, headaches, joint swelling, myalgias, nausea, neck pain, numbness, rash, sore throat, urinary symptoms, vertigo, visual change, vomiting or weakness.       Current Outpatient Medications   Medication Instructions    atorvastatin (LIPITOR) 40 mg, Oral, Daily    Blood Pressure Monitoring (Blood Pressure Cuff) misc 1 Device, Not Applicable, Daily    busPIRone (BUSPAR) 5 mg, Oral, 3 Times Daily    cetirizine (ZYRTEC) 10 mg, Oral, Daily    cyclobenzaprine (FLEXERIL) 5 mg, 2 Times Daily PRN    desvenlafaxine (PRISTIQ) 100 mg, Oral, Daily    fluticasone (FLONASE) 50 MCG/ACT nasal spray 2 sprays, Nasal, Daily    gabapentin (NEURONTIN) 300 MG capsule Take 1 capsule by mouth 2 (Two) Times a Day.    levothyroxine (SYNTHROID, LEVOTHROID) 150 mcg, Oral, Daily    lisinopril (PRINIVIL,ZESTRIL) 5 mg, Oral, Daily    naloxone (NARCAN) 4  "MG/0.1ML nasal spray Call 911. Don't prime. Nadeau in 1 nostril for overdose. Repeat in 2-3 minutes in other nostril if no or minimal breathing/responsiveness.    ondansetron ODT (ZOFRAN-ODT) 4 mg, Translingual, Every 8 Hours PRN    oxyCODONE-acetaminophen (PERCOCET)  MG per tablet 1 tablet, Every 4 Hours PRN    traZODone (DESYREL) 50 mg, Oral, Nightly    [START ON 2/4/2025] varenicline (CHANTIX CONTINUING MONTH CANDY) 1 mg, Oral, 2 Times Daily    Varenicline Tartrate, Starter, 0.5 MG X 11 & 1 MG X 42 tablet therapy pack Take 0.5 mg by mouth Daily for 3 days, THEN 0.5 mg 2 (Two) Times a Day for 4 days, THEN 1 mg 2 (Two) Times a Day for 21 days. Take 0.5 mg po daily x 3 days, then 0.5 mg po bid x 4 days, then 1 mg po bid       The following portions of the patient's history were reviewed and updated as appropriate: allergies, current medications, past family history, past medical history, past social history, past surgical history, and problem list.    Objective   Vital Signs:   /82 (BP Location: Right arm, Patient Position: Sitting, Cuff Size: Adult)   Pulse 76   Temp 98.6 °F (37 °C) (Temporal)   Ht 166.4 cm (65.5\")   Wt 55.8 kg (123 lb)   SpO2 97%   BMI 20.16 kg/m²     BP Readings from Last 3 Encounters:   01/07/25 152/82   10/02/24 160/98   08/21/24 158/82     Wt Readings from Last 3 Encounters:   01/07/25 55.8 kg (123 lb)   10/02/24 55.8 kg (123 lb)   08/21/24 59 kg (130 lb)     BMI is within normal parameters. No other follow-up for BMI required.     Physical Exam     Appearance: No acute distress, well-nourished  Head: normocephalic, atraumatic  Eyes: extraocular movements intact, no scleral icterus, no conjunctival injection  Ears, Nose, and Throat: external ears normal, nares patent, moist mucous membranes  Cardiovascular: regular rate and rhythm. no murmurs, rubs, or gallops. no edema  Respiratory: breathing comfortably, symmetric chest rise, clear to auscultation bilaterally. No wheezes, " rales, or rhonchi.  Neuro: alert and oriented to time, place, and person. Normal gait  Psych: normal mood and affect     Physical Exam        Result Review :   The following data was reviewed by: LAMINE Weiss on 01/07/2025:  Common labs          6/30/2024    13:57 8/21/2024    16:28 1/7/2025    14:21   Common Labs   Glucose 124  86  103    BUN 10  8  8    Creatinine 0.75  1.11  0.95    Sodium 137  138  137    Potassium 3.2  4.4  4.1    Chloride 100  100  101    Calcium 9.3  9.7  9.4    Albumin 4.1  4.3  4.2    Total Bilirubin 0.3  0.2  0.4    Alkaline Phosphatase 109  104  132    AST (SGOT) 18  21  21    ALT (SGPT) 20  16  13    WBC 5.79  7.82  7.28    Hemoglobin 13.1  13.4  13.9    Hematocrit 37.3  40.2  41.2    Platelets 274  235  247    Total Cholesterol  229  146    Triglycerides  243  144    HDL Cholesterol  43  54    LDL Cholesterol   142  67        Results           Lab Results   Component Value Date    INR 1.0 08/14/2020    BILIRUBINUR Negative 06/30/2024            Assessment and Plan    Diagnoses and all orders for this visit:    1. Hypertension, unspecified type (Primary)  Assessment & Plan:  Hypertension is uncontrolled  Dietary sodium restriction.  Stop smoking.  Ambulatory blood pressure monitoring.  Blood pressure will be reassessedin 4 weeks.    - keep home BP log to determine need to make adjustments to medications     Orders:  -     Comprehensive Metabolic Panel  -     CBC & Differential    2. Mild episode of recurrent major depressive disorder  -     Comprehensive Metabolic Panel  -     CBC & Differential    3. JOSE A (generalized anxiety disorder)  -     Comprehensive Metabolic Panel  -     CBC & Differential  -     busPIRone (BUSPAR) 5 MG tablet; Take 1 tablet by mouth 3 (Three) Times a Day.  Dispense: 90 tablet; Refill: 1    4. Insomnia, unspecified type  Comments:  new dx today; start trazodone; sleep hygeine  Orders:  -     traZODone (DESYREL) 50 MG tablet; Take 1 tablet by mouth  Every Night.  Dispense: 90 tablet; Refill: 1  -     Comprehensive Metabolic Panel  -     CBC & Differential    5. Pain of finger of right hand  -     Sedimentation rate  -     Rheumatoid Factor  -     C-reactive protein  -     Cyclic citrul peptide antibody, IgG/IgA  -     CK  -     DAMIEN Direct Reflex to 11 Biomarker  -     XR Finger 2+ View Right; Future    6. Acquired hypothyroidism  Assessment & Plan:  Will recheck TSH today and adjust medications as needed    Orders:  -     TSH    7. Mixed hyperlipidemia  -     Lipid Panel    8. Cigarette nicotine dependence with nicotine-induced disorder  -     Varenicline Tartrate, Starter, 0.5 MG X 11 & 1 MG X 42 tablet therapy pack; Take 0.5 mg by mouth Daily for 3 days, THEN 0.5 mg 2 (Two) Times a Day for 4 days, THEN 1 mg 2 (Two) Times a Day for 21 days. Take 0.5 mg po daily x 3 days, then 0.5 mg po bid x 4 days, then 1 mg po bid  Dispense: 1 each; Refill: 0  -     varenicline (Chantix Continuing Month Pak) 1 MG tablet; Take 1 tablet by mouth 2 (Two) Times a Day for 56 days.  Dispense: 56 tablet; Refill: 1    9. Screening for colon cancer  -     Ambulatory Referral For Screening Colonoscopy    10. Other long term (current) drug therapy    11. Rheumatoid arthritis, involving unspecified site, unspecified whether rheumatoid factor present      Kavita Garcia  reports that she has been smoking cigarettes. She started smoking about 40 years ago. She has a 12.5 pack-year smoking history. She has never used smokeless tobacco. I have educated her on the risk of diseases from using tobacco products such as cancer, COPD, heart disease, and cataracts.     I advised her to quit and she is willing to quit. We have discussed the following method/s for tobacco cessation:  Prescription Medication.  Together we have set a quit date for 1 month from today.  She will follow up with me in 3 months or sooner to check on her progress.    I spent 5 minutes counseling the patient.          Assessment & Plan      Medications Discontinued During This Encounter   Medication Reason    traZODone (DESYREL) 50 MG tablet Reorder          Follow Up   Return in about 3 months (around 4/7/2025).  Patient was given instructions and counseling regarding her condition or for health maintenance advice. Please see specific information pulled into the AVS if appropriate.       LAMINE Weiss  01/08/25  13:28 EST      Patient or patient representative verbalized consent for the use of Ambient Listening during the visit with  LAMNIE Weiss for chart documentation. 1/8/2025  13:19 EST

## 2025-01-08 PROBLEM — E87.6 HYPOKALEMIA: Status: RESOLVED | Noted: 2024-02-05 | Resolved: 2025-01-08

## 2025-01-08 PROBLEM — N20.1 RIGHT URETERAL STONE: Status: RESOLVED | Noted: 2024-06-30 | Resolved: 2025-01-08

## 2025-01-08 NOTE — ASSESSMENT & PLAN NOTE
Hypertension is uncontrolled  Dietary sodium restriction.  Stop smoking.  Ambulatory blood pressure monitoring.  Blood pressure will be reassessedin 4 weeks.    - keep home BP log to determine need to make adjustments to medications

## 2025-01-09 LAB
ANA SER QL: NEGATIVE
CCP IGA+IGG SERPL IA-ACNC: 2 UNITS (ref 0–19)

## 2025-01-13 ENCOUNTER — PRIOR AUTHORIZATION (OUTPATIENT)
Dept: INTERNAL MEDICINE | Facility: CLINIC | Age: 65
End: 2025-01-13
Payer: MEDICARE

## 2025-01-13 NOTE — TELEPHONE ENCOUNTER
PA required:    Varenicline Tartrate, Starter, 0.5 MG X 11 & 1 MG X 42 tablet therapy pack (01/07/2025)       (Key: [id number])    PA approved per CoverMyMeds

## 2025-01-13 NOTE — TELEPHONE ENCOUNTER
PA Required:    varenicline (Chantix Continuing Month Pak) 1 MG tablet (02/04/2025)     (Key: [id number])    PA approved per CoverMyMeds

## 2025-01-22 ENCOUNTER — PATIENT OUTREACH (OUTPATIENT)
Dept: CASE MANAGEMENT | Facility: OTHER | Age: 65
End: 2025-01-22
Payer: MEDICARE

## 2025-01-22 ENCOUNTER — TELEPHONE (OUTPATIENT)
Dept: CASE MANAGEMENT | Facility: OTHER | Age: 65
End: 2025-01-22
Payer: MEDICARE

## 2025-01-22 DIAGNOSIS — R11.0 NAUSEA: ICD-10-CM

## 2025-01-22 DIAGNOSIS — G47.00 INSOMNIA, UNSPECIFIED TYPE: ICD-10-CM

## 2025-01-22 DIAGNOSIS — I15.9 SECONDARY HYPERTENSION: Primary | ICD-10-CM

## 2025-01-22 RX ORDER — ONDANSETRON 4 MG/1
4 TABLET, ORALLY DISINTEGRATING ORAL EVERY 8 HOURS PRN
Qty: 15 TABLET | Refills: 0 | Status: SHIPPED | OUTPATIENT
Start: 2025-01-22

## 2025-01-22 NOTE — TELEPHONE ENCOUNTER
"I spoke with the patient on the phone today. There are a couple of issues/concerns she has:    1-She has not received the Buspar from her pharmacy([facility]) but when we were talking, she stated that she has been on Buspar before and did not like the side effect of it. It made everything taste bad. She would like to change this to another medication.    2-She stated that the insurance company sent her a letter stating that they aren't going to cover Flexeril any longer. Have you been notified of this? Does a PA need to be done? Is there a different medication that can be prescribed that is covered? She takes this medication every night and has 5 pills left.    3-The patient is checking her BP once a day. She is only taking her lisinipril if the \"top number is 140 or more\". She is worried that it will drop her BP too low if she takes it when her BP is within normal range. Please advise.    4-She did not receive and start the Chantix until 1/19 and she is still smoking 10 cig a day. She does still plan to quit smoking.    Thank you,  KWAME Landaverde  "

## 2025-01-22 NOTE — OUTREACH NOTE
AMBULATORY CASE MANAGEMENT NOTE    Names and Relationships of Patient/Support Persons: Contact: Kavita Garcia; Relationship: Self -     CCM Interim Update    I spoke with the patient on the phone for an extended period of time today. See full patient assessment below.    Patient states she is going to follow up on colonoscopy and DEXA appointments.    She has not received the trazodone prescription from her mail in pharmacy. I have advised her to call me if she has not received it by the end of the week.    Patient did not receive the Chantix until 1/19. She is taking it but she is still smoking 10 cig/day. The is still to quit smoking.    Adult Patient Profile  Questions/Answers      Flowsheet Row Most Recent Value   Symptoms/Conditions Managed at Home behavioral health, cardiovascular, chronic pain, endocrine, gastrointestinal, genitourinary, musculoskeletal, peripheral/neurovascular   Barriers to Managing Health medication side effects, stress of chronic illness, understanding health advice   Additional Documentation Behavioral Health Symptoms/Conditions Management (Group), Gastrointestinal Symptoms/Conditions Management (Group), Genitourinary Symptoms/Conditions Management (Group)   Cardiovascular Symptoms/Conditions high blood cholesterol, hypertension   Cardiovascular Management Strategies medication therapy, routine screening   Cardiovascular Self-Management Outcome well   Cardiovascular Comment Lipid panel drawn 1/7/2025 was WNL. Patient's BP remains a little elevated. Patient is inconsistent with taking medication. Sent electronic communication to PCP regarding this. Patient agrees to check BP once a day and keep a log for next appointment   Endocrine Symptoms/Conditions thyroid disorder   Endocrine Management Strategies medication therapy, routine screening   Endocrine Self-Management Outcome very well   Endocrine Comment TSH WNL on 1/7/2025. patient reports taking medication as prescribed  "  Gastrointestinal Symptoms/Conditions reflux/heartburn, diarrhea   Gastrointestinal Management Strategies medication therapy   Gastrointestinal Self-Management Outcome well   Gastrointestinal Comment Patient states that zofran helps the diarrhea she has at times. Refill sent in to PCP.   Genitourinary Self-Management Outcome unsure   Genitourinary Comment did not discuss this visit   HEENT Comment Patient states she is not having issues with her ears this visit   Immunological Symptoms/Conditions rheumatoid arthritis   Immunological Management Strategies routine screening   Immunological Comment Patient has an appointment with Rheumatology on 2/11/2025   Musculoskeletal Symptoms/Conditions back pain, joint pain, osteoarthritis   Musculoskeletal Management Strategies medication therapy, routine screening   Musculoskeletal Self-Management Outcome unsure   Musculoskeletal Comment Palliative care through [facility] manages patient's gabapentin and percocet. Gabapentin was recently increased to 300mg 4 times a day. This was changed on the patient's medication list.   Peripheral Neurovascular Symptoms/Conditions neuropathy   Peripheral Neurovascular Management Strategies routine screening, medication therapy   Peripheral Neurovascular Self-Management Outcome well   Peripheral Neurovascular Comment Palliatice care through [facility] manages patient's gabapentin.   Behavioral Health Symptoms/Conditions anxiety, depression   Behavioral Management Strategies medication therapy, counseling   Behavioral Health Self-Management Outcome not well   Behavioral Health Comment Patient has an appointment with  on 1/29/2025. Patient was prescribed Buspar by PCP on 1/7/2025 but patient has not received this medication through [facility] yet. She also stated today in a phone conversation that she has been on Buspar previously and \"it made everything taste bad\". She would prefer to be on a different medication than Buspar. Electronic " communication sent to PCP regarding this.   Taking Medications Not Prescribed no   Missed Doses of Prescribed Medications During Past Week yes   Taken Prescribed Medications at Different Time or Schedule During Past Week no   Taken More or Less Medication Than Prescribed yes   Barriers to Taking Medication as Prescribed don't think I need it          Patient has no further needs at this time.    Care Coordination    I sent electronic communication to the PCP regarding her Buspar and Flexeril prescriptions.        Saima VOGEL  Ambulatory Case Management    1/22/2025, 12:40 EST

## 2025-01-24 NOTE — TELEPHONE ENCOUNTER
Brendan she and I will need to discuss a new medication further  2.   Brendan have you heard about the flexeril issue from her insurance?  3.   She and I discussed her BP at appt. If she needs further guidance can schedule her back or do mychart video visit. If she will record her BP daily this will be helpful. She may be able to come off of the medication   4.   Yay! :)

## 2025-01-27 ENCOUNTER — PATIENT OUTREACH (OUTPATIENT)
Dept: CASE MANAGEMENT | Facility: OTHER | Age: 65
End: 2025-01-27
Payer: MEDICARE

## 2025-01-27 DIAGNOSIS — I15.9 SECONDARY HYPERTENSION: Primary | ICD-10-CM

## 2025-01-27 NOTE — OUTREACH NOTE
AMBULATORY CASE MANAGEMENT NOTE    Names and Relationships of Patient/Support Persons: Contact: Kavita Garcia; Relationship: Self -     CCM Interim Update    I called patient back with information from PCP. The PCP's MA attempted a PA for the patient's flexeril and it was not needed.    I also encouraged her to continue checking her BP daily and keeping a log of it to bring to her next appointment with PCP. She is agreeable to this.    Denies further needs at this time.    Saima VOGEL  Ambulatory Case Management    1/27/2025, 14:03 EST
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
## 2025-01-29 ENCOUNTER — OFFICE VISIT (OUTPATIENT)
Dept: PSYCHIATRY | Facility: CLINIC | Age: 65
End: 2025-01-29
Payer: MEDICARE

## 2025-01-29 VITALS
HEART RATE: 79 BPM | HEIGHT: 66 IN | DIASTOLIC BLOOD PRESSURE: 80 MMHG | BODY MASS INDEX: 20.06 KG/M2 | SYSTOLIC BLOOD PRESSURE: 138 MMHG | WEIGHT: 124.8 LBS

## 2025-01-29 DIAGNOSIS — F41.1 GAD (GENERALIZED ANXIETY DISORDER): ICD-10-CM

## 2025-01-29 DIAGNOSIS — F43.21 GRIEF REACTION: ICD-10-CM

## 2025-01-29 DIAGNOSIS — F32.1 CURRENT MODERATE EPISODE OF MAJOR DEPRESSIVE DISORDER, UNSPECIFIED WHETHER RECURRENT: Primary | ICD-10-CM

## 2025-01-29 RX ORDER — OMEGA-3S/DHA/EPA/FISH OIL/D3 300MG-1000
CAPSULE ORAL
COMMUNITY

## 2025-01-29 NOTE — TREATMENT PLAN
Multi-Disciplinary Problems (from Behavioral Health Treatment Plan)      Active Problems       Problem: Anxiety  Start Date: 01/29/25      Problem Details: The patient self-scales this problem as a 3 with 10 being the worst.          Goal Priority Start Date Expected End Date End Date    Patient will develop and implement behavioral and cognitive strategies to reduce anxiety and irrational fears. -- 01/29/25 07/30/25 --    Goal Details: Progress toward goal:  The patient self-scales their progress related to this goal as a 3 with 10 being the worst.          Goal Intervention Frequency Start Date End Date    Help patient explore past emotional issues in relation to present anxiety. Weekly 01/29/25 --    Intervention Details: Duration of treatment until remission of symptoms.          Goal Intervention Frequency Start Date End Date    Help patient develop an awareness of their cognitive and physical responses to anxiety. Weekly 01/29/25 --    Intervention Details: Duration of treatment until remission of symptoms.                  Problem: Grief and Loss  Start Date: 01/29/25      Problem Details: The patient self-scales this problem as a 3 with 10 being the worst.          Goal Priority Start Date Expected End Date End Date    Patient will process feelings and identify and implement specific ways to facilitate the grieving process. -- 01/29/25 07/30/25 --    Goal Details: Progress toward goal:  The patient self-scales their progress related to this goal as a 3 with 10 being the worst.          Goal Intervention Frequency Start Date End Date    Assist patient in identifying and processing feelings about losses. Weekly 01/29/25 --    Intervention Details: Duration of treatment until remission of symptoms.          Goal Intervention Frequency Start Date End Date    Identify ways to grieve effectively and utilize healthy support systems Weekly 01/29/25 --    Intervention Details: Duration of treatment until remission of  symptoms.                          Reviewed By       Elizabeth Wiggins, APRN 01/29/25 [id number]

## 2025-01-31 ENCOUNTER — PATIENT OUTREACH (OUTPATIENT)
Dept: CASE MANAGEMENT | Facility: OTHER | Age: 65
End: 2025-01-31
Payer: MEDICARE

## 2025-01-31 DIAGNOSIS — I15.9 SECONDARY HYPERTENSION: Primary | ICD-10-CM

## 2025-01-31 DIAGNOSIS — E78.2 MIXED HYPERLIPIDEMIA: ICD-10-CM

## 2025-01-31 DIAGNOSIS — M06.9 RHEUMATOID ARTHRITIS, INVOLVING UNSPECIFIED SITE, UNSPECIFIED WHETHER RHEUMATOID FACTOR PRESENT: ICD-10-CM

## 2025-01-31 NOTE — OUTREACH NOTE
AMBULATORY CASE MANAGEMENT NOTE    Names and Relationships of Patient/Support Persons:  -     [facility] End of Month Documentation    This Chronic Medical Management Care Plan for Kavita Garcia, 64 y.o. female, has been monitored and managed and a new plan of care implemented for the month of January.  A cumulative time of 71  minutes was spent on this patient record this month, including phone call with patient; electronic communication with primary care provider; chart review.    Regarding the patient's problems: has Rheumatoid arthritis; Chronic pain; Degeneration of intervertebral disc of lumbar region; Degeneration of intervertebral disc of thoracic region; Depression; Fibromyositis; Hyperlipidemia; Hypertension; Hypothyroidism; Other long term (current) drug therapy; Postlaminectomy syndrome, not elsewhere classified; Breakdown (mechanical) of int fix of vertebrae, init; Cervical spinal stenosis; Lumbar stenosis; Closed wedge compression fracture of T10 vertebra; Displacement of internal fixation device of vertebrae; Kyphosis of thoracolumbar region; Lumbar radiculopathy; Lumbar scoliosis; Neuropathy; Osteoporosis; Ankylosis of spine; Pseudarthrosis following spinal fusion; Pseudoarthrosis of spine; S/P lumbar fusion; Acquired postural kyphosis; Secondary kyphosis deformity of spine; Status post left hip replacement; Tobacco use; DDD (degenerative disc disease); Anxiety and depression; Nausea and vomiting, unspecified vomiting type; History of diverticulitis; and Allergic rhinitis on their problem list., the following items were addressed: medical records; medications; changes to medical care and any changes can be found within the plan section of the note.  A detailed listing of time spent for chronic care management is tracked within each outreach encounter.  Current medications include:  has a current medication list which includes the following prescription(s): atorvastatin, buspirone, cetirizine,  cholecalciferol, cyclobenzaprine, desvenlafaxine, fluticasone, gabapentin, levothyroxine, lisinopril, naloxone, ondansetron odt, oxycodone-acetaminophen, trazodone, [START ON 2/4/2025] varenicline, and varenicline tartrate (starter). and the patient is reported to be patient is noncompliant with medication protocol,  Medications are reported to be non-effective in controlling symptoms and changes have been made to the medication protocol, BP is still elevated at times.  Regarding these diagnoses, referrals were made to the following provider(s):  Gastroenterology, Rheumatology, Behavioral Health.  All notes on chart for PCP to review.    The patient was monitored remotely for blood pressure; activity level; pain; medications; mood & behavior.    The patient's physical needs include:  help taking medications as prescribed; physical healthcare; medication education; physician referral; resources for disability needs, Rheumatology(appt 2/11), DEXA scan, Colonoscopy, Pain Management(sees Palliative Care through [facility]), (appt 1/29).     The patient's mental support needs include:  increased support; coordination of community providers,  appt 1/29, patient is experiencing increased anxiety and depression by her admission    The patient's cognitive support needs include:  medication; increased support; coordination of community providers; emotional care; health care; household care    The patient's psychosocial support needs include:  coordination of community providers; medication management or adherence; need for increased support; no access to community activities; no opportunity for leisure activities    The patient's functional needs include: Rheumatology, DEXA scan, US, Colonoscopy,     The patient's environmental needs include:  resources for disability needs    Care Plan overall comments:  Ongoing    Refer to previous outreach notes for more information on the areas listed above.    Monthly Billing  Diagnoses  (I15.9) Secondary hypertension    (M06.9) Rheumatoid arthritis, involving unspecified site, unspecified whether rheumatoid factor present    (E78.2) Mixed hyperlipidemia    Medications   Medications have been reconciled    Care Plan progress this month:      Recently Modified Care Plans Updates made since 12/31/2024 12:00 AM      No recently modified care plans.               Current Specialty Plan of Care Status signed by both patient and provider    Instructions   Patient was provided an electronic copy of care plan  CCM services were explained and offered and patient has accepted these services.  Patient has given their written consent to receive CCM services and understands that this includes the authorization of electronic communication of medical information with the other treating providers.  Patient understands that they may stop CCM services at any time and these changes will be effective at the end of the calendar month and will effectively revocate the agreement of CCM services.  Patient understands that only one practitioner can furnish and be paid for CCM services during one calendar month.  Patient also understands that there may be co-payment or deductible fees in association with CCM services.  Patient will continue with at least monthly follow-up calls with the Ambulatory .    Saima VOGEL  Ambulatory Case Management    1/31/2025, 11:05 EST    Education Documentation  No documentation found.        Saima VOGEL  Ambulatory Case Management    1/31/2025, 11:05 EST

## 2025-02-04 ENCOUNTER — HOSPITAL ENCOUNTER (OUTPATIENT)
Dept: GENERAL RADIOLOGY | Facility: HOSPITAL | Age: 65
Discharge: HOME | End: 2025-02-04
Admitting: NURSE PRACTITIONER
Payer: MEDICARE

## 2025-02-04 ENCOUNTER — PATIENT OUTREACH (OUTPATIENT)
Dept: CASE MANAGEMENT | Facility: OTHER | Age: 65
End: 2025-02-04
Payer: MEDICARE

## 2025-02-04 DIAGNOSIS — M79.644 PAIN OF FINGER OF RIGHT HAND: ICD-10-CM

## 2025-02-04 DIAGNOSIS — E03.9 ACQUIRED HYPOTHYROIDISM: ICD-10-CM

## 2025-02-04 DIAGNOSIS — E03.9 ACQUIRED HYPOTHYROIDISM: Primary | ICD-10-CM

## 2025-02-04 DIAGNOSIS — M06.9 RHEUMATOID ARTHRITIS, INVOLVING UNSPECIFIED SITE, UNSPECIFIED WHETHER RHEUMATOID FACTOR PRESENT: ICD-10-CM

## 2025-02-04 PROCEDURE — 73140 X-RAY EXAM OF FINGER(S): CPT

## 2025-02-04 RX ORDER — LEVOTHYROXINE SODIUM 150 UG/1
150 TABLET ORAL DAILY
Qty: 90 TABLET | Refills: 1 | Status: SHIPPED | OUTPATIENT
Start: 2025-02-04

## 2025-02-04 NOTE — OUTREACH NOTE
AMBULATORY CASE MANAGEMENT NOTE    Names and Relationships of Patient/Support Persons: Contact: Kavita Garcia; Relationship: Self  Contact: [facility], [place]. - [address] [phone] [facility] [phone] FX; Relationship: Pharmacy -     [facility] Interim Update    The patient called requesting a refill on her thyroid medication to be sent to [facility]. She also states she never received her trazodone.    Care Coordination    I sent a refill request to PCP for sythroid.    I called [facility] and spoke with willam Morgan tech, regarding the trazodone. She stated that it was sent to patient but it was returned by [employer] to [facility] because they were unable to deliver it. I verified the address with [facility]. It is the address they have on file and have also sent other medications to the patient last month. She states she will send it out to the patient again.    I called patient back to inform her of the above. She verbalized understanding.      Saima VOGEL  Ambulatory Case Management    2/4/2025, 12:05 EST   Nutrition Care Plan    Nutrition Diagnosis:   Severe Protein Calorie Malnutrition related to poor appetite and intakes since 2019 (lots of GI issues on/off N/V & s/p Maru 19) and pt being afraid to eat per pt's girlfriend and family & current AMS with ICH as evidenced by weight loss of 12% (9.9 kg) past 4 months and weight loss of 9% (6.9 kg) past 2 months, eating < 75% of estimated energy needs for two months and mild muscle loss temples and currently NPO with TF ordered.    Altered nutrition-related laboratory values  related to unknown etiology, likely multi-factorial as evidenced by Na 157 this am.     Intervention:  Other:   NPO.  Will follow SLP recommendations and ability to advance po diet.      Enteral formula/solution:  Enteral Nutrition Formula: Renal Formula  Current Rate: 40 ml/hr  Access Site: NG/OG  Calories Provided by Tube Feedin kcal  Protein Provided by Tube Feeding  Free Water Provided by Tube Feedin ml  Other Provided by Tube Feedin g fiber  Goal Rate: 40 ml/hr     TF a goal meeting ~ 100% of low end estimated needs.    Feeding tube flush:  flushes increased to 200 ml every 4 hrs = 1200 ml. TF at goal + flushes = 1901 ml.    IV fluids:    D5 W at 100 ml/hr = 2400 ml, 408 kcal as dextrose.  Defer IVF and water flush adjustments to MD.    Monitoring and Evaluation:  Enteral formula/solution:   Goal: Pt to tolerate prescribed TF.  Goal: Pt to receive >/=80% of goal volume. Progressing - received 28% of goal volume past 24 hrs per I/O's. TF running at goal rate now.    Sodium:   Goal: Sodium WNL.

## 2025-02-06 ENCOUNTER — TELEPHONE (OUTPATIENT)
Dept: INTERNAL MEDICINE | Facility: CLINIC | Age: 65
End: 2025-02-06
Payer: MEDICARE

## 2025-02-06 DIAGNOSIS — M20.011 MALLET FINGER OF RIGHT HAND: Primary | ICD-10-CM

## 2025-02-17 ENCOUNTER — OFFICE VISIT (OUTPATIENT)
Dept: ORTHOPEDIC SURGERY | Facility: CLINIC | Age: 65
End: 2025-02-17
Payer: MEDICARE

## 2025-02-17 VITALS
WEIGHT: 124 LBS | SYSTOLIC BLOOD PRESSURE: 132 MMHG | OXYGEN SATURATION: 93 % | BODY MASS INDEX: 19.93 KG/M2 | DIASTOLIC BLOOD PRESSURE: 73 MMHG | HEIGHT: 66 IN | HEART RATE: 82 BPM

## 2025-02-17 DIAGNOSIS — M20.011 MALLET FINGER OF RIGHT HAND: Primary | ICD-10-CM

## 2025-02-17 NOTE — PROGRESS NOTES
"Chief Complaint  Pain and Initial Evaluation of the Right Hand    Subjective          Kavita Garcia presents to [facility] for an evaluation  of right hand.     History of Present Illness    The patient presents here today for an evaluation  of her right hand. She was wiping a mat when she noticed a mallet finger to her right middle finger. She states the injury happened several months ago. She denies any prior surgery to her right hand. She recently went to her family doctor where she had x-rays done on her right hand.     Allergies   Allergen Reactions    Duloxetine Hcl Itching    Erythromycin Hives    Morphine Hives    Sulfa Antibiotics Hives    Melatonin Unknown - Low Severity        Social History     Socioeconomic History    Marital status:    Tobacco Use    Smoking status: Every Day     Current packs/day: 0.00     Average packs/day: 0.5 packs/day for 25.0 years (12.5 ttl pk-yrs)     Types: Cigarettes     Start date: 1985     Last attempt to quit: 2010     Years since quitting: 15.1    Smokeless tobacco: Never   Vaping Use    Vaping status: Never Used   Substance and Sexual Activity    Alcohol use: Never    Drug use: Not Currently     Comment: Marijuana ---- occasionally    Sexual activity: Not Currently        I reviewed the patient's chief complaint, history of present illness, review of systems, past medical history, surgical history, family history, social history, medications, and allergy list.     REVIEW OF SYSTEMS    Constitutional: Denies fevers, chills, weight loss  Cardiovascular: Denies chest pain, shortness of breath  Skin: Denies rashes, acute skin changes  Neurologic: Denies headache, loss of consciousness  MSK: right hand pain       Objective   Vital Signs:   /73   Pulse 82   Ht 166.4 cm (65.5\")   Wt 56.2 kg (124 lb)   SpO2 93%   BMI 20.32 kg/m²     Body mass index is 20.32 kg/m².    Physical Exam    General: Alert. No acute distress.   Right " upper extremity: Mallet deformity to the right third finger with approximately  45 degrees flexion at rest, full passive extension, neurovascularly intact, sensation intact to the medial, radial and ulnar nerve, positive  pulses, no wounds     Orthopedic Injury Treatment    Date/Time: 2/17/2025 11:15 AM    Performed by: Cecilia Frye PCT  Authorized by: Donaldo Schmidt MD  Injury location: finger  Location details: right long finger  Pre-procedure neurovascular assessment: neurovascularly intact    Anesthesia:  Local anesthesia used: no    Sedation:  Patient sedated: no    Immobilization: splint  Supplies used: stack splint.  Post-procedure neurovascular assessment: post-procedure neurovascularly intact  Patient tolerance: patient tolerated the procedure well with no immediate complications      Imaging Results (Most Recent)       None                     Assessment and Plan        XR Finger 2+ View Right    Result Date: 2/6/2025  Narrative: XR FINGER 2+ VW RIGHT Date of Exam: 2/4/2025 12:18 PM EST Indication: Right finger pain, possible dislocation. Comparison: None available. Findings: There is a slight mallet finger deformity of the affected digit which appears to be the third digit on the lateral view. There is no visible avulsion fracture. There is no dislocation.     Impression: Impression: Slight mallet finger deformity of the third digit. No visible avulsion fracture or dislocation. Electronically Signed: Delonte Up  2/6/2025 10:09 AM EST  Workstation ID: [id number]      Diagnoses and all orders for this visit:    1. Mallet finger of right hand (Primary)        The patient presents here today for an evaluation  of her right hand.     She will be placed into a stax splint today and advised to wear this 24/7. If she removes the splint advised to leave the finger straight at all times. She will continue current medications for pain control.     Educated on risk of smoking/nicotine. Discussed options for  smoking cessation regarding chantix, nicorette gum and/ or to call the quit hotline at [phone]  and Call or return if worsening symptoms.    Scribed for Donaldo Schmidt MD by Alisson Pedraza  02/17/2025   10:55 EST         Follow Up     4 weeks     Patient was given instructions and counseling regarding her condition or for health maintenance advice. Please see specific information pulled into the AVS if appropriate.       I have personally performed the services described in this document as scribed by the above individual and it is both accurate and complete. Donaldo Schmidt MD 02/17/25 11:49 EST

## 2025-02-18 ENCOUNTER — APPOINTMENT (OUTPATIENT)
Dept: GENERAL RADIOLOGY | Facility: HOSPITAL | Age: 65
End: 2025-02-18
Payer: MEDICARE

## 2025-02-18 ENCOUNTER — HOSPITAL ENCOUNTER (EMERGENCY)
Facility: HOSPITAL | Age: 65
Discharge: HOME | End: 2025-02-18
Attending: EMERGENCY MEDICINE | Admitting: EMERGENCY MEDICINE
Payer: MEDICARE

## 2025-02-18 VITALS
BODY MASS INDEX: 19.93 KG/M2 | TEMPERATURE: 98.4 F | DIASTOLIC BLOOD PRESSURE: 65 MMHG | HEART RATE: 91 BPM | HEIGHT: 66 IN | OXYGEN SATURATION: 96 % | SYSTOLIC BLOOD PRESSURE: 127 MMHG | WEIGHT: 124 LBS | RESPIRATION RATE: 16 BRPM

## 2025-02-18 DIAGNOSIS — G89.29 CHRONIC THORACIC BACK PAIN, UNSPECIFIED BACK PAIN LATERALITY: Primary | ICD-10-CM

## 2025-02-18 DIAGNOSIS — G89.29 CHRONIC LEFT SHOULDER PAIN: ICD-10-CM

## 2025-02-18 DIAGNOSIS — M25.512 CHRONIC LEFT SHOULDER PAIN: ICD-10-CM

## 2025-02-18 DIAGNOSIS — M54.6 CHRONIC THORACIC BACK PAIN, UNSPECIFIED BACK PAIN LATERALITY: Primary | ICD-10-CM

## 2025-02-18 PROCEDURE — 63710000001 ONDANSETRON ODT 4 MG TABLET DISPERSIBLE: Performed by: EMERGENCY MEDICINE

## 2025-02-18 PROCEDURE — 73030 X-RAY EXAM OF SHOULDER: CPT

## 2025-02-18 PROCEDURE — 99283 EMERGENCY DEPT VISIT LOW MDM: CPT

## 2025-02-18 PROCEDURE — 72072 X-RAY EXAM THORAC SPINE 3VWS: CPT

## 2025-02-18 PROCEDURE — 71101 X-RAY EXAM UNILAT RIBS/CHEST: CPT

## 2025-02-18 RX ORDER — ONDANSETRON 4 MG/1
4 TABLET, ORALLY DISINTEGRATING ORAL ONCE
Status: COMPLETED | OUTPATIENT
Start: 2025-02-18 | End: 2025-02-18

## 2025-02-18 RX ORDER — OXYCODONE AND ACETAMINOPHEN 5; 325 MG/1; MG/1
2 TABLET ORAL ONCE
Status: COMPLETED | OUTPATIENT
Start: 2025-02-18 | End: 2025-02-18

## 2025-02-18 RX ADMIN — ONDANSETRON 4 MG: 4 TABLET, ORALLY DISINTEGRATING ORAL at 11:37

## 2025-02-18 RX ADMIN — OXYCODONE HYDROCHLORIDE AND ACETAMINOPHEN 2 TABLET: 5; 325 TABLET ORAL at 11:37

## 2025-02-18 NOTE — ED PROVIDER NOTES
Time: 1:41 PM EST  Date of encounter:  2/18/2025  Independent Historian/Clinical History and Information was obtained by:   Patient    History is limited by: N/A    Chief Complaint: Left shoulder pain      History of Present Illness:  Patient is a 64 y.o. year old female who presents to the emergency department for evaluation of chronic left shoulder pain and mid back pain that has been present for years but worse in the last several weeks.  Patient states she sees pain management for the pain and has a follow-up appointment soon regarding medication management.  Patient denies any new injury/trauma.  Patient denies chest pain and shortness of breath.      Patient Care Team  Primary Care Provider: Georgette Schwartz APRN    Past Medical History:     Allergies   Allergen Reactions    Duloxetine Hcl Itching    Erythromycin Hives    Morphine Hives    Sulfa Antibiotics Hives    Melatonin Unknown - Low Severity     Past Medical History:   Diagnosis Date    Abdominal pain 08/09/2015    Arthritis     Colon perforation 2012    Depression     GERD     Hypokalemia 02/05/2024    Hypothyroidism     Ileus, postoperative 08/13/2015    Neuropathy     Osteoporosis     Rheumatoid aortitis     Right ureteral stone 06/30/2024    Scoliosis 2010    Stomach ulcer 2012     Past Surgical History:   Procedure Laterality Date    BACK SURGERY  [id number]    6 surgeries    COLON SURGERY      Colon Resection    COLONOSCOPY      CYSTOSCOPY, URETEROSCOPY, RETROGRADE PYELOGRAM, STENT INSERTION Right 07/01/2024    Procedure: CYSTOSCOPY URETEROSCOPY RETROGRADE PYELOGRAM HOLMIUM LASER STENT INSERTION, right;  Surgeon: Ruma La MD;  Location: [facility];  Service: Urology;  Laterality: Right;    HIP FRACTURE SURGERY Bilateral     LEG SURGERY Right      Family History   Problem Relation Age of Onset    Arthritis Mother     Cancer Mother 58        Colon Cancer    Osteoporosis Mother     Hypertension Mother     Stroke Mother      Hyperlipidemia Mother     Thyroid disease Mother     Liver disease Father     Alcohol abuse Father     Thyroid disease Sister     Cancer Maternal Aunt         lung       Home Medications:  Prior to Admission medications    Medication Sig Start Date End Date Taking? Authorizing Provider   atorvastatin (Lipitor) 40 MG tablet Take 1 tablet by mouth Daily. 8/27/24   Georgette Schwartz APRN   busPIRone (BUSPAR) 5 MG tablet Take 1 tablet by mouth 3 (Three) Times a Day. 1/7/25   Georgette Schwartz APRN   cetirizine (zyrTEC) 10 MG tablet Take 1 tablet by mouth Daily. 8/22/24   Georgette Schwartz APRN   Cholecalciferol 10 MCG (400 UNIT) tablet     ProviderThomas MD   cyclobenzaprine (FLEXERIL) 5 MG tablet Take 1 tablet by mouth 2 (Two) Times a Day As Needed for Muscle Spasms. 12/3/24   Thomas Pierre MD   desvenlafaxine (Pristiq) 100 MG 24 hr tablet Take 1 tablet by mouth Daily. 10/2/24   Georgette Schwartz APRN   fluticasone (FLONASE) 50 MCG/ACT nasal spray 2 sprays into the nostril(s) as directed by provider Daily. 8/22/24   Georgette Schwartz APRN   gabapentin (NEURONTIN) 300 MG capsule Take 1 capsule by mouth 4 (Four) Times a Day. Palliative care manages 7/6/24   ProviderThomas MD   levothyroxine (SYNTHROID, LEVOTHROID) 150 MCG tablet Take 1 tablet by mouth Daily. 2/4/25   Georgette Schwartz APRN   lisinopril (PRINIVIL,ZESTRIL) 5 MG tablet Take 1 tablet by mouth Daily. 10/31/24   Georgette Schwartz APRN   naloxone (NARCAN) 4 MG/0.1ML nasal spray Call 911. Don't prime. [place] in 1 nostril for overdose. Repeat in 2-3 minutes in other nostril if no or minimal breathing/responsiveness. 7/1/24   Ruma La MD   ondansetron ODT (ZOFRAN-ODT) 4 MG disintegrating tablet Place 1 tablet on the tongue Every 8 (Eight) Hours As Needed for Nausea or Vomiting. 1/22/25   Georgette Schwartz APRN   oxyCODONE-acetaminophen (PERCOCET)  MG per tablet Take 1 tablet by mouth Every 4  "(Four) Hours As Needed. for pain    Provider, MD Thomas   traZODone (DESYREL) 50 MG tablet Take 1 tablet by mouth Every Night. 1/7/25   Georgette Schwartz APRN   varenicline (Chantix Continuing Month Pak) 1 MG tablet Take 1 tablet by mouth 2 (Two) Times a Day for 56 days. 2/4/25 4/1/25  Georgette Schwartz APRN        Social History:   Social History     Tobacco Use    Smoking status: Every Day     Current packs/day: 0.00     Average packs/day: 0.5 packs/day for 25.0 years (12.5 ttl pk-yrs)     Types: Cigarettes     Start date: 1985     Last attempt to quit: 2010     Years since quitting: 15.1    Smokeless tobacco: Never   Vaping Use    Vaping status: Never Used   Substance Use Topics    Alcohol use: Never    Drug use: Not Currently     Comment: Marijuana ---- occasionally         Review of Systems:  Review of Systems   Constitutional:  Negative for chills and fever.   HENT:  Negative for congestion, rhinorrhea and sore throat.    Eyes:  Negative for pain and visual disturbance.   Respiratory:  Negative for apnea, cough, chest tightness and shortness of breath.    Cardiovascular:  Negative for chest pain and palpitations.   Gastrointestinal:  Negative for abdominal pain, diarrhea, nausea and vomiting.   Genitourinary:  Negative for difficulty urinating and dysuria.   Musculoskeletal:  Positive for arthralgias. Negative for joint swelling and myalgias.   Skin:  Negative for color change.   Neurological:  Negative for seizures and headaches.   Psychiatric/Behavioral: Negative.     All other systems reviewed and are negative.       Physical Exam:  /65 (BP Location: Right arm, Patient Position: Sitting)   Pulse 91   Temp 98.4 °F (36.9 °C) (Oral)   Resp 16   Ht 166.4 cm (65.5\")   Wt 56.2 kg (124 lb)   SpO2 96%   BMI 20.32 kg/m²     Physical Exam  Vitals and nursing note reviewed.   Constitutional:       General: She is not in acute distress.     Appearance: Normal appearance. She is not " toxic-appearing.   HENT:      Head: Normocephalic and atraumatic.      Jaw: There is normal jaw occlusion.   Eyes:      General: Lids are normal.      Extraocular Movements: Extraocular movements intact.      Conjunctiva/sclera: Conjunctivae normal.      Pupils: Pupils are equal, round, and reactive to light.   Cardiovascular:      Rate and Rhythm: Normal rate and regular rhythm.      Pulses: Normal pulses.      Heart sounds: Normal heart sounds.   Pulmonary:      Effort: Pulmonary effort is normal. No respiratory distress.      Breath sounds: Normal breath sounds. No wheezing or rhonchi.   Abdominal:      General: Abdomen is flat.      Palpations: Abdomen is soft.      Tenderness: There is no abdominal tenderness. There is no guarding or rebound.   Musculoskeletal:         General: Tenderness (Mild appreciated palpation to left shoulder and thoracic back) present. Normal range of motion.      Cervical back: Normal range of motion and neck supple.      Right lower leg: No edema.      Left lower leg: No edema.   Skin:     General: Skin is warm and dry.   Neurological:      Mental Status: She is alert and oriented to person, place, and time. Mental status is at baseline.   Psychiatric:         Mood and Affect: Mood normal.                    Medical Decision Making:      Comorbidities that affect care:    Thyroid disease    External Notes reviewed:          The following orders were placed and all results were independently analyzed by me:  Orders Placed This Encounter   Procedures    XR Shoulder 2+ View Left    XR Ribs Left With PA Chest    XR Spine Thoracic 3 View       Medications Given in the Emergency Department:  Medications   oxyCODONE-acetaminophen (PERCOCET) 5-325 MG per tablet 2 tablet (2 tablets Oral Given 2/18/25 1137)   ondansetron ODT (ZOFRAN-ODT) disintegrating tablet 4 mg (4 mg Oral Given 2/18/25 1137)        ED Course:         Labs:    Lab Results (last 24 hours)       ** No results found for the last  24 hours. **             Imaging:    XR Spine Thoracic 3 View    Result Date: 2/18/2025  XR SPINE THORACIC 3 VW Date of Exam: 2/18/2025 12:22 PM EST Indication: pain Comparison: None available. Findings: Status post thoracolumbar fusion. Chronic compression deformity of the mid to lower thoracic spine is unchanged compared to MRI from 2018. No evidence of hardware failure or fracture acutely is identified. Adjacent soft tissues are unremarkable.     Impression: 1.Status post thoracolumbar fusion. 2.Chronic compression deformity of the mid to lower thoracic spine. Electronically Signed: Karthik Byrd MD  2/18/2025 1:29 PM EST  Workstation ID: [id number]    XR Shoulder 2+ View Left    Result Date: 2/18/2025  XR RIBS LEFT W PA CHEST, XR SHOULDER 2+ VW LEFT Date of Exam: 2/18/2025 12:22 PM EST Indication: pain Comparison: Chest x-ray February 16, 2024 Findings: Chest x-ray/left rib cage pedicle screws bridged by rods are seen along the thoracolumbar area. The patient is rotated. The heart is not definitely enlarged. There is some bibasilar atelectasis. There is a granuloma left lower chest. On evaluation of the  left rib cage, there is an old rib fracture deformity involving the posterolateral aspect of the left fifth rib. Left shoulder: There is no dislocation. An acute osseous abnormality is not definitely identified. The acromioclavicular joint is not optimally in profile which could be positional.     Impression: 1.Bibasilar atelectasis. 2.Old rib fracture deformity left fifth rib. 3.No definite acute osseous abnormality left shoulder. Acromioclavicular joint is not optimally in profile which could be positional. 4.Evidence for prior granulomatous exposure. Electronically Signed: Catarino Akins MD  2/18/2025 1:01 PM EST  Workstation ID: [id number]    XR Ribs Left With PA Chest    Result Date: 2/18/2025  XR RIBS LEFT W PA CHEST, XR SHOULDER 2+ VW LEFT Date of Exam: 2/18/2025 12:22 PM EST Indication: pain Comparison: Chest  x-ray February 16, 2024 Findings: Chest x-ray/left rib cage pedicle screws bridged by rods are seen along the thoracolumbar area. The patient is rotated. The heart is not definitely enlarged. There is some bibasilar atelectasis. There is a granuloma left lower chest. On evaluation of the  left rib cage, there is an old rib fracture deformity involving the posterolateral aspect of the left fifth rib. Left shoulder: There is no dislocation. An acute osseous abnormality is not definitely identified. The acromioclavicular joint is not optimally in profile which could be positional.     Impression: 1.Bibasilar atelectasis. 2.Old rib fracture deformity left fifth rib. 3.No definite acute osseous abnormality left shoulder. Acromioclavicular joint is not optimally in profile which could be positional. 4.Evidence for prior granulomatous exposure. Electronically Signed: Catarino Akins MD  2/18/2025 1:01 PM EST  Workstation ID: [id number]       Differential Diagnosis and Discussion:    Extremity Pain: Differential diagnosis includes but is not limited to soft tissue sprain, tendonitis, tendon injury, dislocation, fracture, deep vein thrombosis, arterial insufficiency, osteoarthritis, bursitis, and ligamentous damage.    PROCEDURES:    X-ray were performed in the emergency department and all X-ray impressions were independently interpreted by me.    No orders to display       Procedures    MDM     X-ray left shoulder shows No definite acute osseous abnormality left shoulder.  X-ray of the left ribs with PA chest shows Old rib fracture deformity left fifth rib.  X-ray thoracic spine shows Status post thoracolumbar fusion.  2.Chronic compression deformity of the mid to lower thoracic spine.  Patient denies urinary/bowel incontinence.  Patient is resting comfortably this time.  Instructed patient to follow-up with orthopedic surgery, neurosurgery, and pain management.  I instructed patient to return to ED if she develops any new or  worsening symptoms.  Patient states she understands and agrees with plan of care.                   Patient Care Considerations:          Consultants/Shared Management Plan:    None    Social Determinants of Health:    Patient is independent, reliable, and has access to care.       Disposition and Care Coordination:    Discharged: The patient is suitable and stable for discharge with no need for consideration of admission.    I have explained the patient´s condition, diagnoses and treatment plan based on the information available to me at this time. I have answered questions and addressed any concerns. The patient has a good  understanding of the patient´s diagnosis, condition, and treatment plan as can be expected at this point. The vital signs have been stable. The patient´s condition is stable and appropriate for discharge from the emergency department.      The patient will pursue further outpatient evaluation with the primary care physician or other designated or consulting physician as outlined in the discharge instructions. They are agreeable to this plan of care and follow-up instructions have been explained in detail. The patient has received these instructions in written format and has expressed an understanding of the discharge instructions. The patient is aware that any significant change in condition or worsening of symptoms should prompt an immediate return to this or the closest emergency department or call to 911.  I have explained discharge medications and the need for follow up with the patient/caretakers. This was also printed in the discharge instructions. Patient was discharged with the following medications and follow up:      Medication List      No changes were made to your prescriptions during this visit.      Georgette Schwartz, APRN  [address]  [place] [address]  [phone]    Call in 1 day  To schedule follow-up       Final diagnoses:   Chronic thoracic back pain,  unspecified back pain laterality   Chronic left shoulder pain        ED Disposition       ED Disposition   Discharge    Condition   Stable    Comment   --               This medical record created using voice recognition software.             Karthik Luna PA-C  02/18/25 [id number]

## 2025-02-19 ENCOUNTER — TELEPHONE (OUTPATIENT)
Dept: GASTROENTEROLOGY | Facility: CLINIC | Age: 65
End: 2025-02-19
Payer: MEDICARE

## 2025-02-19 ENCOUNTER — TELEPHONE (OUTPATIENT)
Dept: INTERNAL MEDICINE | Facility: CLINIC | Age: 65
End: 2025-02-19
Payer: MEDICARE

## 2025-02-19 NOTE — TELEPHONE ENCOUNTER
Pt had 11 am phone appt to schedule a screening colonoscopy. I left vm to call office if she wishes to proceed.  jazzy

## 2025-02-19 NOTE — TELEPHONE ENCOUNTER
Called Ms Cutler back to let her know that as of today we hadn't received anything.  States she was going to call them to double check fax number.

## 2025-02-19 NOTE — TELEPHONE ENCOUNTER
Caller: hanny salazar    Relationship: Emergency Contact    Best call back number: [phone]    What is the best time to reach you: ANY    Who are you requesting to speak with (clinical staff, provider,  specific staff member): CLINICAL    What was the call regarding: WANTS TO MAKE SURE OFFICE RECEIVED FMLA PAPERWORK

## 2025-02-24 RX ORDER — ATORVASTATIN CALCIUM 40 MG/1
40 TABLET, FILM COATED ORAL DAILY
Qty: 90 TABLET | Refills: 1 | Status: SHIPPED | OUTPATIENT
Start: 2025-02-24

## 2025-02-24 NOTE — PROGRESS NOTES
Chief Complaint  ER Follow-up (ED with Timi Schrader MD (02/18/2025)/Final diagnoses:/Chronic thoracic back pain, unspecified back pain laterality/Chronic left shoulder pain/ /), Anxiety (Patient reports her nerves are bad and the Buspar is not helping her. ), and Nicotine Dependence (Patient has not been able to take the Chantix due to causing her nausea. )    Subjective          Kavita Garcia presents to [facility]  History of Present Illness    ED with Timi Schradre MD (02/18/2025)     Final diagnoses:   Chronic thoracic back pain, unspecified back pain laterality   Chronic left shoulder pain      History of Present Illness    Nicotine Dependence  Presents for follow up visit. Symptoms include cravings, decreased concentration and irritability. Symptoms are negative for fatigue, headache, insomnia and sore throat. Preferred tobacco types include cigarettes. Her urge triggers include stress. Number of cigarettes per day: 1/2 ppd. Past treatments include nothing. Kavita is ready to quit. There is no history of alcohol abuse and drug use.     Has not been able to take Chantix due to nausea       Hypertension  This is a chronic problem. Episode onset: has been off of medications x several years. Pertinent negatives include no anxiety, blurred vision, chest pain, headaches, malaise/fatigue, neck pain, orthopnea, palpitations, peripheral edema, PND, shortness of breath or sweats. Current antihypertension treatment includes nothing. There is no history of angina, kidney disease, CAD/MI, CVA, heart failure, left ventricular hypertrophy, PVD or retinopathy. There is no history of chronic renal disease, coarctation of the aorta, hyperaldosteronism, hypercortisolism, hyperparathyroidism, a hypertension causing med, pheochromocytoma, renovascular disease, sleep apnea or a thyroid problem.     Depression/Anxiety  Presents for f/u visit. Symptoms include decreased  concentration, depressed mood, excessive worry, feelings of hopelessness, insomnia, irritability, nervousness/anxiety, difficulty staying asleep and difficulty falling asleep. Patient is not experiencing: compulsions, confusion, dry mouth, feelings of worthlessness, hypersomnia, hyperventilation, impotence, muscle tension, obsessions, palpitations, panic, psychomotor agitation, psychomotor retardation, shortness of breath, suicidal ideas, suicidal planning, thoughts of death, weight gain, weight loss, chest pain, feeling of choking, dizziness, malaise/fatigue, nausea and difficulty controlling mood.  Her past medical history is significant for depression.  Risk factors: lost her , moved. Risk factors: lost her , moved.   Current Outpatient Medications   Medication Instructions    atorvastatin (LIPITOR) 40 mg, Oral, Daily    busPIRone (BUSPAR) 7.5 mg, Oral, 3 Times Daily    cetirizine (ZYRTEC) 10 mg, Oral, Daily    Cholecalciferol 10 MCG (400 UNIT) tablet Take 1 tablet by mouth Daily.    cyclobenzaprine (FLEXERIL) 5 mg, Oral, 2 Times Daily PRN    desvenlafaxine (PRISTIQ) 100 mg, Oral, Daily    fluticasone (FLONASE) 50 MCG/ACT nasal spray 2 sprays, Nasal, Daily    gabapentin (NEURONTIN) 300 MG capsule Take 1 capsule by mouth 4 (Four) Times a Day. Palliative care manages    levothyroxine (SYNTHROID, LEVOTHROID) 150 mcg, Oral, Daily    naloxone (NARCAN) 4 MG/0.1ML nasal spray Call 911. Don't prime. [place] in 1 nostril for overdose. Repeat in 2-3 minutes in other nostril if no or minimal breathing/responsiveness.    ondansetron ODT (ZOFRAN-ODT) 4 mg, Translingual, Every 8 Hours PRN    oxyCODONE-acetaminophen (PERCOCET)  MG per tablet 1 tablet, Every 4 Hours PRN    traZODone (DESYREL) 50 mg, Oral, Nightly       The following portions of the patient's history were reviewed and updated as appropriate: allergies, current medications, past family history, past medical history, past social history, past  "surgical history, and problem list.    Objective   Vital Signs:   BP 91/56 (BP Location: Right arm, Patient Position: Sitting, Cuff Size: Adult)   Pulse 76   Temp 97.1 °F (36.2 °C) (Temporal)   Ht 166.4 cm (65.5\")   Wt 59 kg (130 lb)   SpO2 92%   BMI 21.30 kg/m²     BP Readings from Last 3 Encounters:   02/28/25 91/56   02/18/25 127/65   02/17/25 132/73     Wt Readings from Last 3 Encounters:   02/28/25 59 kg (130 lb)   02/18/25 56.2 kg (124 lb)   02/17/25 56.2 kg (124 lb)     BMI is within normal parameters. No other follow-up for BMI required.     Physical Exam     Appearance: No acute distress, well-nourished  Head: normocephalic, atraumatic  Eyes: extraocular movements intact, no scleral icterus, no conjunctival injection  Ears, Nose, and Throat: external ears normal, nares patent, moist mucous membranes  Cardiovascular: regular rate and rhythm. no murmurs, rubs, or gallops. no edema  Respiratory: breathing comfortably, symmetric chest rise, clear to auscultation bilaterally. No wheezes, rales, or rhonchi.  Neuro: alert and oriented to time, place, and person. Normal gait  Psych: normal mood and affect     Physical Exam        Result Review :   The following data was reviewed by: LAMINE Weiss on 02/28/2025:  Common labs          6/30/2024    13:57 8/21/2024    16:28 1/7/2025    14:21   Common Labs   Glucose 124  86  103    BUN 10  8  8    Creatinine 0.75  1.11  0.95    Sodium 137  138  137    Potassium 3.2  4.4  4.1    Chloride 100  100  101    Calcium 9.3  9.7  9.4    Albumin 4.1  4.3  4.2    Total Bilirubin 0.3  0.2  0.4    Alkaline Phosphatase 109  104  132    AST (SGOT) 18  21  21    ALT (SGPT) 20  16  13    WBC 5.79  7.82  7.28    Hemoglobin 13.1  13.4  13.9    Hematocrit 37.3  40.2  41.2    Platelets 274  235  247    Total Cholesterol  229  146    Triglycerides  243  144    HDL Cholesterol  43  54    LDL Cholesterol   142  67        Results           Lab Results   Component Value Date    " INR 1.0 08/14/2020    BILIRUBINUR Negative 06/30/2024            Assessment and Plan    Diagnoses and all orders for this visit:    1. JOSE A (generalized anxiety disorder) (Primary)  Comments:  increasing buspar to 7.5 mg TID  Orders:  -     busPIRone (BUSPAR) 7.5 MG tablet; Take 1 tablet by mouth 3 (Three) Times a Day.  Dispense: 90 tablet; Refill: 1    2. Hypertension, unspecified type    3. Nausea  Comments:  recurrent - has gastro appt scheduled.  Overview:  recurrent - has gastro appt scheduled.      4. Cigarette nicotine dependence with nicotine-induced disorder  Comments:  will stop chantix for now until nasuea gets resolved and we will revisit cessation  Overview:  will stop chantix for now until nasuea gets resolved and we will revisit cessation      5. Mild episode of recurrent major depressive disorder    6. Muscle spasm  -     cyclobenzaprine (FLEXERIL) 5 MG tablet; Take 1 tablet by mouth 2 (Two) Times a Day As Needed for Muscle Spasms.  Dispense: 90 tablet; Refill: 1      - discontinue lisinopril today   - continue ambulatory BP monitoring     Assessment & Plan      Medications Discontinued During This Encounter   Medication Reason    busPIRone (BUSPAR) 5 MG tablet     varenicline (Chantix Continuing Month Jose Raul) 1 MG tablet Historical Med - Therapy completed    lisinopril (PRINIVIL,ZESTRIL) 5 MG tablet Side effects    cyclobenzaprine (FLEXERIL) 5 MG tablet Reorder          Follow Up   Return in about 3 months (around 5/28/2025).  Patient was given instructions and counseling regarding her condition or for health maintenance advice. Please see specific information pulled into the AVS if appropriate.       LAMINE Weiss  03/12/25  08:03 EDT      Patient or patient representative verbalized consent for the use of Ambient Listening during the visit with  LAMINE Weiss for chart documentation. 3/12/2025  11:34 EST

## 2025-02-25 ENCOUNTER — TELEPHONE (OUTPATIENT)
Dept: CASE MANAGEMENT | Facility: OTHER | Age: 65
End: 2025-02-25
Payer: MEDICARE

## 2025-02-26 ENCOUNTER — TELEPHONE (OUTPATIENT)
Dept: CASE MANAGEMENT | Facility: OTHER | Age: 65
End: 2025-02-26
Payer: MEDICARE

## 2025-02-27 ENCOUNTER — PATIENT OUTREACH (OUTPATIENT)
Dept: CASE MANAGEMENT | Facility: OTHER | Age: 65
End: 2025-02-27
Payer: MEDICARE

## 2025-02-27 DIAGNOSIS — M06.9 RHEUMATOID ARTHRITIS, INVOLVING UNSPECIFIED SITE, UNSPECIFIED WHETHER RHEUMATOID FACTOR PRESENT: Primary | ICD-10-CM

## 2025-02-27 DIAGNOSIS — I15.9 SECONDARY HYPERTENSION: ICD-10-CM

## 2025-02-28 ENCOUNTER — OFFICE VISIT (OUTPATIENT)
Dept: INTERNAL MEDICINE | Facility: CLINIC | Age: 65
End: 2025-02-28
Payer: MEDICARE

## 2025-02-28 ENCOUNTER — PATIENT OUTREACH (OUTPATIENT)
Dept: CASE MANAGEMENT | Facility: OTHER | Age: 65
End: 2025-02-28
Payer: MEDICARE

## 2025-02-28 VITALS
SYSTOLIC BLOOD PRESSURE: 91 MMHG | BODY MASS INDEX: 20.89 KG/M2 | WEIGHT: 130 LBS | TEMPERATURE: 97.1 F | HEIGHT: 66 IN | DIASTOLIC BLOOD PRESSURE: 56 MMHG | HEART RATE: 76 BPM | OXYGEN SATURATION: 92 %

## 2025-02-28 DIAGNOSIS — F33.0 MILD EPISODE OF RECURRENT MAJOR DEPRESSIVE DISORDER: ICD-10-CM

## 2025-02-28 DIAGNOSIS — R11.0 NAUSEA: Chronic | ICD-10-CM

## 2025-02-28 DIAGNOSIS — I15.9 SECONDARY HYPERTENSION: ICD-10-CM

## 2025-02-28 DIAGNOSIS — M62.838 MUSCLE SPASM: ICD-10-CM

## 2025-02-28 DIAGNOSIS — M06.9 RHEUMATOID ARTHRITIS, INVOLVING UNSPECIFIED SITE, UNSPECIFIED WHETHER RHEUMATOID FACTOR PRESENT: Primary | ICD-10-CM

## 2025-02-28 DIAGNOSIS — F41.1 GAD (GENERALIZED ANXIETY DISORDER): Primary | Chronic | ICD-10-CM

## 2025-02-28 DIAGNOSIS — I10 HYPERTENSION, UNSPECIFIED TYPE: ICD-10-CM

## 2025-02-28 DIAGNOSIS — F17.219 CIGARETTE NICOTINE DEPENDENCE WITH NICOTINE-INDUCED DISORDER: Chronic | ICD-10-CM

## 2025-02-28 RX ORDER — BUSPIRONE HYDROCHLORIDE 7.5 MG/1
7.5 TABLET ORAL 3 TIMES DAILY
Qty: 90 TABLET | Refills: 1 | Status: SHIPPED | OUTPATIENT
Start: 2025-02-28

## 2025-02-28 NOTE — OUTREACH NOTE
CCM End of Month Documentation    This Chronic Medical Management Care Plan for Kavita Garcia, 64 y.o. female, has been monitored and managed; reviewed and a new plan of care implemented for the month of February.  A cumulative time of 44  minutes was spent on this patient record this month, including phone call with patient; electronic communication with primary care provider; chart review; phone call with pharmacist.    Regarding the patient's problems: has Rheumatoid arthritis; Chronic pain; Degeneration of intervertebral disc of lumbar region; Degeneration of intervertebral disc of thoracic region; Depression; Fibromyositis; Hyperlipidemia; Hypertension; Hypothyroidism; Other long term (current) drug therapy; Postlaminectomy syndrome, not elsewhere classified; Breakdown (mechanical) of int fix of vertebrae, init; Cervical spinal stenosis; Lumbar stenosis; Closed wedge compression fracture of T10 vertebra; Displacement of internal fixation device of vertebrae; Kyphosis of thoracolumbar region; Lumbar radiculopathy; Lumbar scoliosis; Neuropathy; Osteoporosis; Ankylosis of spine; Pseudarthrosis following spinal fusion; Pseudoarthrosis of spine; S/P lumbar fusion; Acquired postural kyphosis; Secondary kyphosis deformity of spine; Status post left hip replacement; Tobacco use; DDD (degenerative disc disease); Anxiety and depression; Nausea and vomiting, unspecified vomiting type; History of diverticulitis; Allergic rhinitis; and JOSE A (generalized anxiety disorder) on their problem list., the following items were addressed: medical records; medications and any changes can be found within the plan section of the note.  A detailed listing of time spent for chronic care management is tracked within each outreach encounter.  Current medications include:  has a current medication list which includes the following prescription(s): atorvastatin, buspirone, cetirizine, cholecalciferol, cyclobenzaprine, desvenlafaxine,  fluticasone, gabapentin, levothyroxine, lisinopril, naloxone, ondansetron odt, oxycodone-acetaminophen, trazodone, and varenicline. and the patient is reported to be patient is compliant with medication protocol,  Medications are reported to be effective.  Regarding these diagnoses, referrals were made to the following provider(s):  N/A.  All notes on chart for PCP to review.    The patient was monitored remotely for blood pressure; activity level; pain; medications; mood & behavior.    The patient's physical needs include:  help taking medications as prescribed; physical healthcare; medication education.     The patient's mental support needs include:  coordination of community providers; continued support    The patient's cognitive support needs include:  medication; coordination of community providers; emotional care; health care; household care; continued support    The patient's psychosocial support needs include:  coordination of community providers; medication management or adherence; no access to community activities; no opportunity for leisure activities; continued support    The patient's functional needs include: medication education; physical healthcare; hearing care    The patient's environmental needs include:  resources for disability needs    Care Plan overall comments:  Ongoing    Refer to previous outreach notes for more information on the areas listed above.    Monthly Billing Diagnoses  (M06.9) Rheumatoid arthritis, involving unspecified site, unspecified whether rheumatoid factor present    (I15.9) Secondary hypertension    Medications   Medications have been reconciled    Care Plan progress this month:      Recently Modified Care Plans Updates made since 1/28/2025 12:00 AM      No recently modified care plans.               Current Specialty Plan of Care Status signed by both patient and provider    Instructions   Patient was provided an electronic copy of care plan  CCM services were explained  and offered and patient has accepted these services.  Patient has given their written consent to receive CCM services and understands that this includes the authorization of electronic communication of medical information with the other treating providers.  Patient understands that they may stop CCM services at any time and these changes will be effective at the end of the calendar month and will effectively revocate the agreement of CCM services.  Patient understands that only one practitioner can furnish and be paid for CCM services during one calendar month.  Patient also understands that there may be co-payment or deductible fees in association with CCM services.  Patient will continue with at least monthly follow-up calls with the Ambulatory .    Saima VOGEL  Ambulatory Case Management    2/28/2025, 11:42 EST

## 2025-02-28 NOTE — OUTREACH NOTE
AMBULATORY CASE MANAGEMENT NOTE    Names and Relationships of Patient/Support Persons: Contact: Kavita Garcia; Relationship: Self -     I spoke with the patient and her daughter face to face while they were in the office today. The patient is having difficulty making all the specialty appointments she is in need of and keeping them all straight so she can attend them. She is also having difficulty with the transportation provided by her insurance company. She states they did not pick her up recently for an appointment, causing her to miss it.    I performed a chart review with the patient to determine which appointments she is still in need of. She needs the following: Pain Management with [facility], Audiology with Floyd Singh, [facility], Talk therapy appointment and appt for a DEXA scan.    I have told them that I will call and schedule all the appointments for her. The patient's daughter states she will take her to the appointments once they are scheduled. She needs the appointments to be late morning or early afternoon and not between 3/31-4/5, as they will be on a cruise.    Patient states she needs a refill for her flexerill sent to [facility].    Patient does not know how to access her MyChart. Front office staff has given her the number to call to get access again.    Care Coordination    I spoke with the PCP face to face. She is agreeable to send in the flexeril refill.    Saima VOGEL  Ambulatory Case Management    2/28/2025, 13:34 EST

## 2025-03-03 ENCOUNTER — PATIENT OUTREACH (OUTPATIENT)
Dept: CASE MANAGEMENT | Facility: OTHER | Age: 65
End: 2025-03-03
Payer: MEDICARE

## 2025-03-03 DIAGNOSIS — H93.13 TINNITUS OF BOTH EARS: ICD-10-CM

## 2025-03-03 DIAGNOSIS — I15.9 SECONDARY HYPERTENSION: ICD-10-CM

## 2025-03-03 DIAGNOSIS — H91.90 HEARING LOSS, UNSPECIFIED HEARING LOSS TYPE, UNSPECIFIED LATERALITY: ICD-10-CM

## 2025-03-03 DIAGNOSIS — M06.9 RHEUMATOID ARTHRITIS, INVOLVING UNSPECIFIED SITE, UNSPECIFIED WHETHER RHEUMATOID FACTOR PRESENT: Primary | ICD-10-CM

## 2025-03-03 NOTE — OUTREACH NOTE
AMBULATORY CASE MANAGEMENT NOTE    Names and Relationships of Patient/Support Persons: Contact: [facility]; Relationship: Other  Contact: Audiology-Floyd Singh; Relationship: Other  Contact: Elizabeth Wiggins-Psychiatry; Relationship: Other  Contact: [facility]; Relationship: Other  Contact: [facility] Scheduling; Relationship: Other  Contact: Dr. Emile Styles; Relationship: Other -     Care Coordination    I called the above places to schedule appointments for the patient. Marylou Velázquez needed to speak to the patient directly and stated they would call her.    Appointments were scheduled as follows:  Audiology 3/11 at 11:30am at [address]  [facility] (if on calcium, stop 48 hours prior)  Rheumatolgy Dr. Emile Styles 4/29 at 11:00am at [address] ([phone])    I called Elizabeth Wiggins's office to inquire about a talk therapy referral. I spoke with Pat and she stated that a referral was sent to [facility] on 1/29. I called [facility]. They stated that they do not have a referral on file and request for it to be sent again. The also stated there is a 2-3 month waitlist with them.  I called Pat with Elizabeth Wiggins's office and left her a message regarding this.    I called the patient and left a VM to call me back.    Saima VOGEL  Ambulatory Case Management    3/3/2025, 09:36 EST

## 2025-03-05 ENCOUNTER — PATIENT OUTREACH (OUTPATIENT)
Dept: CASE MANAGEMENT | Facility: OTHER | Age: 65
End: 2025-03-05
Payer: MEDICARE

## 2025-03-05 ENCOUNTER — TELEPHONE (OUTPATIENT)
Dept: PSYCHIATRY | Facility: CLINIC | Age: 65
End: 2025-03-05
Payer: MEDICARE

## 2025-03-05 DIAGNOSIS — I15.9 SECONDARY HYPERTENSION: ICD-10-CM

## 2025-03-05 DIAGNOSIS — F41.1 GAD (GENERALIZED ANXIETY DISORDER): ICD-10-CM

## 2025-03-05 DIAGNOSIS — F43.21 GRIEF REACTION: ICD-10-CM

## 2025-03-05 DIAGNOSIS — M06.9 RHEUMATOID ARTHRITIS, INVOLVING UNSPECIFIED SITE, UNSPECIFIED WHETHER RHEUMATOID FACTOR PRESENT: Primary | ICD-10-CM

## 2025-03-05 DIAGNOSIS — F32.1 CURRENT MODERATE EPISODE OF MAJOR DEPRESSIVE DISORDER, UNSPECIFIED WHETHER RECURRENT: Primary | ICD-10-CM

## 2025-03-05 NOTE — TELEPHONE ENCOUNTER
Patient  from [facility] called states that [facility] has a 3 month wait, and are asking for a new referral for the virtual clinic, I'm closing out the [facility] referral, please advise on new referral.

## 2025-03-05 NOTE — OUTREACH NOTE
AMBULATORY CASE MANAGEMENT NOTE    Names and Relationships of Patient/Support Persons: Contact: Kavita Garcia; Relationship: Self -     CCM Interim Update    I spoke with the patient on the phone today. I went through all of the appointments she has scheduled. I have also encouraged her to call to gain access to her MyChart to make coordination easier for her.    Patient is very grateful for the care coordination.    Care Coordination    I rescheduled her appt with PCP because it conflicted with her pain management appointment.    I called Elizabeth Wiggins's office to let them know that the patient agrees to doing talk therapy via telehealth. They are going to place the referral for the  in [place] to schedule an appointment with the patient.    Saima VOGEL  Ambulatory Case Management    3/5/2025, 15:04 EST

## 2025-03-11 ENCOUNTER — PROCEDURE VISIT (OUTPATIENT)
Dept: OTOLARYNGOLOGY | Facility: CLINIC | Age: 65
End: 2025-03-11
Payer: MEDICARE

## 2025-03-11 DIAGNOSIS — D23.22 DERMOID CYST OF LEFT EAR: ICD-10-CM

## 2025-03-11 DIAGNOSIS — H90.A22 SENSORINEURAL HEARING LOSS (SNHL) OF LEFT EAR WITH RESTRICTED HEARING OF RIGHT EAR: ICD-10-CM

## 2025-03-11 DIAGNOSIS — H93.13 TINNITUS OF BOTH EARS: ICD-10-CM

## 2025-03-11 DIAGNOSIS — H90.A21 SENSORINEURAL HEARING LOSS (SNHL) OF RIGHT EAR WITH RESTRICTED HEARING OF LEFT EAR: Primary | ICD-10-CM

## 2025-03-11 PROCEDURE — 92567 TYMPANOMETRY: CPT | Performed by: AUDIOLOGIST

## 2025-03-11 PROCEDURE — 92557 COMPREHENSIVE HEARING TEST: CPT | Performed by: AUDIOLOGIST

## 2025-03-11 NOTE — PROGRESS NOTES
AUDIOMETRIC EVALUATION      Name:  Kavita Garcia  :  1960  Age:  64 y.o.  Date of Evaluation:  2025       History:  Mrs. Garcia is seen today for a hearing evaluation due to history of noise exposure.    Audiologic Information:  Concerns for Hearing: Yes  PETs: No  Other otologic surgical history: None  Aural Pressure/Fullness: Periodic  Otalgia: No  Otorrhea: No  Tinnitus: Periodic  Dizziness: No  Noise Exposure: Yes  Family history of hearing loss: No  Head trauma requiring hospital stay: None  Chemotherapy: No  Other significant history: No    EVALUATION:    See audiogram    RESULTS:    Otoscopic Evaluation:  Right: Unremarkable  Left: Unremarkable       Tympanometry (226 Hz):  Right: Type A  Left: Type A    IMPRESSIONS:  Pure tone thresholds for the right ear indicates a moderate high-frequency sensorineural hearing loss at 4K and 8K hertz.  Pure tone thresholds for the left ear indicates a moderate sensorineural hearing loss at 4K-8K hertz.  Patient was counseled with regard to the findings.    RECOMMENDATIONS/PLAN:  Hearing aid evaluation  Follow-up with dermatology concerning sebaceous cyst below the left earlobe.  Discussed results and recommendations with patient. Questions were addressed and the patient was encouraged to contact our department should concerns arise.          Floyd Singh M.S, [facility]  Licensed Audiologist   No

## 2025-03-12 RX ORDER — CYCLOBENZAPRINE HCL 5 MG
5 TABLET ORAL 2 TIMES DAILY PRN
Qty: 90 TABLET | Refills: 1 | Status: SHIPPED | OUTPATIENT
Start: 2025-03-12

## 2025-03-13 ENCOUNTER — HOSPITAL ENCOUNTER (OUTPATIENT)
Dept: BONE DENSITY | Facility: HOSPITAL | Age: 65
Discharge: HOME | End: 2025-03-13
Admitting: NURSE PRACTITIONER
Payer: MEDICARE

## 2025-03-13 PROCEDURE — 77080 DXA BONE DENSITY AXIAL: CPT

## 2025-03-17 ENCOUNTER — OFFICE VISIT (OUTPATIENT)
Dept: ORTHOPEDIC SURGERY | Facility: CLINIC | Age: 65
End: 2025-03-17
Payer: MEDICARE

## 2025-03-17 VITALS
HEART RATE: 113 BPM | HEIGHT: 66 IN | WEIGHT: 130 LBS | SYSTOLIC BLOOD PRESSURE: 160 MMHG | OXYGEN SATURATION: 97 % | DIASTOLIC BLOOD PRESSURE: 105 MMHG | BODY MASS INDEX: 20.89 KG/M2

## 2025-03-17 DIAGNOSIS — M20.011 MALLET FINGER OF RIGHT HAND: Primary | ICD-10-CM

## 2025-03-17 PROCEDURE — 3077F SYST BP >= 140 MM HG: CPT | Performed by: PHYSICIAN ASSISTANT

## 2025-03-17 PROCEDURE — 99213 OFFICE O/P EST LOW 20 MIN: CPT | Performed by: PHYSICIAN ASSISTANT

## 2025-03-17 PROCEDURE — 3080F DIAST BP >= 90 MM HG: CPT | Performed by: PHYSICIAN ASSISTANT

## 2025-03-17 NOTE — PROGRESS NOTES
"Chief Complaint  Follow-up and Pain of the Right Hand    Subjective          Kavita Garcia presents to [facility]   History of Present Illness    Kavita Garcia presents today for a follow-up of her right hand.  Patient has a right third mallet finger treated conservatively.  Today, patient states that she is doing okay.  States that she has remained in her splint aside from hygiene.  She reports pain to her hand at times.  Denies numbness or tingling to the finger.  She does note some swelling to the finger on occasion.      Allergies   Allergen Reactions    Duloxetine Hcl Itching    Erythromycin Hives    Morphine Hives    Sulfa Antibiotics Hives    Melatonin Unknown - Low Severity        Social History     Socioeconomic History    Marital status:    Tobacco Use    Smoking status: Every Day     Current packs/day: 0.00     Average packs/day: 0.5 packs/day for 25.0 years (12.5 ttl pk-yrs)     Types: Cigarettes     Start date: 1985     Last attempt to quit: 2010     Years since quitting: 15.2    Smokeless tobacco: Never   Vaping Use    Vaping status: Never Used   Substance and Sexual Activity    Alcohol use: Never    Drug use: Not Currently     Comment: Marijuana ---- occasionally    Sexual activity: Not Currently        I reviewed the patient's chief complaint, history of present illness, review of systems, past medical history, surgical history, family history, social history, medications, and allergy list.     REVIEW OF SYSTEMS    Constitutional: Denies fevers, chills, weight loss  Cardiovascular: Denies chest pain, shortness of breath  Skin: Denies rashes, acute skin changes  Neurologic: Denies headache, loss of consciousness  MSK: Right hand pain      Objective   Vital Signs:   BP (!) 160/105 Comment: NO s/s HTN Pt stated in pain. provider aware. FU pcp  Pulse 113   Ht 166.4 cm (65.5\")   Wt 59 kg (130 lb)   SpO2 97%   BMI 21.30 kg/m²     Body mass index is 21.3 " kg/m².    Physical Exam    General: Alert. No acute distress.   Right upper extremity: Splint removed.  Mallet deformity to the right third finger with about 5 degrees of extension lag actively.  Full passive extension.  Slight discomfort to palpation of the third finger.  Sensation intact to median, radial, and ulnar nerve distributions.  Palpable radial pulse.    Procedures    Imaging Results (Most Recent)       None                   Assessment and Plan    Diagnoses and all orders for this visit:    1. Mallet finger of right hand (Primary)        Kavita Garcia presents today to follow-up with her right third mallet finger that we are treating conservatively.  Patient is instructed to continue with her mallet splint at all times, removing it for hygiene only.  When her splint is removed, she is instructed to keep her finger straight.  We discussed ice and elevation as needed for inflammation.  Patient advised to contact her PCP regarding her elevated blood pressure in the office today.      Patient will follow up in 4 weeks for reevaluation.        Call or return if symptoms worsen or patient has any concerns.       Follow Up   Return in about 4 weeks (around 4/14/2025).  Patient was given instructions and counseling regarding her condition or for health maintenance advice. Please see specific information pulled into the AVS if appropriate.     Eugenia Portillo PA-C  03/17/25  10:48 EDT

## 2025-03-18 ENCOUNTER — PATIENT OUTREACH (OUTPATIENT)
Dept: CASE MANAGEMENT | Facility: OTHER | Age: 65
End: 2025-03-18
Payer: MEDICARE

## 2025-03-18 DIAGNOSIS — M06.9 RHEUMATOID ARTHRITIS, INVOLVING UNSPECIFIED SITE, UNSPECIFIED WHETHER RHEUMATOID FACTOR PRESENT: Primary | ICD-10-CM

## 2025-03-18 DIAGNOSIS — I15.9 SECONDARY HYPERTENSION: ICD-10-CM

## 2025-03-18 DIAGNOSIS — H91.90 HEARING LOSS, UNSPECIFIED HEARING LOSS TYPE, UNSPECIFIED LATERALITY: ICD-10-CM

## 2025-03-18 NOTE — OUTREACH NOTE
"AMBULATORY CASE MANAGEMENT NOTE    Names and Relationships of Patient/Support Persons: Contact: Kavita Garcia; Relationship: Self -     CCM Interim Update    I spoke with the patient on the phone today. She has been attending all of her scheduled appointments. She is continuing to take her Buspar and states she is starting to feel better and \"take control of my own life\".    She went to the audiologist. He has suggested hearing aids for her. She is going to check to see what insurance will cover.    She has an appointment with dermatology on 4/8 for the cyst behind her ear.    She has been to ortho regarding her finger. She will continue with the brace for 4 more weeks then re-evaluate. She has also made an appointment for them to address her trigger finger.    She also completed her DEXA scan.    Patient has no other needs at this time. The plan is to continue going to her appointments and taking her medications as prescribed.    Saima VOGEL  Ambulatory Case Management    3/18/2025, 10:46 EDT  "

## 2025-03-21 ENCOUNTER — RESULTS FOLLOW-UP (OUTPATIENT)
Dept: INTERNAL MEDICINE | Facility: CLINIC | Age: 65
End: 2025-03-21
Payer: MEDICARE

## 2025-03-21 DIAGNOSIS — M81.8 OTHER OSTEOPOROSIS, UNSPECIFIED PATHOLOGICAL FRACTURE PRESENCE: Primary | ICD-10-CM

## 2025-03-24 ENCOUNTER — TELEPHONE (OUTPATIENT)
Dept: PSYCHIATRY | Facility: CLINIC | Age: 65
End: 2025-03-24

## 2025-03-24 DIAGNOSIS — F33.0 MILD EPISODE OF RECURRENT MAJOR DEPRESSIVE DISORDER: ICD-10-CM

## 2025-03-24 DIAGNOSIS — F41.1 GAD (GENERALIZED ANXIETY DISORDER): ICD-10-CM

## 2025-03-24 RX ORDER — DESVENLAFAXINE 100 MG/1
100 TABLET, EXTENDED RELEASE ORAL DAILY
Qty: 90 TABLET | Refills: 1 | Status: SHIPPED | OUTPATIENT
Start: 2025-03-24 | End: 2025-03-25 | Stop reason: SDUPTHER

## 2025-03-24 NOTE — TELEPHONE ENCOUNTER
"Attempted to contact patient. Left message to call the office back.     Relay   HUB ok to read/advise  \"Tsh elevated. Need to increase synthroid. Sending to the pharmacy.     GFR decreased. Increase water intake and we will recheck      The 10-year ASCVD risk score (Delfina GARCIA, et al., 2019) is: 13.2%  It is recommended that patient be on cholesterol lowering medication. Has patient been taking her Atorvastatin 40 mg?      DEXA scan -  Osteoporosis noted. Would she like to do every 6 months Prolia injection at the hospital or weekly Fosamax? \"        "

## 2025-03-24 NOTE — TELEPHONE ENCOUNTER
Called patient in regards to no showed appointment with Valarie Angeles. Patient was educated on no show policy as well as r/s.

## 2025-03-25 ENCOUNTER — PATIENT OUTREACH (OUTPATIENT)
Dept: CASE MANAGEMENT | Facility: OTHER | Age: 65
End: 2025-03-25
Payer: MEDICARE

## 2025-03-25 DIAGNOSIS — F33.0 MILD EPISODE OF RECURRENT MAJOR DEPRESSIVE DISORDER: ICD-10-CM

## 2025-03-25 DIAGNOSIS — F41.1 GAD (GENERALIZED ANXIETY DISORDER): Primary | ICD-10-CM

## 2025-03-25 DIAGNOSIS — F41.1 GAD (GENERALIZED ANXIETY DISORDER): ICD-10-CM

## 2025-03-25 RX ORDER — DESVENLAFAXINE 100 MG/1
100 TABLET, EXTENDED RELEASE ORAL DAILY
Qty: 90 TABLET | Refills: 1 | Status: SHIPPED | OUTPATIENT
Start: 2025-03-25

## 2025-03-25 NOTE — OUTREACH NOTE
AMBULATORY CASE MANAGEMENT NOTE    Names and Relationships of Patient/Support Persons: Contact: Kavita Garcia; Relationship: Self  Contact: Kavita Garcia; Relationship: Self -     [facility] Interim Update    Patient called and stated that her Pristiq was sent to [facility] and it needs to be sent to [facility]. I have sent the same prescription for Pristiq to [facility] as requested by patient.. I called patient to let her know this.    Saima VOGEL  Ambulatory Case Management    3/25/2025, 14:23 EDT

## 2025-03-25 NOTE — TELEPHONE ENCOUNTER
"2nd attempt to contact patient. Left message to call the office back.      Relay   HUB ok to read/advise  \"Tsh elevated. Need to increase synthroid. Sending to the pharmacy.     GFR decreased. Increase water intake and we will recheck      The 10-year ASCVD risk score (Delfina GARCIA, et al., 2019) is: 13.2%  It is recommended that patient be on cholesterol lowering medication. Has patient been taking her Atorvastatin 40 mg?        DEXA scan -  Osteoporosis noted. Would she like to do every 6 months Prolia injection at the hospital or weekly Fosamax? \"     "

## 2025-03-26 RX ORDER — ATORVASTATIN CALCIUM 80 MG/1
80 TABLET, FILM COATED ORAL DAILY
Qty: 90 TABLET | Refills: 1 | Status: SHIPPED | OUTPATIENT
Start: 2025-03-26

## 2025-03-26 RX ORDER — ALENDRONATE SODIUM 70 MG/1
70 TABLET ORAL
Qty: 12 TABLET | Refills: 1 | Status: SHIPPED | OUTPATIENT
Start: 2025-03-26

## 2025-03-26 NOTE — TELEPHONE ENCOUNTER
Spoke with patient. Verified .   Made aware of results.  Verbalized understanding.       Patient is already  taking Atorvastain 40 mg every night.   Patient is agreeable to begin Fosamax.

## 2025-03-27 ENCOUNTER — PATIENT OUTREACH (OUTPATIENT)
Dept: CASE MANAGEMENT | Facility: OTHER | Age: 65
End: 2025-03-27
Payer: MEDICARE

## 2025-03-27 DIAGNOSIS — M51.34 DEGENERATION OF INTERVERTEBRAL DISC OF THORACIC REGION: ICD-10-CM

## 2025-03-27 DIAGNOSIS — H93.13 TINNITUS OF BOTH EARS: ICD-10-CM

## 2025-03-27 DIAGNOSIS — M06.9 RHEUMATOID ARTHRITIS, INVOLVING UNSPECIFIED SITE, UNSPECIFIED WHETHER RHEUMATOID FACTOR PRESENT: Primary | ICD-10-CM

## 2025-03-27 DIAGNOSIS — F41.1 GAD (GENERALIZED ANXIETY DISORDER): ICD-10-CM

## 2025-03-27 DIAGNOSIS — I15.9 SECONDARY HYPERTENSION: ICD-10-CM

## 2025-03-27 NOTE — OUTREACH NOTE
CCM End of Month Documentation    This Chronic Medical Management Care Plan for Kavita Garcia, 64 y.o. female, has been monitored and managed; reviewed; revised and a new plan of care implemented for the month of March.  A cumulative time of 99  minutes was spent on this patient record this month, including phone call with patient; phone call with other providers; electronic communication with primary care provider; chart review, appts made for patient with [facility] specialists.    Regarding the patient's problems: has Rheumatoid arthritis; Chronic pain; Degeneration of intervertebral disc of lumbar region; Degeneration of intervertebral disc of thoracic region; Depression; Fibromyositis; Hyperlipidemia; Hypertension; Hypothyroidism; Other long term (current) drug therapy; Postlaminectomy syndrome, not elsewhere classified; Breakdown (mechanical) of int fix of vertebrae, init; Cervical spinal stenosis; Lumbar stenosis; Closed wedge compression fracture of T10 vertebra; Displacement of internal fixation device of vertebrae; Kyphosis of thoracolumbar region; Lumbar radiculopathy; Lumbar scoliosis; Neuropathy; Osteoporosis; Ankylosis of spine; Pseudarthrosis following spinal fusion; Pseudoarthrosis of spine; S/P lumbar fusion; Acquired postural kyphosis; Secondary kyphosis deformity of spine; Status post left hip replacement; Tobacco use; DDD (degenerative disc disease); Anxiety and depression; Nausea and vomiting, unspecified vomiting type; History of diverticulitis; Allergic rhinitis; JOSE A (generalized anxiety disorder); Cigarette nicotine dependence with nicotine-induced disorder; and Nausea on their problem list., the following items were addressed: medical records; medications; changes to medical care; referrals to community service providers and any changes can be found within the plan section of the note.  A detailed listing of time spent for chronic care management is tracked within each outreach encounter.   Current medications include:  has a current medication list which includes the following prescription(s): alendronate, atorvastatin, buspirone, cetirizine, cholecalciferol, cyclobenzaprine, desvenlafaxine, fluticasone, gabapentin, levothyroxine, naloxone, ondansetron odt, oxycodone-acetaminophen, and trazodone. and the patient is reported to be patient is compliant with medication protocol,  Medications are reported to be effective.  Regarding these diagnoses, referrals were made to the following provider(s):  Behavioral Health.  All notes on chart for PCP to review.    The patient was monitored remotely for blood pressure; activity level; pain; medications; mood & behavior.    The patient's physical needs include:  help taking medications as prescribed; physical healthcare; medication education, Rheumatology(appt 4/29), DEXA scan(appt 3/13), Colonoscopy(appt scheduled), Pain Management(sees Palliative Care through [facility]), BH talk therapy.     The patient's mental support needs include:  coordination of community providers; continued support    The patient's cognitive support needs include:  medication; coordination of community providers; emotional care; health care; household care; continued support; increased support    The patient's psychosocial support needs include:  coordination of community providers; medication management or adherence; continued support    The patient's functional needs include: medication education; physical healthcare; hearing care    The patient's environmental needs include:  not applicable    Care Plan overall comments:  Ongoing    Refer to previous outreach notes for more information on the areas listed above.    Monthly Billing Diagnoses  (M06.9) Rheumatoid arthritis, involving unspecified site, unspecified whether rheumatoid factor present    (I15.9) Secondary hypertension    (M51.34) Degeneration of intervertebral disc of thoracic region    (H93.13) Tinnitus of both  ears    (F41.1) JOSE A (generalized anxiety disorder)    Medications   Medications have been reconciled    Care Plan progress this month:      Recently Modified Care Plans Updates made since 2/24/2025 12:00 AM      No recently modified care plans.               Current Specialty Plan of Care Status signed by both patient and provider    Instructions   Patient was provided an electronic copy of care plan  CCM services were explained and offered and patient has accepted these services.  Patient has given their written consent to receive CCM services and understands that this includes the authorization of electronic communication of medical information with the other treating providers.  Patient understands that they may stop CCM services at any time and these changes will be effective at the end of the calendar month and will effectively revocate the agreement of CCM services.  Patient understands that only one practitioner can furnish and be paid for CCM services during one calendar month.  Patient also understands that there may be co-payment or deductible fees in association with CCM services.  Patient will continue with at least monthly follow-up calls with the Ambulatory .    Saima VOGEL  Ambulatory Case Management    3/27/2025, 11:21 EDT   PMHx of anxiety, asthma, BCC, BPH, CVA, chronic allergic rhinitis, coronary artery disease s/p 3 stents on June 30, 2017 done at [place] with Dr. Lavelle Reid, esophageal cancer, former smoker stopped 50 years ago, HTN, HLD, OA of the right knee, RA, hypothyroidism, and seizure disorder 1 episode approximately 15 yrs ago.

## 2025-04-07 ENCOUNTER — PATIENT OUTREACH (OUTPATIENT)
Dept: CASE MANAGEMENT | Facility: OTHER | Age: 65
End: 2025-04-07
Payer: MEDICARE

## 2025-04-07 DIAGNOSIS — F33.0 MILD EPISODE OF RECURRENT MAJOR DEPRESSIVE DISORDER: Primary | ICD-10-CM

## 2025-04-07 DIAGNOSIS — E03.9 ACQUIRED HYPOTHYROIDISM: ICD-10-CM

## 2025-04-07 DIAGNOSIS — F33.0 MILD EPISODE OF RECURRENT MAJOR DEPRESSIVE DISORDER: ICD-10-CM

## 2025-04-07 DIAGNOSIS — F41.1 GAD (GENERALIZED ANXIETY DISORDER): ICD-10-CM

## 2025-04-07 RX ORDER — DESVENLAFAXINE 100 MG/1
100 TABLET, EXTENDED RELEASE ORAL DAILY
Qty: 90 TABLET | Refills: 1 | Status: CANCELLED | OUTPATIENT
Start: 2025-04-07

## 2025-04-07 NOTE — TELEPHONE ENCOUNTER
Georgette~    The patient called today stating she needs a refill on her Pristiq.     She also stated that she thought you were going to increase her synthroid from the 150mcg. If so, will you send that into [facility]?    Lastly, she thinks she needs an increase in her Buspar. I told her to discuss that at her upcoming appointment with you. She is agreeable to this.    I also explained to her that Martha is now her ACM and gave her her number to call in the future.    Thank you,  Saima

## 2025-04-07 NOTE — OUTREACH NOTE
AMBULATORY CASE MANAGEMENT NOTE    Names and Relationships of Patient/Support Persons: Contact: Kavita Garcia; Relationship: Self -     CCM Interim Update    The patient called today stating she needs a refill on her Pristiq.      She also stated that she thought the PCP was going to increase her synthroid from the 150mcg.     Lastly, she thinks she needs an increase in her Buspar. I told her to discuss this at her upcoming appointment with PCP. She is agreeable to this.     I also explained to her that Martha is now her ACM and gave her her number to call in the future.       Care Coordination    I sent electronic communication to PCP and ACM regarding the above.    Saima VOGEL  Ambulatory Case Management    4/7/2025, 09:22 EDT

## 2025-04-08 NOTE — TELEPHONE ENCOUNTER
This prescription was already sent on March 25, 2025 for a 6 mos supply to [facility].  I have asked the patient to call her Rx service to check on the Rx and I will call her later on in the afternoon to see if she got it resolved.

## 2025-04-09 ENCOUNTER — PREP FOR SURGERY (OUTPATIENT)
Dept: OTHER | Facility: HOSPITAL | Age: 65
End: 2025-04-09
Payer: MEDICARE

## 2025-04-09 ENCOUNTER — CLINICAL SUPPORT (OUTPATIENT)
Dept: GASTROENTEROLOGY | Facility: CLINIC | Age: 65
End: 2025-04-09
Payer: MEDICARE

## 2025-04-09 DIAGNOSIS — Z80.0 FAMILY HX OF COLON CANCER: ICD-10-CM

## 2025-04-09 DIAGNOSIS — Z12.11 ENCOUNTER FOR SCREENING COLONOSCOPY: Primary | ICD-10-CM

## 2025-04-09 RX ORDER — SOD SULF/POT CHLORIDE/MAG SULF 1.479 G
12 TABLET ORAL TAKE AS DIRECTED
Qty: 24 TABLET | Refills: 0 | Status: SHIPPED | OUTPATIENT
Start: 2025-04-09

## 2025-04-09 NOTE — PROGRESS NOTES
Kavita Garcia  1960  64 y.o.    Reason for call: Screening Colonoscopy, Last scope 2014 dr shanell barbour,  hx colon cancer-mother, hx colon resection 2012  If recall, please list diagnosis:  Please note this diagnosis should be entered in the case request  Prep prescribed: Sutab  pt denies hx of seizure or kidney disease  Prep instructions reviewed with patient and sent to patient via miCabt  Is the patient currently on any injectable or oral medications for weight loss or diabetes? No  Clearance needed? No  If yes, what clearance is needed? N/A  Clearance has been requested from   The patient has been scheduled for: Colonoscopy     Kavita Garcia is aware they have been scheduled for a screening colonoscopy. Patient has expressed they are not having any symptoms at all.     Family history of colon cancer? Yes  If yes, indicate relative: Mother  Tentative Procedure Date: 4.21.25    Date/Place of last Scope: 2014 Dr Barbour  Able to obtain report? yes  Family History   Problem Relation Age of Onset    Colon cancer Mother     Arthritis Mother     Cancer Mother 58        Colon Cancer    Osteoporosis Mother     Hypertension Mother     Stroke Mother     Hyperlipidemia Mother     Thyroid disease Mother     Liver disease Father     Alcohol abuse Father     Thyroid disease Sister     Cancer Maternal Aunt         lung    Esophageal cancer Neg Hx     Liver cancer Neg Hx     Rectal cancer Neg Hx     Stomach cancer Neg Hx      Past Medical History:   Diagnosis Date    Abdominal pain 08/09/2015    Arthritis     Colon perforation 2012    Depression     GERD     Hypokalemia 02/05/2024    Hypothyroidism     Ileus, postoperative 08/13/2015    Neuropathy     Osteoporosis     Rheumatoid aortitis     Right ureteral stone 06/30/2024    Scoliosis 2010    Stomach ulcer 2012     Allergies   Allergen Reactions    Duloxetine Hcl Itching    Erythromycin Hives    Morphine Hives    Sulfa Antibiotics Hives    Melatonin Unknown - Low  Severity     Past Surgical History:   Procedure Laterality Date    BACK SURGERY  [id number]    6 surgeries    CHOLECYSTECTOMY      COLON SURGERY      Colon Resection    COLONOSCOPY      CYSTOSCOPY, URETEROSCOPY, RETROGRADE PYELOGRAM, STENT INSERTION Right 07/01/2024    Procedure: CYSTOSCOPY URETEROSCOPY RETROGRADE PYELOGRAM HOLMIUM LASER STENT INSERTION, right;  Surgeon: Ruma La MD;  Location: [facility] MAIN OR;  Service: Urology;  Laterality: Right;    HIP FRACTURE SURGERY Bilateral     LEG SURGERY Right      Social History     Socioeconomic History    Marital status:    Tobacco Use    Smoking status: Every Day     Current packs/day: 0.00     Average packs/day: 0.5 packs/day for 25.0 years (12.5 ttl pk-yrs)     Types: Cigarettes     Start date: 1985     Last attempt to quit: 2010     Years since quitting: 15.2     Passive exposure: Current    Smokeless tobacco: Never   Vaping Use    Vaping status: Never Used   Substance and Sexual Activity    Alcohol use: Never    Drug use: Not Currently     Comment: Marijuana ---- occasionally    Sexual activity: Not Currently       Current Outpatient Medications:     alendronate (Fosamax) 70 MG tablet, Take 1 tablet by mouth Every 7 (Seven) Days., Disp: 12 tablet, Rfl: 1    atorvastatin (Lipitor) 80 MG tablet, Take 1 tablet by mouth Daily., Disp: 90 tablet, Rfl: 1    busPIRone (BUSPAR) 7.5 MG tablet, Take 1 tablet by mouth 3 (Three) Times a Day., Disp: 90 tablet, Rfl: 1    cyclobenzaprine (FLEXERIL) 5 MG tablet, Take 1 tablet by mouth 2 (Two) Times a Day As Needed for Muscle Spasms., Disp: 90 tablet, Rfl: 1    desvenlafaxine (PRISTIQ) 100 MG 24 hr tablet, Take 1 tablet by mouth Daily., Disp: 90 tablet, Rfl: 1    levothyroxine (SYNTHROID, LEVOTHROID) 150 MCG tablet, Take 1 tablet by mouth Daily., Disp: 90 tablet, Rfl: 1    ondansetron ODT (ZOFRAN-ODT) 4 MG disintegrating tablet, Place 1 tablet on the tongue Every 8 (Eight) Hours As Needed for Nausea or Vomiting.,  Disp: 15 tablet, Rfl: 0    traZODone (DESYREL) 50 MG tablet, Take 1 tablet by mouth Every Night., Disp: 90 tablet, Rfl: 1    cetirizine (zyrTEC) 10 MG tablet, Take 1 tablet by mouth Daily. (Patient not taking: Reported on 4/9/2025), Disp: 90 tablet, Rfl: 1    Cholecalciferol 10 MCG (400 UNIT) tablet, Take 1 tablet by mouth Daily. (Patient not taking: Reported on 4/9/2025), Disp: , Rfl:     fluticasone (FLONASE) 50 MCG/ACT nasal spray, 2 sprays into the nostril(s) as directed by provider Daily. (Patient not taking: Reported on 4/9/2025), Disp: 16 g, Rfl: 3    gabapentin (NEURONTIN) 300 MG capsule, Take 1 capsule by mouth 4 (Four) Times a Day. Palliative care manages (Patient not taking: Reported on 4/9/2025), Disp: , Rfl:     naloxone (NARCAN) 4 MG/0.1ML nasal spray, Call 911. Don't prime. [place] in 1 nostril for overdose. Repeat in 2-3 minutes in other nostril if no or minimal breathing/responsiveness. (Patient not taking: Reported on 4/9/2025), Disp: 2 each, Rfl: 0    oxyCODONE-acetaminophen (PERCOCET)  MG per tablet, Take 1 tablet by mouth Every 4 (Four) Hours As Needed. for pain (Patient not taking: Reported on 4/9/2025), Disp: , Rfl:

## 2025-04-10 ENCOUNTER — TELEPHONE (OUTPATIENT)
Dept: GASTROENTEROLOGY | Facility: CLINIC | Age: 65
End: 2025-04-10
Payer: MEDICARE

## 2025-04-10 NOTE — TELEPHONE ENCOUNTER
ENDO RECONCILIATION  Verify source of procedure(s): Nurse/MA screening 04/2025  If other, please list source:   TIME OUT-CONFIRM CORRECT PROCEDURE: Colonoscopy  Cardiology: none  Pulmonology: none  Blood thinner:  none  GLP-1: none  Additional DX/indication for procedure: screening colonoscopy, fm hx colon cancer-mom, hx colon resection  Please include any other notes relevant to endo reconciliation:  N/A

## 2025-04-14 ENCOUNTER — TELEPHONE (OUTPATIENT)
Dept: CASE MANAGEMENT | Facility: OTHER | Age: 65
End: 2025-04-14
Payer: MEDICARE

## 2025-04-14 ENCOUNTER — PATIENT OUTREACH (OUTPATIENT)
Dept: CASE MANAGEMENT | Facility: OTHER | Age: 65
End: 2025-04-14
Payer: MEDICARE

## 2025-04-14 DIAGNOSIS — F33.0 MILD EPISODE OF RECURRENT MAJOR DEPRESSIVE DISORDER: Primary | ICD-10-CM

## 2025-04-14 NOTE — OUTREACH NOTE
AMBULATORY CASE MANAGEMENT NOTE    Names and Relationships of Patient/Support Persons: Contact: Kavita Garcia; Relationship: Self -     CCM Interim Update  Called patient to introduce myself, I am her new ACM.   She was aware she didn't need a dose change on her thyroid medication.   Patient rescheduled her Dermatology appointment that was on 4/8 because she went on a cruise.   Patient said she wasn't able to get Pristiq from [facility], her mail order pharmacy because they said her insurance wasn't going to pay for it. Asked if she got a letter stating this and if she did, she overlooked it. Will check with them and then PCP if that is the case.   Patient states she didn't think her Flexeril was helping much, she still has muscle spasms and requesting a dose increase. She doesn't think she's on a very strong dose, her friend is on something that was 800 mg, however explained that was a different medication, its not the same one she is one, so the doses are different. Will send message to PCP asking about this.   Asked how her BP has been and she said its fine at home, but if she goes anywhere it goes up. States its usually 120's/80's, advised the goal is <130/80. She is going to continue with her log and bring to her next appointment with her PCP on 5/2 that I reminded her about.     Care Coordination  Spoke with [facility] pharmacy and she said it was probably denied because [facility] had a claim on it to be filled on 3/24 for 3 months, so she wouldn't be able to refill it until May 31st. However she would re-submit the refill and see, and it was accepted, they are going to send that refill out to the patient.     Education Documentation  No documentation found.      Martha DUARTE  Ambulatory Case Management  4/14/2025, 16:35 EDT

## 2025-04-14 NOTE — TELEPHONE ENCOUNTER
Patient requesting an increase in her dose of Flexeril, states she feels she's still having muscle spasms with the 5mg twice a day that she takes.  Her next f/u is scheduled with you on 5/2.  Please advise    Martha CARDENAS, RN, [facility]  Ambulatory Case Management  4/14/2025, 16:52 EDT

## 2025-04-15 NOTE — TELEPHONE ENCOUNTER
Attempted to contact patient to see if she wanted to make an appointment, UTR but left message with direct # [phone].  Martha CARDENAS, RN, [facility]  Ambulatory Case Management  4/15/2025, 09:43 EDT   .

## 2025-04-16 ENCOUNTER — TELEPHONE (OUTPATIENT)
Dept: GASTROENTEROLOGY | Facility: CLINIC | Age: 65
End: 2025-04-16
Payer: MEDICARE

## 2025-04-16 ENCOUNTER — PATIENT OUTREACH (OUTPATIENT)
Dept: CASE MANAGEMENT | Facility: OTHER | Age: 65
End: 2025-04-16
Payer: MEDICARE

## 2025-04-16 DIAGNOSIS — F41.1 GAD (GENERALIZED ANXIETY DISORDER): Primary | ICD-10-CM

## 2025-04-16 NOTE — TELEPHONE ENCOUNTER
Received email from [facility] AMARIS Garcia [phone]- patient had a sick family member.      Mailed certified letter to wili purcell

## 2025-04-16 NOTE — TELEPHONE ENCOUNTER
I spoke with patient and she's currently out of town, visiting family who isn't doing well. She said she would wait until [facility] follow-up with you on 5/2 and discuss it then.

## 2025-04-16 NOTE — OUTREACH NOTE
AMBULATORY CASE MANAGEMENT NOTE    Names and Relationships of Patient/Support Persons:  -     CCM Interim Update  See telephone note dated 4/14- sent message to PCP to update her.   Updated her on her Pristiq as well.   Updated her on upcomming appointment with behavioral health and Colonoscopy procedure since she said she was out of town. She asked this ACM to cancel her BH one, however she's coming back Sunday, so will be able to make Colonoscopy.     Education Documentation  No documentation found.      Martha DUARTE  Ambulatory Case Management  4/16/2025, 14:27 EDT

## 2025-04-28 ENCOUNTER — TELEMEDICINE (OUTPATIENT)
Dept: PSYCHIATRY | Facility: CLINIC | Age: 65
End: 2025-04-28
Payer: MEDICARE

## 2025-04-28 DIAGNOSIS — F41.1 GAD (GENERALIZED ANXIETY DISORDER): Primary | ICD-10-CM

## 2025-04-28 DIAGNOSIS — F43.20 GRIEF REACTION: ICD-10-CM

## 2025-04-28 DIAGNOSIS — F32.1 CURRENT MODERATE EPISODE OF MAJOR DEPRESSIVE DISORDER, UNSPECIFIED WHETHER RECURRENT: ICD-10-CM

## 2025-04-28 PROCEDURE — 90791 PSYCH DIAGNOSTIC EVALUATION: CPT

## 2025-04-28 NOTE — PATIENT INSTRUCTIONS
Suicidal Feelings: How to Help Yourself  Suicide is when you end your own life. Suicidal ideation includes expressing thoughts about, or a preoccupation with, ending your own life. There are many things you can do to help yourself feel better when struggling with these feelings. Many services and people are available to support you and others who struggle with similar feelings.  If you ever feel like you may hurt yourself or others, or have thoughts about taking your own life, get help right away. To get help:  Go to your nearest emergency room.  Call 911.  Call the [facility] and human services helpline (211 in the [place]  Call or text a suicide hotline to speak with a trained counselor. The following suicide hotlines are available in the [place]:  The Suicide & Crisis Lifeline (free and confidential):  Call 1-306.994.6930 or 988.  Text 469897.  2-315-JAEJDBN (1-847.640.4163).  If you're a [place]:  Call 988 and press 1.  Text the Veterans Crisis Line at 896706.  1-671.730.1576. This is a hotline for Polish speakers.  1-302.127.4880. This is a hotline for TTY users.  6-048-3-U-ALIZA (1-917.417.3719). This is a hotline for lesbian, rodriguez, bisexual, transgender, or questioning youth.  For a list of hotlines in [place], visit suicide.org/hotlines/international/yifyiy-pyhbrjg-sniauqpe.html  Contact a crisis center or a local suicide prevention center. To find a crisis center or suicide prevention center:  Call your local hospital, clinic, community service organization, mental health center, social service provider, or health department. Ask for help with connecting to a crisis center.  For a list of crisis centers in the [place], visit: suicidepreventionlifeline.org  For a list of crisis centers in [place], visit: suicideprevention.ca  How to help yourself feel better    Promise yourself that you will not do anything bad or extreme when you have suicidal feelings. Remember the times you have felt  hopeful.  Many people have gotten through suicidal thoughts and feelings, and you can too.  If you have had these feelings before, remind yourself that you can get through them again.  Let family, friends, teachers, or counselors know how you are feeling. Do not separate yourself from those who care about you and want to help you.  Talk with someone every day, even if you do not feel like talking to anyone or being with other people.  Face-to-face conversation is best to help them understand your feelings.  Contact a mental health care provider and work with this person regularly.  Make a safety plan that you can follow during a crisis.  Include phone numbers of suicide prevention hotlines, mental health professionals, and trusted friends and family members you can call during an emergency.  Save these numbers on your phone.  If you are thinking of taking a lot of medicine, give your medicine to someone who can give it to you as prescribed.  If you are on antidepressants and are concerned you will overdose, tell your health care provider so that he or she can give you safer medicines.  Try to stick to your routines and follow a schedule every day. Make self-care a priority.  Make a list of realistic goals, and cross them off when you achieve them. Accomplishments can give you a sense of worth.  Wait until you are feeling better before doing things that you find difficult or unpleasant.  Do things that you have always enjoyed to take your mind off your feelings.  Try reading a book, or listening to or playing music.  Spending time outside, in nature, may help you feel better.  Follow these instructions at home:    Visit your primary health care provider every year for a physical and a mental health checkup.  Take over-the-counter and prescription medicines only as told by your health care provider.  Ask your health care provider about the possible side effects of any medicines you are taking.  Ask your health care  provider about whether suicidal ideation is a possible side effect of any of your medicines.  Learn about suicidal ideation and what increases the risk for the development of suicidal thoughts.  Eat a well-balanced diet, and eat regular meals.  Get plenty of rest.  Exercise if you are able. Just 30 minutes of exercise each day can help you feel better.  Keep your living space well lit.  Do not use alcohol or drugs. Remove these substances from your home.  General recommendations  Remove weapons, poisons, knives, and other deadly items from your home.  Work with a mental health care provider as needed.  When you are feeling well, write yourself a letter with tips and support that you can read when you are not feeling well.  Remember that life's difficulties can be sorted out with help. Conditions can be treated, and you can learn behaviors and ways of thinking that will help you.  Work with your health care provider or counselor to learn ways of coping with your thoughts and feelings.  Where to find more information  National Suicide Prevention Lifeline: www.suicidepreventionlifeline.org  Hopeline: www.hopeline.ticckle  American Foundation for Suicide Prevention: www.afsp.org  The [place] Project (for lesbian, rodriguez, bisexual, transgender, or questioning youth): www.thetrevorproject.org  National [place] of Mental Health: www.nimh.nih.gov/health/topics/suicide-prevention  Suicide Prevention Resources: afsp.org/suicide-prevention-resources  Contact a health care provider if:  You feel as though you are a burden to others.  You feel agitated, angry, vengeful, or have extreme mood swings.  You have withdrawn from family and friends.  You are frequently using drugs or alcohol.  Get help right away if:  You are talking about suicide or wishing to die.  You start making plans for how to commit suicide.  You feel that you have no reason to live.  You start making plans for putting your affairs in order, saying goodbye, or giving  your possessions away.  You feel guilt, shame, or unbearable pain, and it seems like there is no way out.  You are engaging in risky behaviors that could lead to death.  If you have any of these thoughts or symptoms, get help right away:  Go to your nearest emergency department or crisis center.  Call emergency services (911 in the [place]  Call or text a suicide crisis helpline.  Summary  Suicide is when you take your own life. Suicidal feelings are thoughts about ending your own life.  Promise yourself that you will not do anything bad or extreme when you have suicidal feelings.  Let family, friends, teachers, or counselors know how you are feeling.  Get help right away if you start making plans for how to commit suicide.  This information is not intended to replace advice given to you by your health care provider. Make sure you discuss any questions you have with your health care provider.  Document Revised: 10/08/2024 Document Reviewed: 04/28/2022  Elsevier Patient Education © 2024 [employer]
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
## 2025-04-30 ENCOUNTER — PATIENT OUTREACH (OUTPATIENT)
Dept: CASE MANAGEMENT | Facility: OTHER | Age: 65
End: 2025-04-30
Payer: MEDICARE

## 2025-04-30 DIAGNOSIS — I15.9 SECONDARY HYPERTENSION: ICD-10-CM

## 2025-04-30 DIAGNOSIS — F41.1 GAD (GENERALIZED ANXIETY DISORDER): Primary | ICD-10-CM

## 2025-04-30 NOTE — OUTREACH NOTE
CCM End of Month Documentation    This Chronic Medical Management Care Plan for Kavita Garcia, 64 y.o. female, has been monitored and managed; reviewed and a new plan of care implemented for the month of April.  A cumulative time of 40  minutes was spent on this patient record this month, including phone call with patient; phone call with other providers; electronic communication with primary care provider; chart review.    Regarding the patient's problems: has Rheumatoid arthritis; Chronic pain; Degeneration of intervertebral disc of lumbar region; Degeneration of intervertebral disc of thoracic region; Depression; Fibromyositis; Hyperlipidemia; Hypertension; Hypothyroidism; Other long term (current) drug therapy; Postlaminectomy syndrome, not elsewhere classified; Breakdown (mechanical) of int fix of vertebrae, init; Cervical spinal stenosis; Lumbar stenosis; Closed wedge compression fracture of T10 vertebra; Displacement of internal fixation device of vertebrae; Kyphosis of thoracolumbar region; Lumbar radiculopathy; Lumbar scoliosis; Neuropathy; Osteoporosis; Ankylosis of spine; Pseudarthrosis following spinal fusion; Pseudoarthrosis of spine; S/P lumbar fusion; Acquired postural kyphosis; Secondary kyphosis deformity of spine; Status post left hip replacement; Tobacco use; DDD (degenerative disc disease); Anxiety and depression; Nausea and vomiting, unspecified vomiting type; History of diverticulitis; Allergic rhinitis; JOSE A (generalized anxiety disorder); Cigarette nicotine dependence with nicotine-induced disorder; Nausea; Encounter for screening colonoscopy; and Family hx of colon cancer on their problem list., the following items were addressed: medical records; medications; referrals to community service providers and any changes can be found within the plan section of the note.  A detailed listing of time spent for chronic care management is tracked within each outreach encounter.  Current medications  include:  has a current medication list which includes the following prescription(s): alendronate, atorvastatin, buspirone, cetirizine, cholecalciferol, cyclobenzaprine, desvenlafaxine, fluticasone, gabapentin, levothyroxine, naloxone, ondansetron odt, oxycodone-acetaminophen, sutab, and trazodone. and the patient is reported to be patient is compliant with medication protocol,  Medications are reported to be non-effective in controlling symptoms and changes have been made to the medication protocol.  Regarding these diagnoses, referrals were made to the following provider(s):  Psychology.  All notes on chart for PCP to review.    The patient was monitored remotely for blood pressure; activity level; pain; medications; mood & behavior.    The patient's physical needs include:  help taking medications as prescribed; physical healthcare; medication education; physician referral.     The patient's mental support needs include:  coordination of community providers; continued support    The patient's cognitive support needs include:  medication; coordination of community providers; emotional care; health care; household care; continued support    The patient's psychosocial support needs include:  coordination of community providers; medication management or adherence; continued support    The patient's functional needs include: medication education; physical healthcare; hearing care    The patient's environmental needs include:  not applicable, n/a    Care Plan overall comments:  Ongoing    Refer to previous outreach notes for more information on the areas listed above.    Monthly Billing Diagnoses  (F41.1) JOSE A (generalized anxiety disorder)    (I15.9) Secondary hypertension    Medications   Medications have been reconciled    Care Plan progress this month:      Recently Modified Care Plans Updates made since 3/30/2025 12:00 AM      No recently modified care plans.               Instructions   Patient was provided an  electronic copy of care plan  CCM services were explained and offered and patient has accepted these services.  Patient has given their written consent to receive CCM services and understands that this includes the authorization of electronic communication of medical information with the other treating providers.  Patient understands that they may stop CCM services at any time and these changes will be effective at the end of the calendar month and will effectively revocate the agreement of CCM services.  Patient understands that only one practitioner can furnish and be paid for CCM services during one calendar month.  Patient also understands that there may be co-payment or deductible fees in association with CCM services.  Patient will continue with at least monthly follow-up calls with the Ambulatory .    Martha DUARTE  Ambulatory Case Management    4/30/2025, 14:40 EDT

## 2025-05-21 ENCOUNTER — PATIENT OUTREACH (OUTPATIENT)
Dept: CASE MANAGEMENT | Facility: OTHER | Age: 65
End: 2025-05-21
Payer: MEDICARE

## 2025-05-21 DIAGNOSIS — F41.1 GAD (GENERALIZED ANXIETY DISORDER): Primary | ICD-10-CM

## 2025-05-21 NOTE — OUTREACH NOTE
AMBULATORY CASE MANAGEMENT NOTE    Names and Relationships of Patient/Support Persons:  -     CCM Interim Update  CCM outreach made with patient, she has been trying to decide if she wanted to move back to [place] with her cousins so she wasn't alone so much and has decided that she is. Will send message to PCP to make her aware. Made patient aware if she needs refills, she will need an appointment, she hasn't seen PCP since February and she wanted her to follow-up around 5/28. Patient had an appointment scheduled for 5/2 but canceled it. Patient said she plans on moving this weekend and also plans to make an appointment with her cousins provider.   Discharging from [facility]  Education Documentation  No documentation found.      Martha DUARTE  Ambulatory Case Management  5/21/2025, 16:08 EDT

## 2025-05-22 DIAGNOSIS — F41.1 GAD (GENERALIZED ANXIETY DISORDER): Chronic | ICD-10-CM

## 2025-05-22 DIAGNOSIS — F33.0 MILD EPISODE OF RECURRENT MAJOR DEPRESSIVE DISORDER: ICD-10-CM

## 2025-05-22 DIAGNOSIS — G47.00 INSOMNIA, UNSPECIFIED TYPE: ICD-10-CM

## 2025-05-22 DIAGNOSIS — M81.8 OTHER OSTEOPOROSIS, UNSPECIFIED PATHOLOGICAL FRACTURE PRESENCE: ICD-10-CM

## 2025-05-22 DIAGNOSIS — M62.838 MUSCLE SPASM: ICD-10-CM

## 2025-05-22 DIAGNOSIS — E03.9 ACQUIRED HYPOTHYROIDISM: ICD-10-CM

## 2025-05-22 DIAGNOSIS — F41.1 GAD (GENERALIZED ANXIETY DISORDER): ICD-10-CM

## 2025-05-22 NOTE — PROGRESS NOTES
Attempted to contact patient to let her know, but was UTR, but left detailed message to let us know.

## 2025-05-26 RX ORDER — CYCLOBENZAPRINE HCL 5 MG
5 TABLET ORAL 2 TIMES DAILY PRN
Qty: 90 TABLET | Refills: 0 | Status: SHIPPED | OUTPATIENT
Start: 2025-05-26

## 2025-05-26 RX ORDER — DESVENLAFAXINE 100 MG/1
100 TABLET, EXTENDED RELEASE ORAL DAILY
Qty: 90 TABLET | Refills: 0 | Status: SHIPPED | OUTPATIENT
Start: 2025-05-26

## 2025-05-26 RX ORDER — LEVOTHYROXINE SODIUM 150 UG/1
150 TABLET ORAL DAILY
Qty: 90 TABLET | Refills: 0 | Status: SHIPPED | OUTPATIENT
Start: 2025-05-26

## 2025-05-26 RX ORDER — ALENDRONATE SODIUM 70 MG/1
70 TABLET ORAL
Qty: 12 TABLET | Refills: 0 | Status: SHIPPED | OUTPATIENT
Start: 2025-05-26

## 2025-05-26 RX ORDER — BUSPIRONE HYDROCHLORIDE 7.5 MG/1
7.5 TABLET ORAL 3 TIMES DAILY
Qty: 270 TABLET | Refills: 0 | Status: SHIPPED | OUTPATIENT
Start: 2025-05-26

## 2025-05-26 RX ORDER — ATORVASTATIN CALCIUM 80 MG/1
80 TABLET, FILM COATED ORAL DAILY
Qty: 90 TABLET | Refills: 0 | Status: SHIPPED | OUTPATIENT
Start: 2025-05-26

## 2025-05-26 RX ORDER — TRAZODONE HYDROCHLORIDE 50 MG/1
50 TABLET ORAL NIGHTLY
Qty: 90 TABLET | Refills: 0 | Status: SHIPPED | OUTPATIENT
Start: 2025-05-26

## 2025-05-29 ENCOUNTER — TELEPHONE (OUTPATIENT)
Dept: PSYCHIATRY | Facility: CLINIC | Age: 65
End: 2025-05-29

## 2025-06-11 ENCOUNTER — TELEPHONE (OUTPATIENT)
Dept: PSYCHIATRY | Facility: CLINIC | Age: 65
End: 2025-06-11

## 2025-07-09 ENCOUNTER — TELEPHONE (OUTPATIENT)
Dept: PSYCHIATRY | Facility: CLINIC | Age: 65
End: 2025-07-09

## (undated) DEVICE — CYSTO PACK: Brand: MEDLINE INDUSTRIES, INC.

## (undated) DEVICE — SYS IRR PUMP SGL ACTN VAC SYR 10CC

## (undated) DEVICE — GW ULTRATRACK HYBRID STR/TP .035IN 3X150CM EA/5

## (undated) DEVICE — BASIC SINGLE BASIN-LF: Brand: MEDLINE INDUSTRIES, INC.

## (undated) DEVICE — TOWEL,OR,DSP,ST,BLUE,STD,4/PK,20PK/CS: Brand: MEDLINE

## (undated) DEVICE — ENDOSCOPIC VALVE WITH ADAPTER.: Brand: SURSEAL® II

## (undated) DEVICE — Y-TYPE TUR/BLADDER IRRIGATION SET, REGULATING CLAMP

## (undated) DEVICE — URETERAL ACCESS SHEATH SET: Brand: NAVIGATOR HD

## (undated) DEVICE — CATH URETRL OPEN END W/CONNECT 5F 70CM

## (undated) DEVICE — CATH URETRL FLXITP POLLACK STD 5F 70CM

## (undated) DEVICE — NITINOL STONE RETRIEVAL BASKET: Brand: ESCAPE

## (undated) DEVICE — Device

## (undated) DEVICE — FIBR LASR HOLMIUM COMPAT 272MH DISP

## (undated) DEVICE — SOL IRR NACL 0.9PCT 3000ML

## (undated) DEVICE — GLOVE,SURG,SENSICARE SLT,LF,PF,7: Brand: MEDLINE

## (undated) DEVICE — SKIN PREP TRAY W/CHG: Brand: MEDLINE INDUSTRIES, INC.